# Patient Record
Sex: FEMALE | Race: WHITE | NOT HISPANIC OR LATINO | Employment: FULL TIME | URBAN - METROPOLITAN AREA
[De-identification: names, ages, dates, MRNs, and addresses within clinical notes are randomized per-mention and may not be internally consistent; named-entity substitution may affect disease eponyms.]

---

## 2017-01-19 ENCOUNTER — RECORDS - HEALTHEAST (OUTPATIENT)
Dept: BONE DENSITY | Facility: CLINIC | Age: 57
End: 2017-01-19

## 2017-01-19 ENCOUNTER — TRANSFERRED RECORDS (OUTPATIENT)
Dept: HEALTH INFORMATION MANAGEMENT | Facility: CLINIC | Age: 57
End: 2017-01-19

## 2017-01-19 ENCOUNTER — HOSPITAL ENCOUNTER (OUTPATIENT)
Dept: MAMMOGRAPHY | Facility: CLINIC | Age: 57
Discharge: HOME | End: 2017-01-19
Attending: FAMILY MEDICINE

## 2017-01-19 ENCOUNTER — RECORDS - HEALTHEAST (OUTPATIENT)
Dept: ADMINISTRATIVE | Facility: OTHER | Age: 57
End: 2017-01-19

## 2017-01-19 DIAGNOSIS — Z12.31 VISIT FOR SCREENING MAMMOGRAM: ICD-10-CM

## 2017-01-19 DIAGNOSIS — Z13.820 ENCOUNTER FOR SCREENING FOR OSTEOPOROSIS: ICD-10-CM

## 2017-01-27 ENCOUNTER — AMBULATORY - HEALTHEAST (OUTPATIENT)
Dept: FAMILY MEDICINE | Facility: CLINIC | Age: 57
End: 2017-01-27

## 2017-01-27 ENCOUNTER — COMMUNICATION - HEALTHEAST (OUTPATIENT)
Dept: FAMILY MEDICINE | Facility: CLINIC | Age: 57
End: 2017-01-27

## 2017-01-27 DIAGNOSIS — M85.80 OSTEOPENIA: ICD-10-CM

## 2017-06-26 ENCOUNTER — COMMUNICATION - HEALTHEAST (OUTPATIENT)
Dept: FAMILY MEDICINE | Facility: CLINIC | Age: 57
End: 2017-06-26

## 2017-06-26 ENCOUNTER — AMBULATORY - HEALTHEAST (OUTPATIENT)
Dept: FAMILY MEDICINE | Facility: CLINIC | Age: 57
End: 2017-06-26

## 2017-06-27 ENCOUNTER — AMBULATORY - HEALTHEAST (OUTPATIENT)
Dept: NURSING | Facility: CLINIC | Age: 57
End: 2017-06-27

## 2018-09-28 ENCOUNTER — COMMUNICATION - HEALTHEAST (OUTPATIENT)
Dept: SCHEDULING | Facility: CLINIC | Age: 58
End: 2018-09-28

## 2018-10-22 ENCOUNTER — OFFICE VISIT - HEALTHEAST (OUTPATIENT)
Dept: FAMILY MEDICINE | Facility: CLINIC | Age: 58
End: 2018-10-22

## 2018-10-22 DIAGNOSIS — D75.839 THROMBOCYTOSIS: ICD-10-CM

## 2018-10-22 DIAGNOSIS — E55.9 VITAMIN D DEFICIENCY: ICD-10-CM

## 2018-10-22 DIAGNOSIS — Z12.31 VISIT FOR SCREENING MAMMOGRAM: ICD-10-CM

## 2018-10-22 DIAGNOSIS — Z23 FLU VACCINE NEED: ICD-10-CM

## 2018-10-22 DIAGNOSIS — Z00.00 ROUTINE GENERAL MEDICAL EXAMINATION AT A HEALTH CARE FACILITY: ICD-10-CM

## 2018-10-22 LAB
ANION GAP SERPL CALCULATED.3IONS-SCNC: 11 MMOL/L (ref 5–18)
BUN SERPL-MCNC: 11 MG/DL (ref 8–22)
CALCIUM SERPL-MCNC: 10.4 MG/DL (ref 8.5–10.5)
CHLORIDE BLD-SCNC: 107 MMOL/L (ref 98–107)
CHOLEST SERPL-MCNC: 209 MG/DL
CO2 SERPL-SCNC: 26 MMOL/L (ref 22–31)
CREAT SERPL-MCNC: 0.71 MG/DL (ref 0.6–1.1)
ERYTHROCYTE [DISTWIDTH] IN BLOOD BY AUTOMATED COUNT: 12.6 % (ref 11–14.5)
FASTING STATUS PATIENT QL REPORTED: YES
GFR SERPL CREATININE-BSD FRML MDRD: >60 ML/MIN/1.73M2
GLUCOSE BLD-MCNC: 85 MG/DL (ref 70–125)
HCT VFR BLD AUTO: 42.5 % (ref 35–47)
HDLC SERPL-MCNC: 74 MG/DL
HGB BLD-MCNC: 14.7 G/DL (ref 12–16)
LDLC SERPL CALC-MCNC: 120 MG/DL
MCH RBC QN AUTO: 30.1 PG (ref 27–34)
MCHC RBC AUTO-ENTMCNC: 34.6 G/DL (ref 32–36)
MCV RBC AUTO: 87 FL (ref 80–100)
PLATELET # BLD AUTO: 462 THOU/UL (ref 140–440)
PMV BLD AUTO: 10 FL (ref 7–10)
POTASSIUM BLD-SCNC: 4.5 MMOL/L (ref 3.5–5)
RBC # BLD AUTO: 4.89 MILL/UL (ref 3.8–5.4)
SODIUM SERPL-SCNC: 144 MMOL/L (ref 136–145)
TRIGL SERPL-MCNC: 76 MG/DL
TSH SERPL DL<=0.005 MIU/L-ACNC: 2.66 UIU/ML (ref 0.3–5)
WBC: 9.5 THOU/UL (ref 4–11)

## 2018-10-22 ASSESSMENT — MIFFLIN-ST. JEOR: SCORE: 1111.79

## 2018-10-23 ENCOUNTER — TRANSFERRED RECORDS (OUTPATIENT)
Dept: HEALTH INFORMATION MANAGEMENT | Facility: CLINIC | Age: 58
End: 2018-10-23

## 2018-10-23 ENCOUNTER — HOSPITAL ENCOUNTER (OUTPATIENT)
Dept: MAMMOGRAPHY | Facility: CLINIC | Age: 58
Discharge: HOME | End: 2018-10-23
Attending: FAMILY MEDICINE

## 2018-10-23 DIAGNOSIS — Z12.31 VISIT FOR SCREENING MAMMOGRAM: ICD-10-CM

## 2018-10-23 LAB
25(OH)D3 SERPL-MCNC: 32.6 NG/ML (ref 30–80)
HPV SOURCE: NORMAL
HUMAN PAPILLOMA VIRUS 16 DNA: NEGATIVE
HUMAN PAPILLOMA VIRUS 18 DNA: NEGATIVE
HUMAN PAPILLOMA VIRUS FINAL DIAGNOSIS: NORMAL
HUMAN PAPILLOMA VIRUS OTHER HR: NEGATIVE
SPECIMEN DESCRIPTION: NORMAL

## 2018-10-26 LAB

## 2018-10-27 ENCOUNTER — COMMUNICATION - HEALTHEAST (OUTPATIENT)
Dept: FAMILY MEDICINE | Facility: CLINIC | Age: 58
End: 2018-10-27

## 2018-12-11 ENCOUNTER — OFFICE VISIT - HEALTHEAST (OUTPATIENT)
Dept: FAMILY MEDICINE | Facility: CLINIC | Age: 58
End: 2018-12-11

## 2018-12-11 ENCOUNTER — COMMUNICATION - HEALTHEAST (OUTPATIENT)
Dept: SCHEDULING | Facility: CLINIC | Age: 58
End: 2018-12-11

## 2018-12-11 DIAGNOSIS — R06.2 WHEEZING: ICD-10-CM

## 2018-12-11 ASSESSMENT — MIFFLIN-ST. JEOR: SCORE: 1099.32

## 2018-12-20 ENCOUNTER — OFFICE VISIT - HEALTHEAST (OUTPATIENT)
Dept: FAMILY MEDICINE | Facility: CLINIC | Age: 58
End: 2018-12-20

## 2018-12-20 DIAGNOSIS — J45.40 MODERATE PERSISTENT ASTHMA WITHOUT COMPLICATION: ICD-10-CM

## 2018-12-20 ASSESSMENT — MIFFLIN-ST. JEOR: SCORE: 1108.39

## 2019-01-18 ENCOUNTER — COMMUNICATION - HEALTHEAST (OUTPATIENT)
Dept: FAMILY MEDICINE | Facility: CLINIC | Age: 59
End: 2019-01-18

## 2019-01-18 DIAGNOSIS — Z23 NEED FOR SHINGLES VACCINE: ICD-10-CM

## 2019-01-22 ENCOUNTER — AMBULATORY - HEALTHEAST (OUTPATIENT)
Dept: NURSING | Facility: CLINIC | Age: 59
End: 2019-01-22

## 2019-01-22 DIAGNOSIS — Z23 NEED FOR SHINGLES VACCINE: ICD-10-CM

## 2019-04-02 ENCOUNTER — COMMUNICATION - HEALTHEAST (OUTPATIENT)
Dept: FAMILY MEDICINE | Facility: CLINIC | Age: 59
End: 2019-04-02

## 2019-04-02 DIAGNOSIS — R06.2 WHEEZING: ICD-10-CM

## 2019-12-06 ENCOUNTER — COMMUNICATION - HEALTHEAST (OUTPATIENT)
Dept: FAMILY MEDICINE | Facility: CLINIC | Age: 59
End: 2019-12-06

## 2019-12-06 DIAGNOSIS — R06.2 WHEEZING: ICD-10-CM

## 2020-02-06 ENCOUNTER — COMMUNICATION - HEALTHEAST (OUTPATIENT)
Dept: FAMILY MEDICINE | Facility: CLINIC | Age: 60
End: 2020-02-06

## 2020-03-30 ENCOUNTER — COMMUNICATION - HEALTHEAST (OUTPATIENT)
Dept: FAMILY MEDICINE | Facility: CLINIC | Age: 60
End: 2020-03-30

## 2020-03-30 DIAGNOSIS — R06.2 WHEEZING: ICD-10-CM

## 2020-12-15 ENCOUNTER — OFFICE VISIT (OUTPATIENT)
Dept: OBGYN | Facility: CLINIC | Age: 60
End: 2020-12-15
Payer: COMMERCIAL

## 2020-12-15 ENCOUNTER — HOSPITAL ENCOUNTER (OUTPATIENT)
Dept: MAMMOGRAPHY | Facility: CLINIC | Age: 60
Discharge: HOME | End: 2020-12-15
Attending: OBSTETRICS & GYNECOLOGY | Admitting: OBSTETRICS & GYNECOLOGY
Payer: COMMERCIAL

## 2020-12-15 VITALS
HEIGHT: 64 IN | SYSTOLIC BLOOD PRESSURE: 126 MMHG | DIASTOLIC BLOOD PRESSURE: 58 MMHG | BODY MASS INDEX: 21.34 KG/M2 | WEIGHT: 125 LBS

## 2020-12-15 DIAGNOSIS — Z01.419 ENCOUNTER FOR GYNECOLOGICAL EXAMINATION WITHOUT ABNORMAL FINDING: Primary | ICD-10-CM

## 2020-12-15 DIAGNOSIS — Z12.11 COLON CANCER SCREENING: ICD-10-CM

## 2020-12-15 DIAGNOSIS — Z12.31 VISIT FOR SCREENING MAMMOGRAM: ICD-10-CM

## 2020-12-15 DIAGNOSIS — E55.9 VITAMIN D DEFICIENCY: ICD-10-CM

## 2020-12-15 DIAGNOSIS — Z12.4 SCREENING FOR CERVICAL CANCER: ICD-10-CM

## 2020-12-15 PROCEDURE — 77067 SCR MAMMO BI INCL CAD: CPT

## 2020-12-15 PROCEDURE — G0145 SCR C/V CYTO,THINLAYER,RESCR: HCPCS | Performed by: OBSTETRICS & GYNECOLOGY

## 2020-12-15 PROCEDURE — 99386 PREV VISIT NEW AGE 40-64: CPT | Performed by: OBSTETRICS & GYNECOLOGY

## 2020-12-15 PROCEDURE — 36415 COLL VENOUS BLD VENIPUNCTURE: CPT | Performed by: OBSTETRICS & GYNECOLOGY

## 2020-12-15 PROCEDURE — 82306 VITAMIN D 25 HYDROXY: CPT | Performed by: OBSTETRICS & GYNECOLOGY

## 2020-12-15 PROCEDURE — 87624 HPV HI-RISK TYP POOLED RSLT: CPT | Performed by: OBSTETRICS & GYNECOLOGY

## 2020-12-15 RX ORDER — PHENOL 1.4 %
AEROSOL, SPRAY (ML) MUCOUS MEMBRANE DAILY
COMMUNITY

## 2020-12-15 RX ORDER — MULTIVIT WITH MINERALS/LUTEIN
250 TABLET ORAL DAILY
COMMUNITY
End: 2024-03-03

## 2020-12-15 RX ORDER — VITAMIN B COMPLEX
TABLET ORAL DAILY
COMMUNITY
End: 2021-11-15

## 2020-12-15 RX ORDER — FLUTICASONE PROPIONATE 110 UG/1
1 AEROSOL, METERED RESPIRATORY (INHALATION) 2 TIMES DAILY PRN
COMMUNITY

## 2020-12-15 RX ORDER — ALBUTEROL SULFATE 0.83 MG/ML
2.5 SOLUTION RESPIRATORY (INHALATION) EVERY 6 HOURS PRN
COMMUNITY
End: 2021-04-02

## 2020-12-15 ASSESSMENT — MIFFLIN-ST. JEOR: SCORE: 1122

## 2020-12-15 NOTE — NURSING NOTE
"Chief Complaint   Patient presents with     Physical       Initial /58 (BP Location: Left arm, Patient Position: Chair, Cuff Size: Adult Large)   Ht 1.626 m (5' 4\")   Wt 56.7 kg (125 lb)   LMP 2008   Breastfeeding No   BMI 21.46 kg/m   Estimated body mass index is 21.46 kg/m  as calculated from the following:    Height as of this encounter: 1.626 m (5' 4\").    Weight as of this encounter: 56.7 kg (125 lb).  BP completed using cuff size: regular    Questioned patient about current smoking habits.  Pt. has never smoked.          The following HM Due: pap smear  Rocio Hood CMA    "

## 2020-12-15 NOTE — PATIENT INSTRUCTIONS
"You can reach your [facility] Care Team any time of the day by calling [phone]. This number will put you in touch with the 24 hour nurse line if the clinic is closed.    To contact your OB/GYN Station Coordinator/Surgery Scheduler please call [phone]. This is a direct number for your care team between 8 a.m. and 4 p.m. Monday through Friday.    [facility] is open for your convenience:  Monday through Friday 8 a.m. to 6 p.m.  Closed weekends and all major holidays.  What lifestyle changes can I make to help improve my cholesterol levels?    Exercise regularly.  Exercise can raise HDL cholesterol levels and reduce levels of LDL cholesterol and triglycerides. If you haven't been exercising, try to work up to 30 minutes, 4 to 6 times a week.    Lose weight if you are overweight.  Being overweight can raise your cholesterol levels. Losing weight, even just 5 or 10 pounds, can lower your total cholesterol, LDL cholesterol and triglyceride levels.    Eat a heart-healthy diet.  Eat plenty of fresh fruits and vegetables. Fruits and vegetables are naturally low in fat. Not only do they add flavor and variety to your diet, but they are also the best source of fiber, vitamins and minerals for your body. Aim for 5 cups of fruits and vegetables every day, not counting potatoes, corn and rice. Potatoes, corn and rice count as carbohydrates.     Pick \"good\" fats over \"bad\" fats. Fat is part of a healthy diet, but you need to know the difference between \"bad\" fats and \"good\" fats. \"Bad\" fats, such as saturated and trans fats, are found in foods such as butter; coconut and palm oil; saturated or partially hydrogenated vegetable fats such as shortening and margarine; animal fats in meats; and fats in whole milk dairy products. Limit the amount of saturated fat in your diet, and avoid trans fat completely. Unsaturated fat is the \"good\" fat. Most fats in fish, vegetables, grains and tree nuts are unsaturated. Try to eat " "unsaturated fat in place of saturated fat. For example, you can use olive oil or canola oil in cooking instead of butter.     Use healthier cooking methods. Baking, broiling and roasting are the healthiest ways to prepare meat, poultry and other foods. Trim any outside fat or skin before cooking. Lean cuts can be pan-broiled or stir-fried. Use either a nonstick pan or nonstick cooking spray instead of adding fats such as butter or margarine. When eating out, ask how food is prepared. You can request that your food be baked, broiled or roasted, rather than fried.   Look for other sources of protein. Fish, dry beans, tree nuts, peas and lentils offer protein, nutrients and fiber without the cholesterol and saturated fats that meats have. Consider eating one \"meatless\" meal each week. Try substituting beans for meat in a favorite recipe, such as lasagna or chili. Snack on a handful of almonds or pecans. Soy is also an excellent source of protein. Good examples of soy include soy milk, edamame (green soy beans), tofu and soy protein shakes.     Get more fiber in your diet. Add good sources of fiber to your meals. Examples include fruits, vegetables, whole grains (such as oat bran, whole and rolled oats and barley), legumes (such as beans and peas) and nuts and seeds (such as ground flax seed). In addition to fiber, whole grains supply B-vitamins and important nutrients not found in foods made with white flour.     Eat more fish. Fish are an excellent source of omega-3 fatty acids. Wild-caught oily fish, such as salmon, tuna, mackerel and sardines, are the best sources of omega-3s, but all fish contain some amount of this beneficial fatty acid. Aim for 2 6-oz servings each week.     "

## 2020-12-15 NOTE — PROGRESS NOTES
HPI:  Crys Quarles is a 60 year old white female  ,vasectomy and menopause for contraception who presents for an annual exam and pap.  She is otherwise doing well without concerns.  The patient had a fasting lipid panel in 2018 with a total cholesterol of 209 and LDL of 120.  The patient has no other significant risk factors.  We did discuss diet and exercise with a recheck on this.  I reviewed also her vitamin D level from 2018.  The patient presently is on supplements.  She has been following with Dr. Bettina Salgado a family practitioner in [place].  Dr. Salgado has since retired.  We discussed colon cancer screening and referral was given  Self breast exam,  ACS screening mammogram recs, the use of 81 mg ASA to decrease the risk of heart disease, lipid screening, colon cancer screening recs and Dexa scan recs thoroughly reveiwed.      Past Medical History:   Diagnosis Date     Asthma      NO ACTIVE PROBLEMS      Past Surgical History:   Procedure Laterality Date     BREAST BIOPSY, CORE RT/LT      benign     LASER TX, CERVICAL  89    Laser TX Cervical JENNIFER 3 neg margins, no invasion     [facility]       [facility]       Family History   Problem Relation Age of Onset     Diabetes Sister      Heart Disease Sister 68        heart attack     Thyroid Disease Sister      Heart Disease Sister          due to heart issues     Heart Disease Sister      Cancer Mother         mets     Breast Cancer Mother      Cancer Father         mets     Cancer - colorectal Maternal Aunt      Social History     Socioeconomic History     Marital status:      Spouse name: Not on file     Number of children: Not on file     Years of education: Not on file     Highest education level: Not on file   Occupational History     Occupation:      Employer: [employer]   Social Needs     Financial resource strain: Not on file     Food insecurity     Worry: Not on file      Inability: Not on file     Transportation needs     Medical: Not on file     Non-medical: Not on file   Tobacco Use     Smoking status: Never Smoker     Smokeless tobacco: Never Used   Substance and Sexual Activity     Alcohol use: No     Drug use: No     Sexual activity: Not Currently     Partners: Male     Birth control/protection: Post-menopausal     Comment: vasectomy   Lifestyle     Physical activity     Days per week: Not on file     Minutes per session: Not on file     Stress: Not on file   Relationships     Social connections     Talks on phone: Not on file     Gets together: Not on file     Attends Anabaptism service: Not on file     Active member of club or organization: Not on file     Attends meetings of clubs or organizations: Not on file     Relationship status: Not on file     Intimate partner violence     Fear of current or ex partner: Not on file     Emotionally abused: Not on file     Physically abused: Not on file     Forced sexual activity: Not on file   Other Topics Concern     Parent/sibling w/ CABG, MI or angioplasty before 65F 55M? Not Asked   Social History Narrative     Not on file       Allergies:  Patient has no known allergies.    Current Outpatient Medications   Medication Sig Dispense Refill     calcium carbonate (OS-ROBERT) 600 MG tablet Take by mouth 2 times daily (with meals)       Multiple Vitamins-Minerals (MULTIVITAMIN ADULTS PO)        vitamin C (ASCORBIC ACID) 250 MG tablet Take 250 mg by mouth daily       Vitamin D3 (CHOLECALCIFEROL) 25 mcg (1000 units) tablet Take by mouth daily       albuterol (PROVENTIL) (2.5 MG/3ML) 0.083% neb solution Take 2.5 mg by nebulization every 6 hours as needed for shortness of breath / dyspnea or wheezing       fluticasone (FLOVENT HFA) 110 MCG/ACT inhaler Inhale 1 puff into the lungs 2 times daily       NO ACTIVE MEDICATIONS .         ROS: ROS: 10 point ROS neg other than the symptoms noted above in the HPI.    EXAM:  Vitals: /58 (BP  "Location: Left arm, Patient Position: Chair, Cuff Size: Adult Large)   Ht 1.626 m (5' 4\")   Wt 56.7 kg (125 lb)   LMP 03/06/2008   Breastfeeding No   BMI 21.46 kg/m    BMI= Body mass index is 21.46 kg/m .  Constitutional: healthy, alert and no distress  Head: Normocephalic. No masses, lesions, tenderness or abnormalities  Neck: Neck supple. No adenopathy. Thyroid symmetric, normal size,, Carotids without bruits.  ENT: NEGATIVE for ear, mouth and throat problems  Breast:  breasts symmetric, no dominant or suspicious mass, no skin or nipple changes, no axillary adenopathy, unchanged from previous exam or self exam in taught and encouraged  Cardiovascular: negative, PMI normal. No lifts, heaves, or thrills. RRR. No murmurs, clicks gallops or rub  Respiratory: negative, Percussion normal. Good diaphragmatic excursion. Lungs clear  Gastrointestinal: Abdomen soft, non-tender. BS normal. No masses, organomegaly  Genitourinary: Pelvic Exam:  External Genitalia:     Normal appearance for age, no discharge present, no tenderness present, no inflammatory lesions present, color normal  Vagina:     Normal vaginal vault without central or paravaginal defects, no discharge present, no inflammatory lesions present, no masses present  Bladder:     Nontender to palpation  Urethra:   Urethral Body:  Urethra palpation normal, urethra structural support normal   Urethral Meatus:  No erythema or lesions present  Cervix:     Appearance healthy, no lesions present, nontender to palpation, no bleeding present  Uterus:     Uterus: firm, normal sized and nontender, midplane in position.   Adnexa:     No adnexal tenderness present, no adnexal masses present  Perineum:     Perineum within normal limits, no evidence of trauma, no rashes or skin lesions present  Anus:     Anus within normal limits, no hemorrhoids present  Inguinal Lymph Nodes:     No lymphadenopathy present  Pubic Hair:     Normal pubic hair distribution for age  Genitalia " and Groin:     No rashes present, no lesions present, no areas of discoloration, no masses present    Musculoskeletalextremities normal- no gross deformities noted, gait normal and normal muscle tone  Integument: no suspicious lesions or rashes  Neurologic: Gait normal. Reflexes normal and symmetric. Sensation grossly WNL.  Psychiatric: mentation appears normal and affect normal/bright  Hematologic/Lymphatic/Immunologic: Normal cervical lymph nodes     ASSESSMENT:/PLAN:  (Z01.419) Encounter for gynecological examination without abnormal finding  (primary encounter diagnosis)  Comment: DEXA screening results and recommendations reviewed  Plan: DX Hip/Pelvis/Spine        Follow-up scan ordered    (Z12.4) Screening for cervical cancer  Comment: Past history and recommendations reviewed  Plan: Pap imaged thin layer screen with HPV -         recommended age 30 - 65, HPV High Risk Types         DNA Cervical        Done    (E55.9) Vitamin D deficiency  Comment: Patient is on supplementation for low vitamin D level  Plan: Vitamin D Deficiency        We will recheck a vitamin D level with appropriate therapy to follow    (Z12.11) Colon cancer screening  Comment: Recommendations reviewed  Plan: GASTROENTEROLOGY ADULT REF PROCEDURE ONLY        Ordered      Bebo Mariscal M.D.

## 2020-12-16 LAB — DEPRECATED CALCIDIOL+CALCIFEROL SERPL-MC: 49 UG/L (ref 20–75)

## 2020-12-17 NOTE — RESULT ENCOUNTER NOTE
Please notify pt of nl result.  Patient can stop her additional vitamin D supplementation and switch to a daily multivitamin such as a 1 a day or Centrum instead.  The multivitamin and adequate servings of dairy products daily should provide her with an adequate vitamin D amount  Bebo Mariscal M.D.

## 2020-12-21 LAB
COPATH REPORT: NORMAL
PAP: NORMAL

## 2020-12-22 LAB
FINAL DIAGNOSIS: NORMAL
HPV HR 12 DNA CVX QL NAA+PROBE: NEGATIVE
HPV16 DNA SPEC QL NAA+PROBE: NEGATIVE
HPV18 DNA SPEC QL NAA+PROBE: NEGATIVE
SPECIMEN DESCRIPTION: NORMAL
SPECIMEN SOURCE CVX/VAG CYTO: NORMAL

## 2021-01-28 ENCOUNTER — ANCILLARY PROCEDURE (OUTPATIENT)
Dept: BONE DENSITY | Facility: CLINIC | Age: 61
End: 2021-01-28
Attending: OBSTETRICS & GYNECOLOGY
Payer: COMMERCIAL

## 2021-01-28 DIAGNOSIS — Z01.419 ENCOUNTER FOR GYNECOLOGICAL EXAMINATION WITHOUT ABNORMAL FINDING: ICD-10-CM

## 2021-01-28 PROCEDURE — 77080 DXA BONE DENSITY AXIAL: CPT | Performed by: INTERNAL MEDICINE

## 2021-02-01 NOTE — RESULT ENCOUNTER NOTE
Can you please reviewed the bone density result with the patient showing bilateral hip osteoporosis.  I would like her to see Dr. Bangura or one of the endocrinologists to consider additional therapy for this  Thank you  Bebo Mariscal MD FACOG

## 2021-02-04 DIAGNOSIS — Z01.419 ENCOUNTER FOR GYNECOLOGICAL EXAMINATION WITHOUT ABNORMAL FINDING: Primary | ICD-10-CM

## 2021-02-05 NOTE — PROGRESS NOTES
Per result note on the dexa scan this pt was contacted and advised to follow up with endocrinology.  The referral is placed and she has contact information for them.    Alda Whyte RN

## 2021-02-05 NOTE — PROGRESS NOTES
2/5/2021  I left the patient a voicemail message today to return my call.  By her recent DEXA scan she has a diagnosis of osteoporosis.  I did want her to see endocrinology to determine the most appropriate therapy plan for this.  Could you please contact the patient and help her make a trial with endocrinology to evaluate this concern  Thank you  Bebo Mariscal MD FACOG

## 2021-02-11 ENCOUNTER — TELEPHONE (OUTPATIENT)
Dept: OBGYN | Facility: CLINIC | Age: 61
End: 2021-02-11

## 2021-02-18 ENCOUNTER — VIRTUAL VISIT (OUTPATIENT)
Dept: OBGYN | Facility: CLINIC | Age: 61
End: 2021-02-18
Payer: COMMERCIAL

## 2021-02-18 DIAGNOSIS — M81.0 OSTEOPOROSIS, UNSPECIFIED OSTEOPOROSIS TYPE, UNSPECIFIED PATHOLOGICAL FRACTURE PRESENCE: Primary | ICD-10-CM

## 2021-02-18 PROCEDURE — 99214 OFFICE O/P EST MOD 30 MIN: CPT | Mod: 95 | Performed by: OBSTETRICS & GYNECOLOGY

## 2021-02-18 NOTE — PROGRESS NOTES
Crys is a 60 year old who is being evaluated via a billable telephone visit.      What phone number would you like to be contacted at? [phone]  How would you like to obtain your AVS?     Assessment & Plan     Osteoporosis, unspecified osteoporosis type, unspecified pathological fracture presence  Patient to see endocrinology.  At this point she would prefer to see the endocrinology clinic of [place] .  She will make an appointment and asked them to forward a copy of their findings      Review of prior external note(s) from - Recent DEXA scan and physical exam  30 minutes spent on the date of the encounter doing chart review, history and phone review, documentation and further activities as noted above  Bebo Mariscal M.D.       CONSULTATION/REFERRAL to endocrinology as noted above    No follow-ups on file.    Bebo Mariscal MD  [facility]   Crys is a 60 year old white female  postmenopausal not on hormone replacement therapy who presents for the following health issues recent DEXA scan showing osteoporosis.  The patient states that she had been on prednisone for 2 years and previously had been noted to have osteopenia on an earlier DEXA scan.  I reviewed the results of the DEXA scan today showing a T score of -2.5 multiples of the median.  I reviewed her FRAX index score as well.  Risks, benefits, and alternative modes of therapy discussed at length. Pathophysiology of the disease process reviewed, all of the patients questions answered and informed consent obtained.  We discussed different treatment alternatives for her osteoporosis.  We discussed in light of the fact that she is not been treated in the past having her seen and evaluated by endocrinology.  We discussed that medications such as Forteo for a period of 2 years followed by an agent such as Prolia or Reclast may be helpful.  Ice discussed with her that this would further be addressed  when she sees endocrinology.  I gave her the names of several endocrinologists and she will call to make an appointment with them:                                   Phone call duration: 30  minutes

## 2021-02-18 NOTE — PATIENT INSTRUCTIONS
You can reach your [facility] Care Team any time of the day by calling [phone]. This number will put you in touch with the 24 hour nurse line if the clinic is closed.    To contact your OB/GYN Station Coordinator/Surgery Scheduler please call [phone]. This is a direct number for your care team between 8 a.m. and 4 p.m. Monday through Friday.    [facility] is open for your convenience:  Monday through Friday 8 a.m. to 6 p.m.  Closed weekends and all major holidays.

## 2021-02-18 NOTE — NURSING NOTE
"Chief Complaint   Patient presents with     Consult     DEXA       Initial LMP 2008  Estimated body mass index is 21.46 kg/m  as calculated from the following:    Height as of 12/15/20: 1.626 m (5' 4\").    Weight as of 12/15/20: 56.7 kg (125 lb).  BP completed using cuff size: NA (Not Taken)    Questioned patient about current smoking habits.  Pt. has never smoked.          The following HM Due: NONE      The following patient reported/Care Every where data was sent to:  P ABSTRACT QUALITY INITIATIVES [[id number]]        Maria Luz Williamson CMA                 "

## 2021-02-26 ENCOUNTER — TELEPHONE (OUTPATIENT)
Dept: OBGYN | Facility: CLINIC | Age: 61
End: 2021-02-26

## 2021-02-26 NOTE — TELEPHONE ENCOUNTER
Pt calling to let md know that she called to schedule the colonoscopy that was ordered, and was advised that she is not due to have one until 8/2022.     Yesenia KONG RN

## 2021-02-26 NOTE — TELEPHONE ENCOUNTER
Bebo Mariscal MD  You 2 minutes ago (9:01 AM)     Yesenia-thank you for the update.  Epic had said that she was due at this time.  Thank you for the follow-up   Bebo Mariscal MD FACOG    Message text

## 2021-04-02 ENCOUNTER — VIRTUAL VISIT (OUTPATIENT)
Dept: ENDOCRINOLOGY | Facility: CLINIC | Age: 61
End: 2021-04-02
Payer: COMMERCIAL

## 2021-04-02 DIAGNOSIS — M81.0 AGE-RELATED OSTEOPOROSIS WITHOUT CURRENT PATHOLOGICAL FRACTURE: Primary | ICD-10-CM

## 2021-04-02 PROCEDURE — 99204 OFFICE O/P NEW MOD 45 MIN: CPT | Mod: 95 | Performed by: INTERNAL MEDICINE

## 2021-04-02 RX ORDER — ALENDRONATE SODIUM 70 MG/1
70 TABLET ORAL
Qty: 12 TABLET | Refills: 3 | Status: SHIPPED | OUTPATIENT
Start: 2021-04-02 | End: 2021-11-15 | Stop reason: ALTCHOICE

## 2021-04-02 NOTE — Clinical Note
Thanks for consult.  This is a straight-forward case and I recommend simple treatment with an oral bisphosphonate medication. She also needs follow-up for her ITP, asthma, hyperlipidemia... with you or another PCP.  TL

## 2021-04-02 NOTE — LETTER
4/2/2021         RE: Crys Quarles  [address] [facility] [id number]        Dear Colleague,    Thank you for referring your patient, Crys Quarles, to the [facility]. Please see a copy of my visit note below.    Crys is a 60 year old who is being evaluated via a billable video visit.      How would you like to obtain your AVS? Verbally Reviewed  If the video visit is dropped, the invitation should be resent by: Cellphone  Will anyone else be joining your video visit? No      Video Start Time: 2:00 pm      Name: Crys Quarles is a 60 year old woman, seen at the request of Dr. Bebo Mariscal for evaluation of     Chief Complaint   Patient presents with     Osteoporosis       HPI:  Recent issues:  Here for evaluation of osteoporosis  Reviewed medical history from patient and Epic chart record        1982. History of ITP, then Prednisone medication for ~2 yrs duration  Menopause early 50's, no subsequent HRT  Previous medical evaluations with Dr. Bettina Salgado/[facility]    1/23/17 DEXA:   L1-4    T-score:  -0.4    Left fem neck   T-score: -2.0   Right fem neck  T-score: 12.2    12/2020. GYN evaluation with Dr. Bebo Mariscal/[facility]   1/28/21 DEXA:               Lumbar Spine (L1-L4)       T-score:  -0.4, degenerative changes present               Left Femoral Neck             T-score:  -2.5               Right Femoral Neck           T-score:  -2.5    Health history includes:  Bone fractures:   none  Vit D deficiency:   yes   Kidney stones:   None  Osteoporosis med:   None previously  Supplements:    MVI 1-tab daily       Calcium 600 mg 1-tab daily       Vit C 500 mg 1-tab daily  Previous [facility] labs include:     Lab Test 12/15/20  1534   VITDT 49     Recent [facility] labs include:  Lab Results   Component Value Date    GLC 84 12/09/2013    VITDT 49 12/15/2020     Other chronic illnesses include:   ITP:    Followed by  PCP   Asthma:  Followed by PCP   Hyperlipidemia: Followed by PCP      Lives in [place], [place]  Sees Dr. Bebo Mariscal for medicine evaluations.    PMH/PSH:  Past Medical History:   Diagnosis Date     Age-related osteoporosis without current pathological fracture      Asthma      Hyperlipidemia LDL goal <130      Idiopathic thrombocytopenic purpura (H)      Past Surgical History:   Procedure Laterality Date     BREAST BIOPSY, CORE RT/LT      benign     LASER TX, CERVICAL  91    Laser TX Cervical JENNIFER 3 neg margins, no invasion     [facility]         Family Hx:  Family History   Problem Relation Age of Onset     Diabetes Sister      Heart Disease Sister 68        heart attack     Thyroid Disease Sister      Heart Disease Sister          due to heart issues     Heart Disease Sister      Cancer Mother         mets     Breast Cancer Mother      Cancer Father         mets     Cancer - colorectal Maternal Aunt          Social Hx:  Social History     Socioeconomic History     Marital status:      Spouse name: Not on file     Number of children: Not on file     Years of education: Not on file     Highest education level: Not on file   Occupational History     Occupation:      Employer: [employer]   Social Needs     Financial resource strain: Not on file     Food insecurity     Worry: Not on file     Inability: Not on file     Transportation needs     Medical: Not on file     Non-medical: Not on file   Tobacco Use     Smoking status: Never Smoker     Smokeless tobacco: Never Used   Substance and Sexual Activity     Alcohol use: No     Drug use: No     Sexual activity: Not Currently     Partners: Male     Birth control/protection: Post-menopausal     Comment: vasectomy   Lifestyle     Physical activity     Days per week: Not on file     Minutes per session: Not on file     Stress: Not on file   Relationships     Social connections     Talks on phone: Not  on file     Gets together: Not on file     Attends Sikhism service: Not on file     Active member of club or organization: Not on file     Attends meetings of clubs or organizations: Not on file     Relationship status: Not on file     Intimate partner violence     Fear of current or ex partner: Not on file     Emotionally abused: Not on file     Physically abused: Not on file     Forced sexual activity: Not on file   Other Topics Concern     Parent/sibling w/ CABG, MI or angioplasty before 65F 55M? Not Asked   Social History Narrative     Not on file          MEDICATIONS:  has a current medication list which includes the following prescription(s): alendronate, calcium carbonate, fluticasone, multiple vitamins-minerals, vitamin c, NO ACTIVE MEDICATIONS, and vitamin d3.    ROS:     ROS: 10 point ROS neg other than the symptoms noted above in the HPI.    GENERAL: mild fatigue, wt stable; denies fevers, chills, night sweats.    HEENT: no dysphagia, odonophagia, diplopia, neck pain  THYROID:  no apparent hyper or hypothyroid symptoms  CV: no chest pain, pressure, palpitations  LUNGS: no SOB, HOLLIDAY, cough, wheezing   ABDOMEN: no diarrhea, constipation, abdominal pain  EXTREMITIES: no rashes, ulcers, edema  NEUROLOGY: no headaches, denies changes in vision, tingling, extremitiy numbness   MSK: occasional back pains; denies muscle weakness  SKIN: no rashes or lesions  : no menses since early 50's  PSYCH:  stable mood, no significant anxiety or depression  ENDOCRINE: no heat or cold intolerance    Physical Exam (visual exam)  VS:  no vital signs taken for video visit  CONSTITUTIONAL: healthy, alert and NAD, well dressed, answering questions appropriately  ENT: no nose swelling or nasal discharge, mouth redness or gum changes.  EYES: eyes grossly normal to inspection, conjunctivae and sclerae normal, no exophthalmos or proptosis  THYROID:  no apparent nodules or goiter  LUNGS: no audible wheeze, cough or visible cyanosis,  no visible retractions or increased work of breathing  ABDOMEN: abdomen not evaluated  EXTREMITIES: no hand tremors, limited exam  NEUROLOGY: CN grossly intact, mentation intact and speech normal   SKIN:  no apparent skin lesions, rash, or edema with visualized skin appearance  PSYCH: mentation appears normal, affect normal/bright, judgement and insight intact,   normal speech and appearance well groomed      LABS:    All pertinent notes, labs, and images personally reviewed by me.     A/P:  Encounter Diagnosis   Name Primary?     Age-related osteoporosis without current pathological fracture Yes       Comments:  Reviewed health history and osteoporosis issues.  Risk factors include low estrogen of menopause, previous (short duration) steroid use for ITP, vit D deficiency  Reviewed and interpreted tests that I previously ordered.   Ordered appropriate tests for the endocrinology disease management.    Management options discussed and implemented after shared medical decision making with the patient.    Plan:  Discussed general issues with the osteoporosis diagnosis and management  Reviewed concept of using the DEXA scan imaging to assess bone mineral density (BMD)  Discussed terminology of the T-score   We discussed lab tests to assess for secondary causes of bone thinning  Reviewed treatment options with oral bisphosphonate, SERM, IV Boniva vs Reclast, SQ Prolia vs Forteo    Recommend:  Reviewed several different medication treatment options.  I favor a simple plan using an oral bisphoshponate medication  Would not use Prolia, Reclast, Forteo, or Tymlos at this time  Consider alendronate, risedronate, or ibandronate... this can be initiated by her GYN or another PCP    Start alendronate 70 mg po Q week, reviewed the medication, expected benefits and also possible SE's, dosing, expected duration of use   New alendronate Rx sent to her pharmacy  Monitor for symptom changes  Plan lab testing for calcium, vit D, TSH,  PTH with next primary care clinic lab testing   Orders placed  Continue calcium and vitamin D supplement use  Avoid heavy lifting and fall injuries  Repeat DEXA scan in 4/2022    She needs repeat fasting labs with her PCP to focus on the ITP, hyperlipidemia history  Since she plans to see Dr. Mariscal for general medicine visits, I encouraged her to make the followup appt with him  Addressed patient questions today    Future labs ordered today:   Orders Placed This Encounter   Procedures     Calcium     Parathyroid Hormone Intact     TSH     Vitamin D Deficiency     Radiology/Consults ordered today: None    Total time spent in with the patient evaluation:  35 min  Additional time spent reviewing pertinent lab tests and chart notes, and documentation:  10 min    Follow-up:  4/2022    BLANCA Jessica MD, MS  Endocrinology  [facility]    CC: JOSÉ Mariscal      Video-Visit Details    Type of service:  Video Visit    Video End Time: 2:35 pm    Originating Location (pt. Location): home    Distant Location (provider location):  [facility]/home     Platform used for Video Visit:  Ch[employer]      Again, thank you for allowing me to participate in the care of your patient.        Sincerely,        Bebo Jessica MD

## 2021-04-02 NOTE — PROGRESS NOTES
Crys is a 60 year old who is being evaluated via a billable video visit.      How would you like to obtain your AVS? Verbally Reviewed  If the video visit is dropped, the invitation should be resent by: Cellphone  Will anyone else be joining your video visit? No      Video Start Time: 2:00 pm      Name: Crys Quarles is a 60 year old woman, seen at the request of Dr. Bebo Mariscal for evaluation of     Chief Complaint   Patient presents with     Osteoporosis       HPI:  Recent issues:  Here for evaluation of osteoporosis  Reviewed medical history from patient and Epic chart record        1982. History of ITP, then Prednisone medication for ~2 yrs duration  Menopause early 50's, no subsequent HRT  Previous medical evaluations with Dr. Bettina Salgado/[facility]    1/23/17 DEXA:   L1-4    T-score:  -0.4    Left fem neck   T-score: -2.0   Right fem neck  T-score: 12.2    12/2020. GYN evaluation with Dr. Bebo Mariscal/[facility]   1/28/21 DEXA:               Lumbar Spine (L1-L4)       T-score:  -0.4, degenerative changes present               Left Femoral Neck             T-score:  -2.5               Right Femoral Neck           T-score:  -2.5    Health history includes:  Bone fractures:   none  Vit D deficiency:   yes   Kidney stones:   None  Osteoporosis med:   None previously  Supplements:    MVI 1-tab daily       Calcium 600 mg 1-tab daily       Vit C 500 mg 1-tab daily  Previous [facility] labs include:     Lab Test 12/15/20  1534   VITDT 49     Recent [facility] labs include:  Lab Results   Component Value Date    GLC 84 12/09/2013    VITDT 49 12/15/2020     Other chronic illnesses include:   ITP:    Followed by PCP   Asthma:  Followed by PCP   Hyperlipidemia: Followed by PCP      Lives in [place], [place]  Sees Dr. Bebo Mariscal for medicine evaluations.    PMH/PSH:  Past Medical History:   Diagnosis Date     Age-related osteoporosis without current pathological fracture      Asthma       Hyperlipidemia LDL goal <130      Idiopathic thrombocytopenic purpura (H)      Past Surgical History:   Procedure Laterality Date     BREAST BIOPSY, CORE RT/LT      benign     LASER TX, CERVICAL  91    Laser TX Cervical JENNIFER 3 neg margins, no invasion     SPLENECTOMY       ILDEFONSO [facility]         Family Hx:  Family History   Problem Relation Age of Onset     Diabetes Sister      Heart Disease Sister 68        heart attack     Thyroid Disease Sister      Heart Disease Sister          due to heart issues     Heart Disease Sister      Cancer Mother         mets     Breast Cancer Mother      Cancer Father         mets     Cancer - colorectal Maternal Aunt          Social Hx:  Social History     Socioeconomic History     Marital status:      Spouse name: Not on file     Number of children: Not on file     Years of education: Not on file     Highest education level: Not on file   Occupational History     Occupation:      Employer: [employer]   Social Needs     Financial resource strain: Not on file     Food insecurity     Worry: Not on file     Inability: Not on file     Transportation needs     Medical: Not on file     Non-medical: Not on file   Tobacco Use     Smoking status: Never Smoker     Smokeless tobacco: Never Used   Substance and Sexual Activity     Alcohol use: No     Drug use: No     Sexual activity: Not Currently     Partners: Male     Birth control/protection: Post-menopausal     Comment: vasectomy   Lifestyle     Physical activity     Days per week: Not on file     Minutes per session: Not on file     Stress: Not on file   Relationships     Social connections     Talks on phone: Not on file     Gets together: Not on file     Attends Jainism service: Not on file     Active member of club or organization: Not on file     Attends meetings of clubs or organizations: Not on file     Relationship status: Not on file     Intimate partner violence     Fear of current  or ex partner: Not on file     Emotionally abused: Not on file     Physically abused: Not on file     Forced sexual activity: Not on file   Other Topics Concern     Parent/sibling w/ CABG, MI or angioplasty before 65F 55M? Not Asked   Social History Narrative     Not on file          MEDICATIONS:  has a current medication list which includes the following prescription(s): alendronate, calcium carbonate, fluticasone, multiple vitamins-minerals, vitamin c, NO ACTIVE MEDICATIONS, and vitamin d3.    ROS:     ROS: 10 point ROS neg other than the symptoms noted above in the HPI.    GENERAL: mild fatigue, wt stable; denies fevers, chills, night sweats.    HEENT: no dysphagia, odonophagia, diplopia, neck pain  THYROID:  no apparent hyper or hypothyroid symptoms  CV: no chest pain, pressure, palpitations  LUNGS: no SOB, HOLLIDAY, cough, wheezing   ABDOMEN: no diarrhea, constipation, abdominal pain  EXTREMITIES: no rashes, ulcers, edema  NEUROLOGY: no headaches, denies changes in vision, tingling, extremitiy numbness   MSK: occasional back pains; denies muscle weakness  SKIN: no rashes or lesions  : no menses since early 50's  PSYCH:  stable mood, no significant anxiety or depression  ENDOCRINE: no heat or cold intolerance    Physical Exam (visual exam)  VS:  no vital signs taken for video visit  CONSTITUTIONAL: healthy, alert and NAD, well dressed, answering questions appropriately  ENT: no nose swelling or nasal discharge, mouth redness or gum changes.  EYES: eyes grossly normal to inspection, conjunctivae and sclerae normal, no exophthalmos or proptosis  THYROID:  no apparent nodules or goiter  LUNGS: no audible wheeze, cough or visible cyanosis, no visible retractions or increased work of breathing  ABDOMEN: abdomen not evaluated  EXTREMITIES: no hand tremors, limited exam  NEUROLOGY: CN grossly intact, mentation intact and speech normal   SKIN:  no apparent skin lesions, rash, or edema with visualized skin  appearance  PSYCH: mentation appears normal, affect normal/bright, judgement and insight intact,   normal speech and appearance well groomed      LABS:    All pertinent notes, labs, and images personally reviewed by me.     A/P:  Encounter Diagnosis   Name Primary?     Age-related osteoporosis without current pathological fracture Yes       Comments:  Reviewed health history and osteoporosis issues.  Risk factors include low estrogen of menopause, previous (short duration) steroid use for ITP, vit D deficiency  Reviewed and interpreted tests that I previously ordered.   Ordered appropriate tests for the endocrinology disease management.    Management options discussed and implemented after shared medical decision making with the patient.    Plan:  Discussed general issues with the osteoporosis diagnosis and management  Reviewed concept of using the DEXA scan imaging to assess bone mineral density (BMD)  Discussed terminology of the T-score   We discussed lab tests to assess for secondary causes of bone thinning  Reviewed treatment options with oral bisphosphonate, SERM, IV Boniva vs Reclast, SQ Prolia vs Forteo    Recommend:  Reviewed several different medication treatment options.  I favor a simple plan using an oral bisphoshponate medication  Would not use Prolia, Reclast, Forteo, or Tymlos at this time  Consider alendronate, risedronate, or ibandronate... this can be initiated by her GYN or another PCP    Start alendronate 70 mg po Q week, reviewed the medication, expected benefits and also possible SE's, dosing, expected duration of use   New alendronate Rx sent to her pharmacy  Monitor for symptom changes  Plan lab testing for calcium, vit D, TSH, PTH with next primary care clinic lab testing   Orders placed  Continue calcium and vitamin D supplement use  Avoid heavy lifting and fall injuries  Repeat DEXA scan in 4/2022    She needs repeat fasting labs with her PCP to focus on the ITP, hyperlipidemia  history  Since she plans to see Dr. Mariscal for general medicine visits, I encouraged her to make the followup appt with him  Addressed patient questions today    Future labs ordered today:   Orders Placed This Encounter   Procedures     Calcium     Parathyroid Hormone Intact     TSH     Vitamin D Deficiency     Radiology/Consults ordered today: None    Total time spent in with the patient evaluation:  35 min  Additional time spent reviewing pertinent lab tests and chart notes, and documentation:  10 min    Follow-up:  4/2022    BLANCA Jessica MD, MS  Endocrinology  [facility]    CC: JOSÉ Mariscal      Video-Visit Details    Type of service:  Video Visit    Video End Time: 2:35 pm    Originating Location (pt. Location): home    Distant Location (provider location):  [facility]/home     Platform used for Video Visit:  Ottoniel

## 2021-05-27 NOTE — TELEPHONE ENCOUNTER
RN cannot approve Refill Request    RN can NOT refill this medication med is not covered by policy/route to provider     . Last office visit: 12/11/2018 Lila Meehan MD Last Physical: Visit date not found Last MTM visit: Visit date not found Last visit same specialty: 12/20/2018 Bettina Salgado MD.  Next visit within 3 mo: Visit date not found  Next physical within 3 mo: Visit date not found      Francia Acuna, [facility] Connection Triage/Med Refill 4/3/2019    Requested Prescriptions   Pending Prescriptions Disp Refills     FLOVENT  mcg/actuation inhaler [Pharmacy Med Name: Flovent HFA Inhalation Aerosol 110 MCG/ACT]  0     Sig: Inhale 2 puffs 2 (two) times a day.    There is no refill protocol information for this order

## 2021-06-01 ENCOUNTER — COMMUNICATION - HEALTHEAST (OUTPATIENT)
Dept: SCHEDULING | Facility: CLINIC | Age: 61
End: 2021-06-01

## 2021-06-02 VITALS — WEIGHT: 122 LBS | HEIGHT: 64 IN | BODY MASS INDEX: 20.83 KG/M2

## 2021-06-02 VITALS — HEIGHT: 65 IN | BODY MASS INDEX: 20.16 KG/M2 | WEIGHT: 121 LBS

## 2021-06-02 VITALS — HEIGHT: 64 IN | WEIGHT: 120 LBS | BODY MASS INDEX: 20.49 KG/M2

## 2021-06-04 NOTE — TELEPHONE ENCOUNTER
Refill Request  Did you contact pharmacy: No  Medication name:   Requested Prescriptions     Pending Prescriptions Disp Refills     albuterol (PROAIR HFA;PROVENTIL HFA;VENTOLIN HFA) 90 mcg/actuation inhaler 1 Inhaler 0     Sig: Inhale 2 puffs every 6 (six) hours as needed for wheezing.     Who prescribed the medication: Bettina Salgado MD   Pharmacy Name and Location: [id number]  Is patient out of medication: Yes  Patient notified refills processed in 72 hours:  yes  Okay to leave a detailed message: no

## 2021-06-04 NOTE — TELEPHONE ENCOUNTER
Refill Approved    Rx renewed per Medication Renewal Policy. Medication was last renewed on 12/11/18.    Francia Acuna, Care Connection Triage/Med Refill 12/6/2019     Requested Prescriptions   Pending Prescriptions Disp Refills     albuterol (PROAIR HFA;PROVENTIL HFA;VENTOLIN HFA) 90 mcg/actuation inhaler 1 Inhaler 0     Sig: Inhale 2 puffs every 6 (six) hours as needed for wheezing.       Albuterol/Levalbuterol Refill Protocol Passed - 12/6/2019  3:38 PM        Passed - PCP or prescribing provider visit in last year     Last office visit with prescriber/PCP: 12/20/2018 Bettina Salgado MD OR same dept: 12/20/2018 Bettina Salgado MD OR same specialty: 12/20/2018 Bettina Salgado MD Last physical: 10/22/2018       Next appt within 3 mo: Visit date not found  Next physical within 3 mo: Visit date not found  Prescriber OR PCP: Bettina Salgado MD  Last diagnosis associated with med order: 1. Wheezing  - albuterol (PROAIR HFA;PROVENTIL HFA;VENTOLIN HFA) 90 mcg/actuation inhaler; Inhale 2 puffs every 6 (six) hours as needed for wheezing.  Dispense: 1 Inhaler; Refill: 0    If protocol passes may refill for 6 months if within 3 months of last provider visit (or a total of 9 months). If patient requesting >1 inhaler per month refill x 6 months and have patient make appointment with provider.

## 2021-06-05 NOTE — TELEPHONE ENCOUNTER
Spoke with patient. She declined asthma check with Dr Salgado as she feels that she's doing well and doesn't need one.   She was reminded that she is overdue for an asthma check and may require one if any other refills are needed of her Albuterol.     She will call back and schedule if she feels she needs this.     Unable to accommodate pt with physical exam appt with Dr Salgado. declined partner at this time.

## 2021-06-05 NOTE — TELEPHONE ENCOUNTER
New Appointment Needed  What is the reason for the visit:    Physical  Provider Preference: PCP only, writer advised there are no openings with Dr Salgado prior to her leaving/retiring. Patient would like to ask PCP if she could fit her in schedule.  How soon do you need to be seen?:  As available, patient would need at least 24 hour notice for work. Please check availability with patient/provider.  Waitlist offered?: Yes  Okay to leave a detailed message:  Yes

## 2021-06-07 NOTE — TELEPHONE ENCOUNTER
lvmtcb - Medications were filled but she needs to have a telephone visit set up. Please assist her in this.

## 2021-06-07 NOTE — TELEPHONE ENCOUNTER
Please call patient and let her know that I did send in 1 inhaler for both her albuterol as well as her Flovent.  She is extremely overdue for a recheck of her asthma.  She has not been seen since December 2018.  She has been told previously that she needed to do a appointment before she could get refills of her medication.  I am refilling 1 additional time at this point but patient needs to be seen for recheck.    At this time it would be best to do a telephone visit with her regarding how she is doing in terms of her asthma.  Please help to arrange a telephone visit with 1 of the providers here at the clinic in the very near future.

## 2021-06-07 NOTE — TELEPHONE ENCOUNTER
FYI - Status Update  Who is Calling: Patient  Update: Patient was reminded of the phone message from 2/6/20 she is overdue for asthma visit and she is aware of needing an establish care visit.  Okay to leave a detailed message?:  Yes

## 2021-06-07 NOTE — TELEPHONE ENCOUNTER
Former patient of [facility] & has not established care with another provider.  Please assign refill request to covering provider per Clinic standard process.  RN cannot approve Refill Request    RN can NOT refill this medication Protocol failed and NO refill given.         Francia Acuna, [facility] Triage/Med Refill 3/31/2020    Requested Prescriptions   Pending Prescriptions Disp Refills     fluticasone propionate (FLOVENT HFA) 110 mcg/actuation inhaler 1 Inhaler 2     Sig: Inhale 2 puffs 2 (two) times a day.       Asthma Medications Refill Protocol Failed - 3/30/2020  8:44 AM        Failed - PCP or prescribing provider visit in last year     Last office visit with prescriber/PCP: 12/20/2018 Bettina Salgado MD OR same dept: Visit date not found OR same specialty: 12/20/2018 Bettina Salgado MD  Last physical: 10/22/2018 Last MTM visit: Visit date not found    Next appt within 3 mo: Visit date not found Next physical within 3 mo: Visit date not found  Prescriber OR PCP: Bettina Salgado MD  Last diagnosis associated with med order: 1. Wheezing  - fluticasone propionate (FLOVENT HFA) 110 mcg/actuation inhaler; Inhale 2 puffs 2 (two) times a day.  Dispense: 1 Inhaler; Refill: 2  - albuterol (PROAIR HFA;PROVENTIL HFA;VENTOLIN HFA) 90 mcg/actuation inhaler; Inhale 2 puffs every 6 (six) hours as needed for wheezing.  Dispense: 1 Inhaler; Refill: 0    If protocol passes may refill for 6 months if within 3 months of last provider visit (or a total of 9 months).             albuterol (PROAIR HFA;PROVENTIL HFA;VENTOLIN HFA) 90 mcg/actuation inhaler 1 Inhaler 0     Sig: Inhale 2 puffs every 6 (six) hours as needed for wheezing.       Albuterol/Levalbuterol Refill Protocol Failed - 3/30/2020  8:44 AM        Failed - PCP or prescribing provider visit in last year     Last office visit with prescriber/PCP: 12/20/2018 Bettina Salgado MD OR same dept: Visit date not found OR same specialty: 12/20/2018 Bettina Salgado MD  Last physical: 10/22/2018       Next appt within 3 mo: Visit date not found  Next physical within 3 mo: Visit date not found  Prescriber OR PCP: Bettina Salgado MD  Last diagnosis associated with med order: 1. Wheezing  - fluticasone propionate (FLOVENT HFA) 110 mcg/actuation inhaler; Inhale 2 puffs 2 (two) times a day.  Dispense: 1 Inhaler; Refill: 2  - albuterol (PROAIR HFA;PROVENTIL HFA;VENTOLIN HFA) 90 mcg/actuation inhaler; Inhale 2 puffs every 6 (six) hours as needed for wheezing.  Dispense: 1 Inhaler; Refill: 0    If protocol passes may refill for 6 months if within 3 months of last provider visit (or a total of 9 months). If patient requesting >1 inhaler per month refill x 6 months and have patient make appointment with provider.

## 2021-06-08 ENCOUNTER — COMMUNICATION - HEALTHEAST (OUTPATIENT)
Dept: SCHEDULING | Facility: CLINIC | Age: 61
End: 2021-06-08

## 2021-06-10 ENCOUNTER — OFFICE VISIT - HEALTHEAST (OUTPATIENT)
Dept: FAMILY MEDICINE | Facility: CLINIC | Age: 61
End: 2021-06-10

## 2021-06-10 ENCOUNTER — RECORDS - HEALTHEAST (OUTPATIENT)
Dept: LAB | Facility: CLINIC | Age: 61
End: 2021-06-10

## 2021-06-10 DIAGNOSIS — Z71.89 ACP (ADVANCE CARE PLANNING): ICD-10-CM

## 2021-06-10 DIAGNOSIS — E44.1 MILD PROTEIN-CALORIE MALNUTRITION (H): ICD-10-CM

## 2021-06-10 DIAGNOSIS — R10.13 ABDOMINAL PAIN, EPIGASTRIC: ICD-10-CM

## 2021-06-10 DIAGNOSIS — Z12.31 VISIT FOR SCREENING MAMMOGRAM: ICD-10-CM

## 2021-06-10 DIAGNOSIS — J45.31 MILD PERSISTENT ASTHMA WITH EXACERBATION: ICD-10-CM

## 2021-06-10 DIAGNOSIS — H91.93 BILATERAL HEARING LOSS, UNSPECIFIED HEARING LOSS TYPE: ICD-10-CM

## 2021-06-10 DIAGNOSIS — B34.9 SYSTEMIC VIRAL ILLNESS: ICD-10-CM

## 2021-06-10 DIAGNOSIS — K92.1 MELANOTIC STOOLS: ICD-10-CM

## 2021-06-10 DIAGNOSIS — Z78.9 INPATIENT HOSPITALIZATION WITHIN LAST 30 DAYS: ICD-10-CM

## 2021-06-10 DIAGNOSIS — R74.8 ELEVATED LIVER ENZYMES: ICD-10-CM

## 2021-06-10 LAB
BASOPHILS # BLD AUTO: 0.1 THOU/UL (ref 0–0.2)
BASOPHILS NFR BLD AUTO: 1 % (ref 0–2)
EOSINOPHIL # BLD AUTO: 0.4 THOU/UL (ref 0–0.4)
EOSINOPHIL NFR BLD AUTO: 4 % (ref 0–6)
ERYTHROCYTE [DISTWIDTH] IN BLOOD BY AUTOMATED COUNT: 14.7 % (ref 11–14.5)
HCT VFR BLD AUTO: 36.9 % (ref 35–47)
HGB BLD-MCNC: 12 G/DL (ref 12–16)
IMM GRANULOCYTES # BLD: 0 THOU/UL
IMM GRANULOCYTES NFR BLD: 0 %
LYMPHOCYTES # BLD AUTO: 3.8 THOU/UL (ref 0.8–4.4)
LYMPHOCYTES NFR BLD AUTO: 38 % (ref 20–40)
MCH RBC QN AUTO: 28.2 PG (ref 27–34)
MCHC RBC AUTO-ENTMCNC: 32.5 G/DL (ref 32–36)
MCV RBC AUTO: 87 FL (ref 80–100)
MONOCYTES # BLD AUTO: 1.4 THOU/UL (ref 0–0.9)
MONOCYTES NFR BLD AUTO: 14 % (ref 2–10)
NEUTROPHILS # BLD AUTO: 4.4 THOU/UL (ref 2–7.7)
NEUTROPHILS NFR BLD AUTO: 44 % (ref 50–70)
PLATELET # BLD AUTO: 837 THOU/UL (ref 140–440)
PMV BLD AUTO: 11.4 FL (ref 8.5–12.5)
RBC # BLD AUTO: 4.25 MILL/UL (ref 3.8–5.4)
WBC: 10.1 THOU/UL (ref 4–11)

## 2021-06-10 ASSESSMENT — MIFFLIN-ST. JEOR: SCORE: 1137.31

## 2021-06-11 ENCOUNTER — COMMUNICATION - HEALTHEAST (OUTPATIENT)
Dept: FAMILY MEDICINE | Facility: CLINIC | Age: 61
End: 2021-06-11

## 2021-06-11 ENCOUNTER — TELEPHONE (OUTPATIENT)
Dept: OBGYN | Facility: CLINIC | Age: 61
End: 2021-06-11

## 2021-06-11 LAB
ALBUMIN SERPL-MCNC: 3.6 G/DL (ref 3.5–5)
ALP SERPL-CCNC: 182 U/L (ref 45–120)
ALT SERPL W P-5'-P-CCNC: 55 U/L (ref 0–45)
ANION GAP SERPL CALCULATED.3IONS-SCNC: 11 MMOL/L (ref 5–18)
AST SERPL W P-5'-P-CCNC: 25 U/L (ref 0–40)
BILIRUB SERPL-MCNC: 0.9 MG/DL (ref 0–1)
BUN SERPL-MCNC: 19 MG/DL (ref 8–22)
CALCIUM SERPL-MCNC: 9.2 MG/DL (ref 8.5–10.5)
CHLORIDE BLD-SCNC: 106 MMOL/L (ref 98–107)
CO2 SERPL-SCNC: 20 MMOL/L (ref 22–31)
CREAT SERPL-MCNC: 0.57 MG/DL (ref 0.6–1.1)
GFR SERPL CREATININE-BSD FRML MDRD: >60 ML/MIN/1.73M2
GLUCOSE BLD-MCNC: 79 MG/DL (ref 70–125)
POTASSIUM BLD-SCNC: 5 MMOL/L (ref 3.5–5)
PROT SERPL-MCNC: 6.8 G/DL (ref 6–8)
SODIUM SERPL-SCNC: 137 MMOL/L (ref 136–145)

## 2021-06-11 NOTE — TELEPHONE ENCOUNTER
Pt calling to give md an update.    Pt was hospitalized at [facility] from 5/31-6/5/21. She is not really sure why. Her liver enzymes were elevated. They had her stop her Fosamax, which Dr. Rodriguez had started her on.  Pt did inform Dr. Rodriguez's office about her hospitalization.       Yesenia KONG RN

## 2021-06-14 NOTE — TELEPHONE ENCOUNTER
I spoke to the patient's  Ankit today inquiring how the patient was doing after her hospitalization.  He said that she was not home but is doing well.  I have asked her to call if there is anything that we can do to help her out  Bebo Mariscal MD FACOG

## 2021-06-15 ENCOUNTER — COMMUNICATION - HEALTHEAST (OUTPATIENT)
Dept: FAMILY MEDICINE | Facility: CLINIC | Age: 61
End: 2021-06-15

## 2021-06-15 NOTE — TELEPHONE ENCOUNTER
Patient just called to ask  when he calls her back he uses her cell phone number which is [phone]

## 2021-06-16 PROBLEM — H91.93 BILATERAL HEARING LOSS, UNSPECIFIED HEARING LOSS TYPE: Status: ACTIVE | Noted: 2021-06-10

## 2021-06-16 PROBLEM — R74.01 TRANSAMINITIS: Status: ACTIVE | Noted: 2021-05-31

## 2021-06-16 PROBLEM — R74.8 ELEVATED LIVER ENZYMES: Status: ACTIVE | Noted: 2021-06-01

## 2021-06-16 PROBLEM — R10.13 ABDOMINAL PAIN, EPIGASTRIC: Status: ACTIVE | Noted: 2021-06-02

## 2021-06-16 PROBLEM — J45.20 MILD INTERMITTENT ASTHMA WITHOUT COMPLICATION: Status: ACTIVE | Noted: 2021-06-02

## 2021-06-16 PROBLEM — J45.31 MILD PERSISTENT ASTHMA WITH EXACERBATION: Status: ACTIVE | Noted: 2021-06-10

## 2021-06-16 PROBLEM — E46 PROTEIN-CALORIE MALNUTRITION (H): Status: ACTIVE | Noted: 2021-06-10

## 2021-06-16 PROBLEM — K92.1 MELANOTIC STOOLS: Status: ACTIVE | Noted: 2021-06-02

## 2021-06-20 NOTE — TELEPHONE ENCOUNTER
I spoke to the patient inquiring how she is doing.  She has a follow-up appointment with endocrinology on 7/13/2021.

## 2021-06-21 NOTE — PROGRESS NOTES
Crys Mendez is a 57 y.o. female is here for a  Health Maintenance exam. Patient is overall doing well.  Patient had a splenectomy in .  She recalls getting a number of vaccines at that time but does not have record of them.  This creates some problems and advising her on what to do next.  I have reviewed her vaccine schedule and recommend that she have a Td every 10 years  Titers prove that her MMR is effective.  Patient recalls having chickenpox as a child.  She has had both pneumonia vaccines.  I am assuming that she has had both meningitis B vaccines and I boosted her meningitis A vaccine last year.  This should be given every 5 years.    Mammogram was done 2017 and her Pap smear was normal 2016.  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      Healthy Habits:   Regular Exercise: Yes  Sunscreen Use: Yes  Healthy Diet: Yes  Dental Visits Regularly: Yes  Seat Belt: Yes  Sexually active: No  Self Breast Exam Monthly:No  Hemoccults: No  Flex Sig: No  Colonoscopy: [id number]  Lipid Profile: Yes  Glucose Screen: Yes  Prevention of Osteoporosis: Yes  Last Dexa: Yes  Guns at Home:  No, yes   Domestic Violence:  No    Current Outpatient Medications Include:  No current outpatient prescriptions on file.    Allergies:  Allergies   Allergen Reactions     Other Allergy (See Comments) Hives and Swelling     Horse        Past Medical History:   Diagnosis Date     Asthma      JENNIFER II (cervical intraepithelial neoplasia II)      ITP (idiopathic thrombocytopenic purpura)        Past Surgical History:   Procedure Laterality Date     BREAST BIOPSY Left      BREAST LUMPECTOMY      benign     CERVICAL CONE BIOPSY      JENNIFER III     SPLENECTOMY, TOTAL      ITP       OB History    Para Term  AB Living   2 2 2   2   SAB TAB Ectopic Multiple Live Births             # Outcome Date GA Lbr Tyrone/2nd Weight Sex Delivery Anes PTL Lv   2 Term            1 Term                   Immunization  History   Administered Date(s) Administered     Hep A, Adult IM (19yr & older) 2016, 2017     Hep B, historic 2016, 10/11/2016, 2017     Influenza, inj, historic,unspecified 2014, 2015, 10/03/2016     Influenza,seasonal quad, PF, 36+MOS 2015, 2017     Influenza,seasonal, Inj IIV3 10/15/2011, 10/30/2012, 10/07/2013     Meningococcal MCV4P 2016     PPD Test 2016, 2016     Pneumo Conj 13-V (2010&after) 2016     Pneumo Polysac 23-V 10/11/2010     Tdap 2016       Family History   Problem Relation Age of Onset     Heart disease Sister      ablations     Thyroid nodules Sister      Diabetes type II Sister      Cancer Mother       of cancer possibly from breast at age 55-60.     Cancer Father      Asthma Father      Thyroid disease Sister      Heart disease Sister       age 55-60?     Skin cancer Maternal Aunt      Colon cancer Maternal Aunt      No Medical Problems Brother      No Medical Problems Brother        Social History     Social History     Marital status:      Spouse name: Blas     Number of children: 2     Years of education: N/A     Occupational History     Pharmacy patient  [employer]     Social History Main Topics     Smoking status: Never Smoker     Smokeless tobacco: Never Used     Alcohol use Yes     Drug use: No     Sexual activity: Not Currently     Partners: Male     Other Topics Concern     Not on file     Social History Narrative    She had a child in  and a second in . She had her first grandchild in .        Last cholesterol:   Lab Results   Component Value Date    CHOL 205 (H) 2016    CHOL 237 (H) 2015     Lab Results   Component Value Date    HDL 72 2016    HDL 74 2015     Lab Results   Component Value Date    LDLCALC 119 2016    LDLCALC 147 (H) 2015     Lab Results   Component Value Date    TRIG 68 2016    TRIG 82 2015     No  "components found for: CHOLHDL    Mammogram 1/19/2017  Pap smear 12/21/2016, she prefers to repeat it today even though it was normal 2 years ago.      Birth Control Method: Not applicable  High Risk/Behavior: None      LMP: No LMP recorded. Patient is postmenopausal.      Review of Systems:   General:  Denies fever, chills, HA, fatigue, myalgias, weight change    Eyes: Denies vision changes   Ears/Nose/Throat: Denies nasal congestion, rhinorrhea, ear pain or discharge, sore throat, swollen glands  Cardiovascular: Denies CP, palpitations  Respiratory:  Denies SOB, cough  Gastrointestinal:  Denies changes in bowel habits, melena, rectal bleeding,  Genitourinary: Denies changes in urine habits/frequency/dysuria, hematuria   Musculoskeletal:  Denies  joint pain or swelling or erythema, edema  Skin: Denies rashes   Neurologic: Denies weakness, paresthesia  Psychiatric: Denies mood changes   Endocrine: Denies polyuria, polydipsia, polyphagia  Heme/Lymphatic: Denies problem with bleeding   Allergic/Immunologic: Denies problem     POSITIVES: 112 point review of systems is negative.      PHYSICAL EXAM:    /70  Pulse 72  Temp 98.1  F (36.7  C)  Ht 5' 4.5\" (1.638 m)  Wt 121 lb (54.9 kg)  Breastfeeding? No  BMI 20.45 kg/m2    Wt Readings from Last 3 Encounters:   12/21/16 130 lb (59 kg)   12/09/15 124 lb (56.2 kg)       There is no height or weight on file to calculate BMI.    Well developed, well nourished, no acute distress.  HEENT: normocephalic/atraumatic, PERRLA/EOMI, TMs: Gray, normal light reflex, no nasal discharge.  Oral mucosa: no erythema/exudate  Neck: No LAD/masses/thyromegaly/bruits  Lungs: clear bilaterally  Heart: regular rate and rhythm, no murmurs/gallops/rubs  Breasts: symmetric, no masses/skin changes, nipple discharge, or axillary LAD.  BSE reviewed.  Abdomen: Normal bowel sounds, soft, non-tender, non-distended, no masses, neg Morel's/McBurney's, no rebound/guarding  Genital: Normal external " genitalia, no discharge, no lesions, cervix is non-friable, os is closed, no CMT, no adnexal tenderness or fullness.  Uterus is not enlarged, perineum intact.  Thin prep done.    Rectal: internal exam is deferred.  External exam is normal.  Lymphatics: no supraclavicular/axillary/epitrochlear/inguinal LAD. No edema.  Neuro: A&O x 3, CN II-XII intact, strength 5/5, reflexes symmetric, sensory intact to light touch.  Psych: Behavior appropriate, engaging.  Thought processes congruent, non-tangential.  Musculoskeletal: Extremities normal, atraumatic, no swelling  Skin: no rashes or lesions.      No results found for this or any previous visit (from the past 240 hour(s)).    ASSESSMENT/PLAN: 57 y.o. female physical exam and pap smear.          1. Routine general medical examination at a health care facility  Normal exam  - Thyroid Stimulating Hormone (TSH)  - HM2(CBC w/o Differential)  - Basic Metabolic Panel  - Lipid Cascade  - Vitamin D, Total (25-Hydroxy)    2. Visit for screening mammogram    - Mammo Screening Bilateral; Future  - Gynecologic Cytology (PAP Smear)  - HPV High Risk DNA Cervical    3. Flu vaccine need    - Influenza, Seasonal Quad, Preservative Free 36+ Months    4. Thrombocytosis (H)    - HM2(CBC w/o Differential)    5. Vitamin D deficiency    - Vitamin D, Total (25-Hydroxy)      There are no discontinued medications.    Routine health maintenance discussion:  No smoking, limited alcohol (7 or less servings per week), 5 fruits/veg servings per day, 200 minutes of exercise per week.  Daily calcium/vitamin D guidelines, bone health, yearly mammogram after age 39/regular pap smears/colon cancer screening beginning at age 50.  Accident avoidance, sun screen.      Will contact her with the results of the labs when available.    Bettina Salgado M.D.

## 2021-06-22 NOTE — PROGRESS NOTES
PROGRESS NOTE   12/11/2018    SUBJECTIVE:  Crys Mendez is a 58 y.o. female  who presents for   Chief Complaint   Patient presents with     Asthma     has been noticing an increase in asthma symptoms since before Thanksgiving and it keeps getting worse and worse     Patient comes in today because she has noticed that her asthma seems to be increasing.  She started having increasing asthma symptoms before Thanksgiving and it seems like it is gradually getting worse.  She notes that she had asthma as a child and had fairly significant symptoms as result of it but recently she has not had any problems.  She has been really good for several years.  She did have an albuterol inhaler at home but she was barely using it at all.  She noticed that around Thanksgiving time or just before Thanksgiving that her breathing seemed to worsen so she started to use her inhaler.  She has had to use her inhaler more and more all the time.  Last night she had to use her inhaler just about every hour.  She woke up just about every hour to use her inhaler and still was having difficulty.  She comes in today for evaluation.  She does not feel ill in any way.  She has not had any cold type symptoms at all since Thanksgiving time.  She definitely notes that the asthma seems to be worse when she is home then when she is at work.  She does seem to have attacks when she is home where her breathing seems to be worse but those attacks seem to be much less when she is at work.  She notes that today she last used her inhaler at about 1030 and it is now about 115 pm.     Patient Active Problem List   Diagnosis     Open Wound Of The Right Little Finger     Arthralgia     Skin: A Rash     Thrombocytosis (H)     Vitamin D deficiency       Current Outpatient Medications   Medication Sig Dispense Refill     albuterol (PROAIR HFA;PROVENTIL HFA;VENTOLIN HFA) 90 mcg/actuation inhaler Inhale 2 puffs every 6 (six) hours as needed for wheezing. 1  "Inhaler 0     ascorbic acid, vitamin C, (ASCORBIC ACID WITH MESERET HIPS) 500 MG tablet Take 500 mg by mouth daily.       cholecalciferol, vitamin D3, (VITAMIN D3) 2,000 unit capsule Take 2,000 Units by mouth daily.       fluticasone (FLOVENT HFA) 110 mcg/actuation inhaler Inhale 2 puffs 2 (two) times a day. 1 Inhaler 0     predniSONE (DELTASONE) 10 mg tablet 4 tabs a day for 6 days, 3 tabs a day for 3 days, 2 tabs a day for 3 days, 1 tab a day for 3 days, then stop.. 42 tablet 0     No current facility-administered medications for this visit.        Allergies   Allergen Reactions     Other Allergy (See Comments) Hives and Swelling     Horse        Past Medical History:   Diagnosis Date     Asthma      JENNIFER III (cervical intraepithelial neoplasia grade III) with severe dysplasia 1991    Cone biopsy       Past Surgical History:   Procedure Laterality Date     BREAST BIOPSY Right      BREAST LUMPECTOMY      benign     CERVICAL CONE BIOPSY  1991    JENNIFER III     SPLENECTOMY, TOTAL  1984    ITP       Social History     Tobacco Use   Smoking Status Never Smoker   Smokeless Tobacco Never Used       OBJECTIVE:     BP (!) 144/94 (Patient Site: Right Arm, Patient Position: Sitting, Cuff Size: Adult Regular)   Pulse (!) 102   Temp 98  F (36.7  C) (Oral)   Resp 16   Ht 5' 4\" (1.626 m)   Wt 120 lb (54.4 kg)   SpO2 91%   BMI 20.60 kg/m      Physical Exam:  GENERAL APPEARANCE: A&A, NAD however can barely speak to me in full sentences without some degree of shortness of breath, well hydrated, well nourished  Oxygenation saturation at the beginning of the exam was 91%.  SKIN:  Normal skin turgor, no lesions/rashes   HEENT: moist mucous membranes, no rhinorrhea, PERRLA, TM's clear bilaterally, Throat without significant erythema or exudate.  NECK: Supple, full ROM, no significant lymphadenopathy or thyromegaly  CV: RRR, no M/G/R   LUNGS: Clear to auscultation without crackles however audible wheezing was appreciated just entering " the room as well as wheezing was appreciated on auscultation.  EXTREMITY: no swelling noted.  Full range of motion of all 4 extremities.   NEURO: no gross deficits   PSYCHIATRIC;  Mood appropriate, memory intact    Patient was given an albuterol neb while here in the clinic and following that on re-auscultation of her lungs there is no wheezing appreciated.  The audible wheezing that was appreciated prior to the nebulizer treatment has completely resolved.  Oxygenation saturation following the neb treatment was up to 97%.  She has much improved air exchange following the nebulizer treatment.    ASSESSMENT/PLAN:     Wheezing [R06.2]      1. Wheezing  - albuterol nebulizer solution 3 mL (PROVENTIL); Take 3 mL by nebulization once while in the office      - albuterol (PROAIR HFA;PROVENTIL HFA;VENTOLIN HFA) 90 mcg/actuation inhaler; Inhale 2 puffs every 6 (six) hours as needed for wheezing.  Dispense: 1 Inhaler; Refill: 0  - fluticasone (FLOVENT HFA) 110 mcg/actuation inhaler; Inhale 2 puffs 2 (two) times a day.  Dispense: 1 Inhaler; Refill: 0  - predniSONE (DELTASONE) 10 mg tablet; 4 tabs a day for 6 days, 3 tabs a day for 3 days, 2 tabs a day for 3 days, 1 tab a day for 3 days, then stop..  Dispense: 42 tablet; Refill: 0    Patient overall seems to be doing okay.  She does not really seem to be any distress on presentation to our clinic however audible wheezing was appreciated and oxygenation saturation was 91%.  Patient was quite hesitant to have any treatment done here in the office however I assured her that at a sat of 91% she needed a nebulizer treatment now and I needed to make sure that her oxygenation was going to improve after the nebulizer treatment.  Once the nebulizer treatment was given she had significant improvement in her air exchange, air entry and wheezing.  She did not have any wheezing after the nebulizer treatment was given.  At this point I think she is having exacerbation of her asthma.  I think  she would benefit from a prednisone taper.  I am going to have her take 40 mg of prednisone a day for the next 6 days and then she should take 30 mg a day for 3 days 20 mg a day for 3 days and then 10 mg a day for 3 days and then she can stop.  I am also going to start her on Flovent inhaler that she needs to use twice a day no matter how she is feeling.  I also did give her another refill of her albuterol inhaler.  She did seem to have remarkable improvement in her symptoms once we gave her nebulizer treatment here in the office.  I have asked her to return to clinic in a week to see Dr. Salgado, her primary doctor, for recheck of her asthma and how she is doing once she has started the Flovent inhaler on a regular basis.  All of her questions were answered today.  If she has additional questions or concerns will let me know.  Otherwise will be seen in about a week by Dr. Salgado for recheck of her asthma. Total time spent with patient was at least 25 minutes,  of which greater than fifty percent was spent in counselling and coordination of care of the above medical problems.  Of note is that patient symptoms seem to be worse when she is at home.  She notes that she has cleaned everything and try to eliminate all the allergens from her home but really was unable to find a whole lot of allergens that could be triggering her asthma.  She is kind of perplexed as to why her asthma has suddenly gotten worse but I am hoping with the use of the consistent inhalers that she will have improvement in her symptoms.  Lila Meehan MD

## 2021-06-22 NOTE — PROGRESS NOTES
PROGRESS NOTE       SUBJECTIVE:  Crys Mendez is a 58 y.o. female   Chief Complaint   Patient presents with     Follow-up     asthma  wheezing last week, doing better    Patient is here to review her asthma.  She started wheezing last week and she is now doing much better.  It started before Thanksgiving and she has done everything she can think of to make her environment better.  She noticed the problem more when she was at home than any other place.  She has never had problems with asthma with viruses and she has not been sick lately.  She has no known allergies.  The call the ear has not been unusually cold yet.  She has not been exercising any differently.  She has had asthma at least 25 years.  Her cough is better and she is 98% better and has been using Flovent 2 puffs twice a day with the albuterol 2 puffs every 6 hours as needed.  She and her  have cleaned her house including draperies carpeting etc. and they even cleaned the air ducts.  She still has more symptoms inside the home than anywhere else.      Patient Active Problem List   Diagnosis     Open Wound Of The Right Little Finger     Arthralgia     Skin: A Rash     Thrombocytosis (H)     Vitamin D deficiency       Current Outpatient Medications   Medication Sig Dispense Refill     albuterol (PROAIR HFA;PROVENTIL HFA;VENTOLIN HFA) 90 mcg/actuation inhaler Inhale 2 puffs every 6 (six) hours as needed for wheezing. 1 Inhaler 0     ascorbic acid, vitamin C, (ASCORBIC ACID WITH MESERET HIPS) 500 MG tablet Take 500 mg by mouth daily.       cholecalciferol, vitamin D3, (VITAMIN D3) 2,000 unit capsule Take 2,000 Units by mouth daily.       fluticasone (FLOVENT HFA) 110 mcg/actuation inhaler Inhale 2 puffs 2 (two) times a day. 1 Inhaler 0     predniSONE (DELTASONE) 10 mg tablet 4 tabs a day for 6 days, 3 tabs a day for 3 days, 2 tabs a day for 3 days, 1 tab a day for 3 days, then stop.. 42 tablet 0     No current facility-administered  medications for this visit.        Social History     Tobacco Use   Smoking Status Never Smoker   Smokeless Tobacco Never Used       REVIEW OF SYSTEMS:  Patient denies fever, chills, dizziness, headache, visual change, ear pain,  chest pain,  abdominal pain, extremity pain or swelling, rash,  depression or anxiety.      Positives: Shortness of breath and cough with wheezes    OBJECTIVE:       Vitals:    12/20/18 0904   BP: 100/62   Pulse: 78   Temp: 97.5  F (36.4  C)   SpO2: 98%     Weight: 122 lb (55.3 kg)    Wt Readings from Last 3 Encounters:   12/20/18 122 lb (55.3 kg)   12/11/18 120 lb (54.4 kg)   10/22/18 121 lb (54.9 kg)     Body mass index is 20.94 kg/m .        Physical Exam:  GENERAL APPEARANCE: A&A, NAD, well hydrated, well nourished  SKIN:  Normal skin turgor, no lesions/rashes   EARS: TM's normal, gray with nl light reflex  OROPHARYNX: without erythema, no post nasal drainage or thrush  NECK: Supple, without lymphadenopathy, no thyroid mass  CV: RRR, no M/G/R   LUNGS: CTAB, normal respiratory effort, no wheezes are appreciated today and there are no tubular sounds.  EXTREMITY: Extremities normal, atraumatic, no swelling  NEURO: no gross deficits   PSYCHIATRIC:  Mood appropriate, memory intact        ASSESSMENT/PLAN:     1. Moderate persistent asthma without complication  I recommended that she continue both in inhalers.  She should use fluticasone 2 puffs twice daily and the albuterol only as needed.  She will be going off oral prednisone in the next few days.  She should continue the Flovent for at least another month.  She understands that this is her controller and the albuterol is the reliever.  It may be worthwhile if she has symptoms persisting over the next 4 weeks for her to see allergy.  She will let me know if this is a problem.    I spent a total of 25 minutes face to face with the patient.  Over 50% of the time spent counseling and educating the patient about all of the above.      Bettina Chi  MD [facility]

## 2021-06-23 ENCOUNTER — COMMUNICATION - HEALTHEAST (OUTPATIENT)
Dept: LAB | Facility: CLINIC | Age: 61
End: 2021-06-23

## 2021-06-23 DIAGNOSIS — R79.89 ELEVATED PLATELET COUNT: ICD-10-CM

## 2021-06-24 ENCOUNTER — AMBULATORY - HEALTHEAST (OUTPATIENT)
Dept: LAB | Facility: CLINIC | Age: 61
End: 2021-06-24

## 2021-06-24 ENCOUNTER — TRANSFERRED RECORDS (OUTPATIENT)
Dept: HEALTH INFORMATION MANAGEMENT | Facility: CLINIC | Age: 61
End: 2021-06-24

## 2021-06-24 DIAGNOSIS — R79.89 ELEVATED PLATELET COUNT: ICD-10-CM

## 2021-06-24 LAB
BASOPHILS # BLD AUTO: 0.1 THOU/UL (ref 0–0.2)
BASOPHILS NFR BLD AUTO: 1 % (ref 0–2)
EOSINOPHIL # BLD AUTO: 0.9 THOU/UL (ref 0–0.4)
EOSINOPHIL NFR BLD AUTO: 12 % (ref 0–6)
ERYTHROCYTE [DISTWIDTH] IN BLOOD BY AUTOMATED COUNT: 14.1 % (ref 11–14.5)
HCT VFR BLD AUTO: 42.9 % (ref 35–47)
HGB BLD-MCNC: 13.9 G/DL (ref 12–16)
IMM GRANULOCYTES # BLD: 0 THOU/UL
IMM GRANULOCYTES NFR BLD: 0 %
LYMPHOCYTES # BLD AUTO: 3.5 THOU/UL (ref 0.8–4.4)
LYMPHOCYTES NFR BLD AUTO: 43 % (ref 20–40)
MCH RBC QN AUTO: 29 PG (ref 27–34)
MCHC RBC AUTO-ENTMCNC: 32.4 G/DL (ref 32–36)
MCV RBC AUTO: 90 FL (ref 80–100)
MONOCYTES # BLD AUTO: 0.7 THOU/UL (ref 0–0.9)
MONOCYTES NFR BLD AUTO: 9 % (ref 2–10)
NEUTROPHILS # BLD AUTO: 2.8 THOU/UL (ref 2–7.7)
NEUTROPHILS NFR BLD AUTO: 35 % (ref 50–70)
PLATELET # BLD AUTO: 551 THOU/UL (ref 140–440)
PMV BLD AUTO: 11.3 FL (ref 7–10)
RBC # BLD AUTO: 4.79 MILL/UL (ref 3.8–5.4)
WBC: 8 THOU/UL (ref 4–11)

## 2021-06-25 ENCOUNTER — RECORDS - HEALTHEAST (OUTPATIENT)
Dept: FAMILY MEDICINE | Facility: CLINIC | Age: 61
End: 2021-06-25

## 2021-06-25 NOTE — TELEPHONE ENCOUNTER
Patient is calling wanting to know her test results. There was not a note for me to relay, please call patient back as soon as possible as patient seem to be a little upset she has not yet been notified what the results are. Thank you.

## 2021-06-25 NOTE — TELEPHONE ENCOUNTER
Attempted to reach patient to discuss recent lab work. I did inform her that I was leaving for the day and will try to reach her again on Monday when I return to clinic.

## 2021-06-25 NOTE — TELEPHONE ENCOUNTER
Please call and let her know that I will call her on my lunch break between 1 and 2 pm. I have a full schedule today so this is the only time I have available to speak with her.

## 2021-06-25 NOTE — TELEPHONE ENCOUNTER
Called and informed patient that Clau was unexpectedly out today but will be back in clinic tomorrow and can relay the results to her then. Patient asking that Clau please call her cell phone number at [phone]

## 2021-06-25 NOTE — TELEPHONE ENCOUNTER
Patient came in to clinic demanding a copy of her lab results. I informed her that I was the CMA that spoke with her last night and informed her that Clau would call her today and inform her of her lab results and she surely could have a copy of them once she speaks with Clau. Patient would like Clau to call her cell phone at [phone].    Concepción Hernandez, [facility]

## 2021-06-26 NOTE — PROGRESS NOTES
1. Inpatient hospitalization within last 30 days     2. Melanotic stools  HM1(CBC and Differential)   3. Abdominal pain, epigastric  HM1(CBC and Differential)   4. Elevated liver enzymes  Comprehensive Metabolic Panel   5. Systemic viral illness  HM1(CBC and Differential)   6. Mild protein-calorie malnutrition (H)     7. Mild persistent asthma with exacerbation     8. Bilateral hearing loss, unspecified hearing loss type  Ambulatory referral to Audiology   9. Visit for screening mammogram     10. ACP (advance care planning)       Post Discharge Medication Reconciliation Status: discharge medications reconciled, continue medications without change      Assessment & Plan     Inpatient hospitalization within last 30 days    Inpatient 6 days  We reviewed all of the documentation from her recent hospitalization  Follow-up lab work was ordered today  She is still taking oral doxycycline    Melanotic stools    - HM1(CBC and Differential)  resolved    Abdominal pain, epigastric    - HM1(CBC and Differential)  resolved    Elevated liver enzymes    - Comprehensive Metabolic Panel  Check serum lab work today for follow up    Systemic viral illness    - HM1(CBC and Differential)  We reviewed all of her lab results from her recent hospitalization, all of her infectious disease studies were negative    Mild protein-calorie malnutrition (H)    Most recent protein level 5.1, recheck today  Appetite is not quite back at baseline  She has lost anywhere from 5 to 10 pounds since her hospitalization    Mild persistent asthma with exacerbation    Currently in the green zone  ACT score: 24  Asthma triggers reviewed  Patient is not using her Flovent daily, she only uses it as needed, she has not used her albuterol for over 3 weeks  She refuses to use her Flovent daily due to her history of osteoporosis, she worries about using too many steroids    Bilateral hearing loss, unspecified hearing loss type    - Ambulatory referral to  Audiology  She does wear hearing aids, she does not have them in place today but she has noticed ever since she was diagnosed with a systemic viral illness that her hearing has been a bit worse even with her hearing aids in place  She will follow up with audiology for hearing screening    Visit for screening mammogram    She states that she did have a mammogram completed in the last year but it was outside of [place]  I do not have those results on file at this time  I asked her to get her records sent over here if she plans on staying at this clinic, she has not quite decided yet if she is going to be staying at the [facility].  If she decides to establish care here at the [facility], she prefers to establish with Dr. Thomason.  Her previous PCP, Dr. Salgado is now retired    ACP (advance care planning)    She was given an honoring choices packet to complete while she was hospitalized, she will continue to work on this at home and return as her PCP when she decides on a primary clinic location      26 minutes spent on the date of the encounter doing chart review, review of test results, interpretation of tests, patient visit and documentation        Return in about 2 weeks (around 6/24/2021), or if symptoms worsen or fail to improve.    Clau Cano NP  [facility]   Crys Mendez is 60 y.o. and presents today for the following health issues: inpatient follow up, asthma check   HPI Hospital summary: 60-year-old female presents complaining of fever on and off for the previous 3 days with a T-max of 104.  Patient had shaking chills with minimal benefit from Tylenol.  She did have some nausea and mild epigastric discomfort.  She been in [facility] 6 weeks prior had no sick contacts.  In the ER she was found to have elevated AST and ALT and leukopenia.  She was admitted.  She is status post splenectomy for ITP.     Febrile illness with  elevated LFTs and alkaline phosphatase.  Etiology unclear.  Patient was seen by gastroenterology and infectious disease during his hospitalization.  She had multiple tests undertaken and was Covid negative with Covid unit immune status positive.  There was concern about Coccidioides due to the patient's recent travel although testing was negative.  Tickborne illness work-up essentially negative as well.  CMV negative.  Patient's transaminases were trending down although her alkaline phosphatase was trending up albeit slowly.  Repeat ultrasound of the right upper quadrant on the date of discharge revealed some stones but no obvious cholecystitis.  Patient was tolerating oral intake and was afebrile.  She was requesting discharge from the hospital.  She was on meropenem and doxycycline during his hospitalization.  Will be discharged on oral doxycycline for 8 more days 100 p.o. twice daily.  Repeat LFTs and hemogram 1 within the next 3 to 5 days recommended.  We did stop the patient's alendronate due to elevated liver function tests.  This is a rare complication.  Follow-up with primary to discuss resumption after LFTs normalize.  Leukocytosis.  Initially presented with a leukopenia consistent with a viral illness.  No leukocytosis.  Trending up but likely represents rebounding from previous leukopenia.  Will need to be trended.  Patient is afebrile.  Repeat hemogram 1 at follow-up.  Reported dark stool.  Guaiac done at the time was negative.  Patient has no stigmata of GI bleeding.      Today in clinic: Notes hearing is now worse since initial symptoms started. She does wear bilateral hearing aides. Epigastric pain is resolved. Denies any recent fevers. Appetite is not quite back to baseline yet. She is having normal BM's, UO is adequate. Sleeping okay, she has been experiencing left sides arm pain from a lab draw while hospitalized. Pain radiates up into the shoulder/neck and down into the thumb and index finger on  "the right. She is still taking the Doxycycline. She is not taking her Fosamax currently, she will discuss this with her endocrinologist at her next appointment. She has not decided if she is going to continue coming to this clinic since her PCP is now retired. Her mammogram was completed recently outside of [facility]. Her asthma is currently well controlled. She is not using her Flovent twice daily, only PRN. Last Albuterol use was 3 weeks ago. Triggers for asthma include dust, mold, exercise and weather changes. She has an honoring choices packet that was given to her while hospitalized.         Review of Systems        Objective    /76   Pulse 74   Resp 18   Ht 5' 4\" (1.626 m)   Wt 128 lb 6 oz (58.2 kg)   SpO2 97%   Breastfeeding No   BMI 22.04 kg/m    Body mass index is 22.04 kg/m .  Physical Exam      Review of Systems     Denies fever, chills, visual changes, fatigue, myalgias, nasal congestion, rhinorrhea, ear pain, or discharge, sore throat, swollen glands, breast mass, nipple discharge, breast changes, abdominal pain, cough, shortness of breath, chest pain,  change in bowel habits, melena, rectal bleeding, dysuria, frequency, urgency, hematuria, polyuria, polydipsia, polyphagia, joint pain or swelling or erythema, edema, rash,  paresthesias, vaginal discharge or bleeding or mood changes.       Objective:         /76   Pulse 74   Resp 18   Ht 5' 4\" (1.626 m)   Wt 128 lb 6 oz (58.2 kg)   SpO2 97%   Breastfeeding No   BMI 22.04 kg/m       Physical Exam:  General Appearance: Alert, cooperative, no distress, appears stated age  Ears: difficulty hearing noted  Lungs: Clear to auscultation bilaterally, respirations unlabored  Heart: Regular rate and rhythm, S1 and S2 normal, no murmur, rub, or gallop  Abdomen: Soft, non-tender, bowel sounds active all four quadrants,  no masses, no organomegaly  Skin: Skin color, texture, turgor normal, no rashes or lesions, pink, warm and " dry  Neurologic: Normal, no gross deficits noted

## 2021-06-29 ENCOUNTER — COMMUNICATION - HEALTHEAST (OUTPATIENT)
Dept: FAMILY MEDICINE | Facility: CLINIC | Age: 61
End: 2021-06-29

## 2021-06-30 ENCOUNTER — RECORDS - HEALTHEAST (OUTPATIENT)
Dept: AUDIOLOGY | Facility: CLINIC | Age: 61
End: 2021-06-30

## 2021-07-04 NOTE — TELEPHONE ENCOUNTER
Telephone Encounter by Clau Cano NP at 6/29/2021  4:15 PM     Author: Clau Cano NP Service: -- Author Type: Nurse Practitioner    Filed: 6/29/2021  4:15 PM Encounter Date: 6/29/2021 Status: Signed    : Clau Cano NP (Nurse Practitioner)       Spoke with Angelica regarding results, she is handling this and is going to follow up with the patient.

## 2021-07-04 NOTE — TELEPHONE ENCOUNTER
Telephone Encounter by Fariha Graff at 6/30/2021 10:30 AM     Author: Fariha Graff Service: -- Author Type: --    Filed: 6/30/2021 10:32 AM Encounter Date: 6/30/2021 Status: Signed    : Fariha Graff       Patient calling regarding her referral for Bilateral hearing loss, unspecified hearing loss type. She states she was in the hospital for a week and did not have her HA in while she was there. She would like to talk to someone before coming in.     Thankyou

## 2021-07-04 NOTE — TELEPHONE ENCOUNTER
Telephone Encounter by Fransico Greenwood at 6/29/2021  3:12 PM     Author: Fransico Greenwood Service: -- Author Type: Patient Access    Filed: 6/29/2021  3:13 PM Encounter Date: 6/29/2021 Status: Signed    : Fransico Greenwood (Patient Access)       Reason for Call:  Request for results:    Name of test or procedure: lab    Date of test of procedure: 6/24/21    Location of the test or procedure:  clinic    OK to leave the result message on voice mail or with a family member? Yes    Phone number Patient can be reached at: Home [phone]    Additional comments: anytime    Call taken on 6/29/2021 at 3:13 PM by Fransico Greenwood

## 2021-07-04 NOTE — TELEPHONE ENCOUNTER
Telephone Encounter by Angelica Gonzalez CMA at 6/29/2021  4:19 PM     Author: Angelica Gonzalez CMA Service: -- Author Type: Certified Medical Assistant    Filed: 6/29/2021  4:30 PM Encounter Date: 6/29/2021 Status: Signed    : Angelica Gonzalez CMA (Certified Medical Assistant)       Spoke with patient. She states she's been waiting on a call from [facility] as this is she reports was told to her last week from covering provider's team.    CMP wasn't rechecked because liver function was improved 6/10/21.  CBC was reviewed again. WBC normal. Platelets decreased which is good news per Clau Cano.  No hematology consult needed as platelets have come down. EMG not needed as pt states arm pain is better.     F/u with endocrinology.  All information per Clau Cano CNP

## 2021-07-06 VITALS
BODY MASS INDEX: 21.92 KG/M2 | RESPIRATION RATE: 18 BRPM | HEIGHT: 64 IN | HEART RATE: 74 BPM | OXYGEN SATURATION: 97 % | WEIGHT: 128.38 LBS | DIASTOLIC BLOOD PRESSURE: 76 MMHG | SYSTOLIC BLOOD PRESSURE: 122 MMHG

## 2021-07-07 NOTE — TELEPHONE ENCOUNTER
Pt called with names of providers    Dr. Bebo Jessica, Endocrinology.      She also is waiting to hear back on lab result also.

## 2021-07-07 NOTE — TELEPHONE ENCOUNTER
Please call patient.  Savita will be back next week.  However, white count has continued to drop.  Platelets have also continued to drop.  No concerns going into the weekend.    Carlos Ferris, CNP

## 2021-07-07 NOTE — TELEPHONE ENCOUNTER
Reason for Call:  Request for results:    Name of test or procedure: labs from here    Date of test of procedure: 6.24.21    Location of the test or procedure: [facility]    OK to leave the result message on voice mail or with a family member? Yes    Phone number Patient can be reached at:   Cell number on file:    Telephone Information:   Mobile [phone]       Additional comments: would like a call today with these results    Call taken on 6/25/2021 at 12:59 PM by Lisseth Diaz

## 2021-07-07 NOTE — TELEPHONE ENCOUNTER
Will fax to [facility] [phone]  Left message with patient asking which OB facility she goes to.

## 2021-07-08 ASSESSMENT — ASTHMA QUESTIONNAIRES: ACT_TOTALSCORE: 24

## 2021-07-13 ENCOUNTER — VIRTUAL VISIT (OUTPATIENT)
Dept: ENDOCRINOLOGY | Facility: CLINIC | Age: 61
End: 2021-07-13
Payer: COMMERCIAL

## 2021-07-13 DIAGNOSIS — M81.0 AGE-RELATED OSTEOPOROSIS WITHOUT CURRENT PATHOLOGICAL FRACTURE: Primary | ICD-10-CM

## 2021-07-13 PROCEDURE — 99214 OFFICE O/P EST MOD 30 MIN: CPT | Mod: 95 | Performed by: INTERNAL MEDICINE

## 2021-07-13 NOTE — LETTER
7/13/2021         RE: Crys Quarles  [address] [address]        Dear Colleague,    Thank you for referring your patient, Crys Quarles, to the [facility]. Please see a copy of my visit note below.    Patient is being evaluated via a billable video visit.      How would you like to obtain your AVS? Reviewed verbally  If the video visit is dropped, the invitation should be resent by cell phone  Will anyone else be joining your video visit? no      Video Start Time: 3:30 pm    Video-Visit Details    Type of service:  Video Visit    Video End Time: 3:50 pm    Originating Location (pt. Location): home    Distant Location (provider location):  [facility]/home    Platform used for Video Visit:  [employer]        Recent issues:  Osteoporosis follow-up evaluation  Patient had acute illness and [facility] hospitalization late 5/2021  Febrile illness with GI symptoms and elevated liver tests, cause unclear  Her alendronate med discontinued         1982. History of ITP, then Prednisone medication for ~2 yrs duration  Menopause early 50's, no subsequent HRT  Previous medical evaluations with Dr. Bettina Salgado/[facility]    1/23/17 DEXA:   L1-4    T-score:  -0.4    Left fem neck   T-score: -2.0   Right fem neck  T-score: 12.2    12/2020. GYN evaluation with Dr. Bebo Mariscal/[facility]   1/28/21 DEXA:               Lumbar Spine (L1-L4)       T-score:  -0.4, degenerative changes present               Left Femoral Neck             T-score:  -2.5               Right Femoral Neck           T-score:  -2.5    Health history includes:  Bone fractures:   none  Vit D deficiency:   yes   Kidney stones:   none  Osteoporosis med:   none previously  Supplements:    MVI 1-tab daily       Calcium 600 mg 1-tab daily       Vit C 500 mg 1-tab daily  Previous [facility] labs include:     Lab Test 12/15/20  1534   VITDT 49         4/2/21. Initial  osteoporosis evaluation with me at [facility]  Reviewed health history and osteoporosis management  Advised starting alendronate medication with weekly dosing  She recalls having some abdominal bloating while on the medication    5/31/21. Hospitalization with cold intolerance, then febrile illness  Subsequently had COVID-19 test which was negative  Went to [facility] ED and admitted  GI symptoms, elevated liver tests  Decision made to stop alendronate medication, last dose 5/28/21  Repeat lab testing after hospitalization, recalls improvement with liver tests    Recent [facility] labs include:  Lab Results   Component Value Date     06/03/2021    POTASSIUM 4.1 06/03/2021    CHLORIDE 113 (H) 06/03/2021    CO2 24 06/03/2021    ANIONGAP 2 (L) 06/03/2021    GLC 98 06/03/2021    BUN 4 (L) 06/03/2021    CR 0.55 (L) 06/03/2021    GFRESTIMATED >60 06/03/2021    GFRESTBLACK >60 06/03/2021    ROBERT 7.5 (L) 06/03/2021    TSH 2.66 10/22/2018    VITDT 49 12/15/2020         Lives in [place], [place]  Sees Dr. Bebo Mariscal for medicine evaluations.    PMH/PSH:  Past Medical History:   Diagnosis Date     Abdominal pain, epigastric 6/2/2021     Age-related osteoporosis without current pathological fracture      Asthma      Asthma      JENNIFER III (cervical intraepithelial neoplasia grade III) with severe dysplasia 1991    [facility] biopsy     Hyperlipidemia LDL goal <130      Idiopathic thrombocytopenic purpura (H) 1981     Melanotic stools 6/2/2021     Mild intermittent asthma without complication 6/2/2021     Past Surgical History:   Procedure Laterality Date     BIOPSY BREAST Right      BREAST BIOPSY, CORE RT/LT      benign     CONIZATION  1991    JENNIFER III     LASER TX, CERVICAL  12/23/91    Laser TX Cervical JENNIFER 3 neg margins, no invasion     LUMPECTOMY BREAST      benign     SPLENECTOMY       SPLENECTOMY, TOTAL  1984    ITP     WISDOM ST GUIDEWI         Family Hx:  Family History   Problem Relation Age of Onset     Diabetes Sister       Heart Disease Sister 68        heart attack     Thyroid Disease Sister      Heart Disease Sister          due to heart issues     Heart Disease Sister      Cancer Mother          of cancer possibly from breast at age 55-60.     Breast Cancer Mother      Cancer Father      Cancer - colorectal Maternal Aunt      Heart Disease Sister         ablations     Thyroid nodules Sister      Diabetes Type 2  Sister      Asthma Father      Thyroid Disease Sister      Heart Disease Sister          age 55-60?     Skin Cancer Maternal Aunt      Colon Cancer Maternal Aunt      No Known Problems Brother      No Known Problems Brother          Social Hx:  Social History     Socioeconomic History     Marital status:      Spouse name: Not on file     Number of children: 2     Years of education: Not on file     Highest education level: Not on file   Occupational History     Occupation:      Employer: [employer]   Tobacco Use     Smoking status: Never Smoker     Smokeless tobacco: Never Used   Substance and Sexual Activity     Alcohol use: Yes     Drug use: No     Sexual activity: Not Currently     Partners: Male     Birth control/protection: Post-menopausal     Comment: vasectomy   Other Topics Concern     Parent/sibling w/ CABG, MI or angioplasty before 65F 55M? Not Asked   Social History Narrative    She had a child in  and a second in . She had her first grandchild in .      Social Determinants of Health     Financial Resource Strain:      Difficulty of Paying Living Expenses:    Food Insecurity:      Worried About Running Out of Food in the Last Year:      Ran Out of Food in the Last Year:    Transportation Needs:      Lack of Transportation (Medical):      Lack of Transportation (Non-Medical):    Physical Activity:      Days of Exercise per Week:      Minutes of Exercise per Session:    Stress:      Feeling of Stress :    Social Connections:      Frequency of Communication  with Friends and Family:      Frequency of Social Gatherings with Friends and Family:      Attends Shinto Services:      Active Member of Clubs or Organizations:      Attends Club or Organization Meetings:      Marital Status:    Intimate Partner Violence:      Fear of Current or Ex-Partner:      Emotionally Abused:      Physically Abused:      Sexually Abused:           MEDICATIONS:  has a current medication list which includes the following prescription(s): calcium carbonate, fluticasone, multiple vitamins-minerals, vitamin c, alendronate, NO ACTIVE MEDICATIONS, and vitamin d3.    ROS:     ROS: 10 point ROS neg other than the symptoms noted above in the HPI.    GENERAL: mild fatigue, wt stable; denies fevers, chills, night sweats.    HEENT: no dysphagia, odonophagia, diplopia, neck pain  THYROID:  no apparent hyper or hypothyroid symptoms  CV: no chest pain, pressure, palpitations  LUNGS: no SOB, HOLLIDAY, cough, wheezing   ABDOMEN: no diarrhea, constipation, abdominal pain  EXTREMITIES: no rashes, ulcers, edema  NEUROLOGY: no headaches, denies changes in vision, tingling, extremitiy numbness   MSK: occasional back pains; denies muscle weakness  SKIN: no rashes or lesions  : no menses since early 50's  PSYCH:  stable mood, no significant anxiety or depression  ENDOCRINE: no heat or cold intolerance    Physical Exam (visual exam)  VS:  no vital signs taken for video visit  CONSTITUTIONAL: healthy, alert and NAD, well dressed, answering questions appropriately  ENT: no nose swelling or nasal discharge, mouth redness or gum changes.  EYES: eyes grossly normal to inspection, conjunctivae and sclerae normal, no exophthalmos or proptosis  THYROID:  no apparent nodules or goiter  LUNGS: no audible wheeze, cough or visible cyanosis, no visible retractions or increased work of breathing  ABDOMEN: abdomen not evaluated  EXTREMITIES: no hand tremors, limited exam  NEUROLOGY: CN grossly intact, mentation intact and speech  normal   SKIN:  no apparent skin lesions, rash, or edema with visualized skin appearance  PSYCH: mentation appears normal, affect normal/bright, judgement and insight intact,   normal speech and appearance well groomed      LABS:    All pertinent notes, labs, and images personally reviewed by me.     A/P:  Encounter Diagnosis   Name Primary?     Age-related osteoporosis without current pathological fracture Yes     Comments:  Reviewed health history and osteoporosis issues.  Risk factors include low estrogen of menopause, previous (short duration) steroid use for ITP, vit D deficiency  Cause for her late 5/2021 illness unclear, and needs post hospital PCP follow-up evaluation  Reviewed and interpreted tests   Ordered appropriate tests for the endocrinology disease management.    Management options discussed and implemented after shared medical decision making with the patient.  Osteoporosis problem is chronic-stable    Plan:  Discussed general issues with the osteoporosis diagnosis and management  Reviewed concept of using the DEXA scan imaging to assess bone mineral density (BMD)  Discussed terminology of the T-score   We discussed lab tests to assess for secondary causes of bone thinning  Reviewed treatment options with oral bisphosphonate, SERM, IV Boniva vs Reclast, SQ Prolia vs Forteo    Recommend:  Although cause of her late 5/2021 illness unclear, I would not resume the alendronate medication  Advised change to an alternative medication  Consider raloxifene (Evista) 60 mg daily dose   Medication, potential benefits and SE's discussed   She will call me with her decision about   Continue calcium and vitamin D supplement use  Monitor for symptom changes  Avoid heavy lifting and fall injuries  Repeat DEXA scan in 4/2022  I will await her phone call message about the osteoporosis  plan    Reminded her to establish a primary care practitioner (FP or Int Med) for general med appointments, post hospital visit,  repeat lab testing   Addressed patient questions today    There are no Patient Instructions on file for this visit.    Future labs ordered today: No orders of the defined types were placed in this encounter.    Radiology/Consults ordered today: None    Total time spent in with the patient evaluation:  20 min  Additional time spent reviewing pertinent lab tests and chart notes, and documentation:  10 min    Follow-up:  4/2022    BLANCA Jessica MD, MS  Endocrinology  [facility]    CC: JOSÉ Mariscal          Again, thank you for allowing me to participate in the care of your patient.        Sincerely,        Bebo Jessica MD

## 2021-07-13 NOTE — PROGRESS NOTES
Patient is being evaluated via a billable video visit.      How would you like to obtain your AVS? Reviewed verbally  If the video visit is dropped, the invitation should be resent by cell phone  Will anyone else be joining your video visit? no      Video Start Time: 3:30 pm    Video-Visit Details    Type of service:  Video Visit    Video End Time: 3:50 pm    Originating Location (pt. Location): home    Distant Location (provider location):  [facility]/home    Platform used for Video Visit:  [employer]        Recent issues:  Osteoporosis follow-up evaluation  Patient had acute illness and [facility] hospitalization late 5/2021  Febrile illness with GI symptoms and elevated liver tests, cause unclear  Her alendronate med discontinued         1982. History of ITP, then Prednisone medication for ~2 yrs duration  Menopause early 50's, no subsequent HRT  Previous medical evaluations with Dr. Bettina Salgado/[facility]    1/23/17 DEXA:   L1-4    T-score:  -0.4    Left fem neck   T-score: -2.0   Right fem neck  T-score: 12.2    12/2020. GYN evaluation with Dr. Bebo Mariscal/[facility]   1/28/21 DEXA:               Lumbar Spine (L1-L4)       T-score:  -0.4, degenerative changes present               Left Femoral Neck             T-score:  -2.5               Right Femoral Neck           T-score:  -2.5    Health history includes:  Bone fractures:   none  Vit D deficiency:   yes   Kidney stones:   none  Osteoporosis med:   none previously  Supplements:    MVI 1-tab daily       Calcium 600 mg 1-tab daily       Vit C 500 mg 1-tab daily  Previous [facility] labs include:     Lab Test 12/15/20  1534   VITDT 49         4/2/21. Initial osteoporosis evaluation with me at [place]  Reviewed health history and osteoporosis management  Advised starting alendronate medication with weekly dosing  She recalls having some abdominal bloating while on the medication    5/31/21. Hospitalization with cold intolerance, then  febrile illness  Subsequently had COVID-19 test which was negative  Went to [facility] ED and admitted  GI symptoms, elevated liver tests  Decision made to stop alendronate medication, last dose 21  Repeat lab testing after hospitalization, recalls improvement with liver tests    Recent [facility] labs include:  Lab Results   Component Value Date     2021    POTASSIUM 4.1 2021    CHLORIDE 113 (H) 2021    CO2 24 2021    ANIONGAP 2 (L) 2021    GLC 98 2021    BUN 4 (L) 2021    CR 0.55 (L) 2021    GFRESTIMATED >60 2021    GFRESTBLACK >60 2021    ROBERT 7.5 (L) 2021    TSH 2.66 10/22/2018    VITDT 49 12/15/2020         Lives in [place], [place]  Sees Dr. Bebo Mariscal for medicine evaluations.    PMH/PSH:  Past Medical History:   Diagnosis Date     Abdominal pain, epigastric 2021     Age-related osteoporosis without current pathological fracture      Asthma      Asthma      JENNIFER III (cervical intraepithelial neoplasia grade III) with severe dysplasia     Cone biopsy     Hyperlipidemia LDL goal <130      Idiopathic thrombocytopenic purpura (H) 1981     Melanotic stools 2021     Mild intermittent asthma without complication 2021     Past Surgical History:   Procedure Laterality Date     BIOPSY BREAST Right      BREAST BIOPSY, CORE RT/LT      benign     CONIZATION      JENNIFER III     LASER TX, CERVICAL  91    Laser TX Cervical JENNIFER 3 neg margins, no invasion     LUMPECTOMY BREAST      benign     SPLENECTOMY       SPLENECTOMY, TOTAL      ITP     WISDOM ST GUIDEWI         Family Hx:  Family History   Problem Relation Age of Onset     Diabetes Sister      Heart Disease Sister 68        heart attack     Thyroid Disease Sister      Heart Disease Sister          due to heart issues     Heart Disease Sister      Cancer Mother          of cancer possibly from breast at age 55-60.     Breast Cancer Mother      Cancer Father       Cancer - colorectal Maternal Aunt      Heart Disease Sister         ablations     Thyroid nodules Sister      Diabetes Type 2  Sister      Asthma Father      Thyroid Disease Sister      Heart Disease Sister          age 55-60?     Skin Cancer Maternal Aunt      Colon Cancer Maternal Aunt      No Known Problems Brother      No Known Problems Brother          Social Hx:  Social History     Socioeconomic History     Marital status:      Spouse name: Not on file     Number of children: 2     Years of education: Not on file     Highest education level: Not on file   Occupational History     Occupation:      Employer: [employer]   Tobacco Use     Smoking status: Never Smoker     Smokeless tobacco: Never Used   Substance and Sexual Activity     Alcohol use: Yes     Drug use: No     Sexual activity: Not Currently     Partners: Male     Birth control/protection: Post-menopausal     Comment: vasectomy   Other Topics Concern     Parent/sibling w/ CABG, MI or angioplasty before 65F 55M? Not Asked   Social History Narrative    She had a child in  and a second in . She had her first grandchild in .      Social Determinants of Health     Financial Resource Strain:      Difficulty of Paying Living Expenses:    Food Insecurity:      Worried About Running Out of Food in the Last Year:      Ran Out of Food in the Last Year:    Transportation Needs:      Lack of Transportation (Medical):      Lack of Transportation (Non-Medical):    Physical Activity:      Days of Exercise per Week:      Minutes of Exercise per Session:    Stress:      Feeling of Stress :    Social Connections:      Frequency of Communication with Friends and Family:      Frequency of Social Gatherings with Friends and Family:      Attends Protestant Services:      Active Member of Clubs or Organizations:      Attends Club or Organization Meetings:      Marital Status:    Intimate Partner Violence:      Fear of Current  or Ex-Partner:      Emotionally Abused:      Physically Abused:      Sexually Abused:           MEDICATIONS:  has a current medication list which includes the following prescription(s): calcium carbonate, fluticasone, multiple vitamins-minerals, vitamin c, alendronate, NO ACTIVE MEDICATIONS, and vitamin d3.    ROS:     ROS: 10 point ROS neg other than the symptoms noted above in the HPI.    GENERAL: mild fatigue, wt stable; denies fevers, chills, night sweats.    HEENT: no dysphagia, odonophagia, diplopia, neck pain  THYROID:  no apparent hyper or hypothyroid symptoms  CV: no chest pain, pressure, palpitations  LUNGS: no SOB, HOLLIDAY, cough, wheezing   ABDOMEN: no diarrhea, constipation, abdominal pain  EXTREMITIES: no rashes, ulcers, edema  NEUROLOGY: no headaches, denies changes in vision, tingling, extremitiy numbness   MSK: occasional back pains; denies muscle weakness  SKIN: no rashes or lesions  : no menses since early 50's  PSYCH:  stable mood, no significant anxiety or depression  ENDOCRINE: no heat or cold intolerance    Physical Exam (visual exam)  VS:  no vital signs taken for video visit  CONSTITUTIONAL: healthy, alert and NAD, well dressed, answering questions appropriately  ENT: no nose swelling or nasal discharge, mouth redness or gum changes.  EYES: eyes grossly normal to inspection, conjunctivae and sclerae normal, no exophthalmos or proptosis  THYROID:  no apparent nodules or goiter  LUNGS: no audible wheeze, cough or visible cyanosis, no visible retractions or increased work of breathing  ABDOMEN: abdomen not evaluated  EXTREMITIES: no hand tremors, limited exam  NEUROLOGY: CN grossly intact, mentation intact and speech normal   SKIN:  no apparent skin lesions, rash, or edema with visualized skin appearance  PSYCH: mentation appears normal, affect normal/bright, judgement and insight intact,   normal speech and appearance well groomed      LABS:    All pertinent notes, labs, and images personally  reviewed by me.     A/P:  Encounter Diagnosis   Name Primary?     Age-related osteoporosis without current pathological fracture Yes     Comments:  Reviewed health history and osteoporosis issues.  Risk factors include low estrogen of menopause, previous (short duration) steroid use for ITP, vit D deficiency  Cause for her late 5/2021 illness unclear, and needs post hospital PCP follow-up evaluation  Reviewed and interpreted tests   Ordered appropriate tests for the endocrinology disease management.    Management options discussed and implemented after shared medical decision making with the patient.  Osteoporosis problem is chronic-stable    Plan:  Discussed general issues with the osteoporosis diagnosis and management  Reviewed concept of using the DEXA scan imaging to assess bone mineral density (BMD)  Discussed terminology of the T-score   We discussed lab tests to assess for secondary causes of bone thinning  Reviewed treatment options with oral bisphosphonate, SERM, IV Boniva vs Reclast, SQ Prolia vs Forteo    Recommend:  Although cause of her late 5/2021 illness unclear, I would not resume the alendronate medication  Advised change to an alternative medication  Consider raloxifene (Evista) 60 mg daily dose   Medication, potential benefits and SE's discussed   She will call me with her decision about   Continue calcium and vitamin D supplement use  Monitor for symptom changes  Avoid heavy lifting and fall injuries  Repeat DEXA scan in 4/2022  I will await her phone call message about the osteoporosis  plan    Reminded her to establish a primary care practitioner (FP or Int Med) for general med appointments, post hospital visit, repeat lab testing   Addressed patient questions today    There are no Patient Instructions on file for this visit.    Future labs ordered today: No orders of the defined types were placed in this encounter.    Radiology/Consults ordered today: None    Total time spent in with the  patient evaluation:  20 min  Additional time spent reviewing pertinent lab tests and chart notes, and documentation:  10 min    Follow-up:  4/2022    BLANCA Jessica MD, MS  Endocrinology  [facility]    CC: JOSÉ Mariscal

## 2021-08-24 ENCOUNTER — TELEPHONE (OUTPATIENT)
Dept: OBGYN | Facility: CLINIC | Age: 61
End: 2021-08-24

## 2021-08-24 ENCOUNTER — LAB (OUTPATIENT)
Dept: LAB | Facility: CLINIC | Age: 61
End: 2021-08-24
Payer: COMMERCIAL

## 2021-08-24 ENCOUNTER — TELEPHONE (OUTPATIENT)
Dept: ENDOCRINOLOGY | Facility: CLINIC | Age: 61
End: 2021-08-24

## 2021-08-24 DIAGNOSIS — M81.0 AGE-RELATED OSTEOPOROSIS WITHOUT CURRENT PATHOLOGICAL FRACTURE: Primary | ICD-10-CM

## 2021-08-24 DIAGNOSIS — R74.8 ELEVATED LIVER ENZYMES: Primary | ICD-10-CM

## 2021-08-24 DIAGNOSIS — R74.8 ELEVATED LIVER ENZYMES: ICD-10-CM

## 2021-08-24 LAB
ALBUMIN SERPL-MCNC: 4 G/DL (ref 3.5–5)
ALP SERPL-CCNC: 62 U/L (ref 45–120)
ALT SERPL W P-5'-P-CCNC: 20 U/L (ref 0–45)
ANION GAP SERPL CALCULATED.3IONS-SCNC: 11 MMOL/L (ref 5–18)
AST SERPL W P-5'-P-CCNC: 21 U/L (ref 0–40)
BILIRUB SERPL-MCNC: 0.5 MG/DL (ref 0–1)
BUN SERPL-MCNC: 18 MG/DL (ref 8–22)
CALCIUM SERPL-MCNC: 9.8 MG/DL (ref 8.5–10.5)
CHLORIDE BLD-SCNC: 107 MMOL/L (ref 98–107)
CO2 SERPL-SCNC: 25 MMOL/L (ref 22–31)
CREAT SERPL-MCNC: 0.79 MG/DL (ref 0.6–1.1)
GFR SERPL CREATININE-BSD FRML MDRD: 82 ML/MIN/1.73M2
GLUCOSE BLD-MCNC: 109 MG/DL (ref 70–125)
POTASSIUM BLD-SCNC: 4 MMOL/L (ref 3.5–5)
PROT SERPL-MCNC: 7.2 G/DL (ref 6–8)
SODIUM SERPL-SCNC: 143 MMOL/L (ref 136–145)

## 2021-08-24 PROCEDURE — 36415 COLL VENOUS BLD VENIPUNCTURE: CPT

## 2021-08-24 PROCEDURE — 80053 COMPREHEN METABOLIC PANEL: CPT

## 2021-08-24 NOTE — TELEPHONE ENCOUNTER
I spoke with the pt today regarding her visit with Dr Jessica, her recent hospitalizations with elevated liver enzymes, her need for a PCP and the recommendation of Dr Jessica to consider starting Evista as Rx for her osteoporosis.  There was a question as to whether or not Dr Jessica was in network and that issue has been resolved and he is indeed in network.  She is expecting a return phone call later to day regarding starting Evista.  I have placed an order for a f/u CMP and she will stop be the lab to have this drawn.  I will f/u with her on the results when they are available  Bebo Mariscal M.D.

## 2021-08-24 NOTE — TELEPHONE ENCOUNTER
Spoke with patient and worked with her to find out if MD is in network for her [employer]. DR. Hillman after a lot of research is still within network . .Erika Stoddard RN  Pt did not start Evista as she was not sure if she would have to change Endocrinologist due to insurance .     Pt would be agreeable now to trying Evista. Please send prescription..Erika Stoddard RN

## 2021-08-24 NOTE — TELEPHONE ENCOUNTER
Pt calling.   She is needing to get established with a PCP.    She is wondering if you would be willing to be her PCP?  She has an endocrinologist who advised her to see a PCP.     I did state that you are an OB/GYN and if you did agree to being her PCP, that there may be times when you are not available and your partners do not do primary care.    Please advise.    Yesenia KONG RN

## 2021-08-25 RX ORDER — RALOXIFENE HYDROCHLORIDE 60 MG/1
60 TABLET, FILM COATED ORAL DAILY
Qty: 30 TABLET | Refills: 11 | Status: SHIPPED | OUTPATIENT
Start: 2021-08-25 | End: 2021-09-22

## 2021-08-25 NOTE — RESULT ENCOUNTER NOTE
Crys, your comprehensive metabolic panel (liver enzymes) results are within normal limits  Bebo Mariscal M.D.

## 2021-08-25 NOTE — TELEPHONE ENCOUNTER
Messages noted.  I agree with (my previous recommendation for) Evista medication use, new Rx sent to her pharmacy.    BLANCA Jessica MD, MS  Endocrinology  [facility]

## 2021-08-26 ENCOUNTER — TELEPHONE (OUTPATIENT)
Dept: ENDOCRINOLOGY | Facility: CLINIC | Age: 61
End: 2021-08-26

## 2021-08-26 NOTE — TELEPHONE ENCOUNTER
Called pt and discussed side effects of medication . No further questions at this time she will call if she starts to experience any bloating or other symptoms.Erika Stoddard RN

## 2021-08-26 NOTE — TELEPHONE ENCOUNTER
[facility] Call Center    Phone Message    May a detailed message be left on voicemail: yes     Reason for Call: Other: pt is calling Erika back, please call her back.     Action Taken: Message routed to:  [facility] Endo    Travel Screening: Not Applicable

## 2021-08-26 NOTE — TELEPHONE ENCOUNTER
[facility] Call Center    Phone Message    May a detailed message be left on voicemail: yes     Reason for Call: Medication Question or concern regarding medication   Prescription Clarification  Name of Medication: raloxifene (EVISTA) 60 MG tablet     Prescribing Provider: Dr. Jessica   Pharmacy: [facility]   What on the order needs clarification? Pt requesting call back to discuss this medication. Pt is wanting to know what side effects she should be possibly looking out for etc          Action Taken: Message routed to:  [facility] candace

## 2021-08-26 NOTE — TELEPHONE ENCOUNTER
CALLED pt TO DISCUSS THE BELOW SIDE EFFECTS OF EVISTA: LEFT MESSAGE TO CALL BACK OR SPEAK WITH PHARMACIST .    EVISTA Side Effects by Likelihood and Severity  COMMON side effects  If experienced, these tend to have a Severe expression i  An Infection  Urinary Tract Infection  Chest Pain  If experienced, these tend to have a Less Severe expression i  Inflammation Of The Tissue Lining The Sinuses  Inflammation Or Infection Of The Vagina  Drug-Induced Hot Flashes  INFREQUENT side effects  If experienced, these tend to have a Severe expression i  A Migraine Headache  Pneumonia  Inflammation Of The Lining Of The Stomach And Intestines  Gallstones  If experienced, these tend to have a Less Severe expression i  Depression  Laryngitis  Irritation Of The Stomach Or Intestines  Vaginal Discharge  Arthritis  Joint Pain  Muscle Pain  Difficulty Sleeping  Excessive Sweating  Weight Gain    RARE side effects  If experienced, these tend to have a Severe expression i  A Clot In The Small Veins That Carry Blood To Or From The Retina Of The Eye  A Clot In The Lung  A Stroke  Obstruction Of A Blood Vessel By A Blood Clot  A Blood Clot  Blood Clot In A Deep Vein Of The Extremities  If experienced, these tend to have a Less Severe expression i  Inflammation Of A Vein

## 2021-09-22 ENCOUNTER — TELEPHONE (OUTPATIENT)
Dept: ENDOCRINOLOGY | Facility: CLINIC | Age: 61
End: 2021-09-22

## 2021-09-22 DIAGNOSIS — M81.0 AGE-RELATED OSTEOPOROSIS WITHOUT CURRENT PATHOLOGICAL FRACTURE: ICD-10-CM

## 2021-09-22 RX ORDER — RALOXIFENE HYDROCHLORIDE 60 MG/1
60 TABLET, FILM COATED ORAL DAILY
Qty: 90 TABLET | Refills: 3 | Status: SHIPPED | OUTPATIENT
Start: 2021-09-22 | End: 2022-04-12

## 2021-09-22 NOTE — TELEPHONE ENCOUNTER
[facility] Call Center    Phone Message    May a detailed message be left on voicemail: yes     Reason for Call: Medication Question or concern regarding medication   Prescription Clarification  Name of Medication: raloxifene (EVISTA) 60 MG tablet  Prescribing Provider: Jaskaran   Pharmacy: [id number] [address] [facility] [address] [facility] [place].   What on the order needs clarification? Pt would like to know if she can receive this medication as a 90 day supply.  Please review and send new Rx if possible- Pharmacy will not allow more than 30 day supply at a time without new Rx.       Action Taken: Message routed to:  Other: endo    Travel Screening: Not Applicable

## 2021-09-26 ENCOUNTER — HEALTH MAINTENANCE LETTER (OUTPATIENT)
Age: 61
End: 2021-09-26

## 2021-10-07 ENCOUNTER — TELEPHONE (OUTPATIENT)
Dept: OBGYN | Facility: CLINIC | Age: 61
End: 2021-10-07

## 2021-10-07 NOTE — TELEPHONE ENCOUNTER
Patient calling:  Patients thumb and right finger turn white and numb intermittently.  Started when she was in the hospital 5/31/21 after a bad blood draw. (very painful during draw)  Has numbness area of blood draw always present but  Pain subsided after 1 1/2 months.  Unable to straighten right arm on own since that time also.    She would like your advised on where she should go to have this evaluated.  Cristiane Kim RN

## 2021-10-12 NOTE — TELEPHONE ENCOUNTER
I left the patient a voicemail message to return my call regarding her hand and arm pain.  I recommended that she see a hand specialist.  [facility] is a specialty orthopedics group in the [facility] that has hand specialist that would be of help.  In the past there was an individual by the name of Dr. Misha Crawford who is a hand specialist who would be able to see her for this.  I have asked her to contact their office to make an appointment for evaluation.  She will call if she has concerns in the interval  Bebo Mariscal MD FACOG

## 2021-10-28 ENCOUNTER — TRANSFERRED RECORDS (OUTPATIENT)
Dept: HEALTH INFORMATION MANAGEMENT | Facility: CLINIC | Age: 61
End: 2021-10-28
Payer: COMMERCIAL

## 2021-11-09 ENCOUNTER — TRANSFERRED RECORDS (OUTPATIENT)
Dept: HEALTH INFORMATION MANAGEMENT | Facility: CLINIC | Age: 61
End: 2021-11-09
Payer: COMMERCIAL

## 2021-11-15 ENCOUNTER — OFFICE VISIT (OUTPATIENT)
Dept: OBGYN | Facility: CLINIC | Age: 61
End: 2021-11-15
Payer: COMMERCIAL

## 2021-11-15 ENCOUNTER — HOSPITAL ENCOUNTER (OUTPATIENT)
Dept: MAMMOGRAPHY | Facility: CLINIC | Age: 61
Discharge: HOME | End: 2021-11-15
Attending: OBSTETRICS & GYNECOLOGY | Admitting: OBSTETRICS & GYNECOLOGY
Payer: COMMERCIAL

## 2021-11-15 VITALS — DIASTOLIC BLOOD PRESSURE: 78 MMHG | BODY MASS INDEX: 21.85 KG/M2 | WEIGHT: 127.3 LBS | SYSTOLIC BLOOD PRESSURE: 140 MMHG

## 2021-11-15 DIAGNOSIS — A69.20 LYME DISEASE: Primary | ICD-10-CM

## 2021-11-15 DIAGNOSIS — M81.0 AGE-RELATED OSTEOPOROSIS WITHOUT CURRENT PATHOLOGICAL FRACTURE: ICD-10-CM

## 2021-11-15 DIAGNOSIS — Z12.31 VISIT FOR SCREENING MAMMOGRAM: ICD-10-CM

## 2021-11-15 DIAGNOSIS — D75.1 POLYCYTHEMIA: ICD-10-CM

## 2021-11-15 DIAGNOSIS — Z01.419 ENCOUNTER FOR GYNECOLOGICAL EXAMINATION WITHOUT ABNORMAL FINDING: ICD-10-CM

## 2021-11-15 LAB
B BURGDOR IGG+IGM SER QL: 0.86
BASOPHILS # BLD AUTO: 0 10E3/UL (ref 0–0.2)
BASOPHILS NFR BLD AUTO: 0 %
EOSINOPHIL # BLD AUTO: 1.2 10E3/UL (ref 0–0.7)
EOSINOPHIL NFR BLD AUTO: 10 %
ERYTHROCYTE [DISTWIDTH] IN BLOOD BY AUTOMATED COUNT: 15.4 % (ref 10–15)
HCT VFR BLD AUTO: 42 % (ref 35–47)
HGB BLD-MCNC: 13.7 G/DL (ref 11.7–15.7)
LYMPHOCYTES # BLD AUTO: 3.6 10E3/UL (ref 0.8–5.3)
LYMPHOCYTES NFR BLD AUTO: 29 %
MCH RBC QN AUTO: 29.3 PG (ref 26.5–33)
MCHC RBC AUTO-ENTMCNC: 32.6 G/DL (ref 31.5–36.5)
MCV RBC AUTO: 90 FL (ref 78–100)
MONOCYTES # BLD AUTO: 1.2 10E3/UL (ref 0–1.3)
MONOCYTES NFR BLD AUTO: 10 %
NEUTROPHILS # BLD AUTO: 6.2 10E3/UL (ref 1.6–8.3)
NEUTROPHILS NFR BLD AUTO: 51 %
PLATELET # BLD AUTO: 423 10E3/UL (ref 150–450)
PTH-INTACT SERPL-MCNC: 79 PG/ML (ref 18–80)
RBC # BLD AUTO: 4.68 10E6/UL (ref 3.8–5.2)
WBC # BLD AUTO: 12.3 10E3/UL (ref 4–11)

## 2021-11-15 PROCEDURE — 99213 OFFICE O/P EST LOW 20 MIN: CPT | Mod: 25 | Performed by: OBSTETRICS & GYNECOLOGY

## 2021-11-15 PROCEDURE — 85025 COMPLETE CBC W/AUTO DIFF WBC: CPT | Performed by: OBSTETRICS & GYNECOLOGY

## 2021-11-15 PROCEDURE — 86618 LYME DISEASE ANTIBODY: CPT | Performed by: OBSTETRICS & GYNECOLOGY

## 2021-11-15 PROCEDURE — 82306 VITAMIN D 25 HYDROXY: CPT | Performed by: OBSTETRICS & GYNECOLOGY

## 2021-11-15 PROCEDURE — 84443 ASSAY THYROID STIM HORMONE: CPT | Performed by: OBSTETRICS & GYNECOLOGY

## 2021-11-15 PROCEDURE — 36415 COLL VENOUS BLD VENIPUNCTURE: CPT | Performed by: OBSTETRICS & GYNECOLOGY

## 2021-11-15 PROCEDURE — 83970 ASSAY OF PARATHORMONE: CPT | Performed by: OBSTETRICS & GYNECOLOGY

## 2021-11-15 PROCEDURE — 77063 BREAST TOMOSYNTHESIS BI: CPT

## 2021-11-15 PROCEDURE — 99396 PREV VISIT EST AGE 40-64: CPT | Performed by: OBSTETRICS & GYNECOLOGY

## 2021-11-15 PROCEDURE — 82310 ASSAY OF CALCIUM: CPT | Performed by: OBSTETRICS & GYNECOLOGY

## 2021-11-15 RX ORDER — ALBUTEROL SULFATE 90 UG/1
2 AEROSOL, METERED RESPIRATORY (INHALATION) EVERY 6 HOURS
COMMUNITY
End: 2022-11-15

## 2021-11-15 NOTE — PATIENT INSTRUCTIONS
"You can reach your [facility] Care Team any time of the day by calling [phone]. This number will put you in touch with the 24 hour nurse line if the clinic is closed.    To contact your OB/GYN Station Coordinator/Surgery Scheduler please call [phone]. This is a direct number for your care team between 8 a.m. and 4 p.m. Monday through Friday.    [facility] is open for your convenience:  Monday through Friday 8 a.m. to 6 p.m.  Closed weekends and all major holidays.  What lifestyle changes can I make to help improve my cholesterol levels?    Exercise regularly.  Exercise can raise HDL cholesterol levels and reduce levels of LDL cholesterol and triglycerides. If you haven't been exercising, try to work up to 30 minutes, 4 to 6 times a week.    Lose weight if you are overweight.  Being overweight can raise your cholesterol levels. Losing weight, even just 5 or 10 pounds, can lower your total cholesterol, LDL cholesterol and triglyceride levels.    Eat a heart-healthy diet.  Eat plenty of fresh fruits and vegetables. Fruits and vegetables are naturally low in fat. Not only do they add flavor and variety to your diet, but they are also the best source of fiber, vitamins and minerals for your body. Aim for 5 cups of fruits and vegetables every day, not counting potatoes, corn and rice. Potatoes, corn and rice count as carbohydrates.     Pick \"good\" fats over \"bad\" fats. Fat is part of a healthy diet, but you need to know the difference between \"bad\" fats and \"good\" fats. \"Bad\" fats, such as saturated and trans fats, are found in foods such as butter; coconut and palm oil; saturated or partially hydrogenated vegetable fats such as shortening and margarine; animal fats in meats; and fats in whole milk dairy products. Limit the amount of saturated fat in your diet, and avoid trans fat completely. Unsaturated fat is the \"good\" fat. Most fats in fish, vegetables, grains and tree nuts are unsaturated. Try to eat " "unsaturated fat in place of saturated fat. For example, you can use olive oil or canola oil in cooking instead of butter.     Use healthier cooking methods. Baking, broiling and roasting are the healthiest ways to prepare meat, poultry and other foods. Trim any outside fat or skin before cooking. Lean cuts can be pan-broiled or stir-fried. Use either a nonstick pan or nonstick cooking spray instead of adding fats such as butter or margarine. When eating out, ask how food is prepared. You can request that your food be baked, broiled or roasted, rather than fried.   Look for other sources of protein. Fish, dry beans, tree nuts, peas and lentils offer protein, nutrients and fiber without the cholesterol and saturated fats that meats have. Consider eating one \"meatless\" meal each week. Try substituting beans for meat in a favorite recipe, such as lasagna or chili. Snack on a handful of almonds or pecans. Soy is also an excellent source of protein. Good examples of soy include soy milk, edamame (green soy beans), tofu and soy protein shakes.     Get more fiber in your diet. Add good sources of fiber to your meals. Examples include fruits, vegetables, whole grains (such as oat bran, whole and rolled oats and barley), legumes (such as beans and peas) and nuts and seeds (such as ground flax seed). In addition to fiber, whole grains supply B-vitamins and important nutrients not found in foods made with white flour.     Eat more fish. Fish are an excellent source of omega-3 fatty acids. Wild-caught oily fish, such as salmon, tuna, mackerel and sardines, are the best sources of omega-3s, but all fish contain some amount of this beneficial fatty acid. Aim for 2 6-oz servings each week.     "

## 2021-11-16 LAB
CALCIUM SERPL-MCNC: 9.3 MG/DL (ref 8.5–10.1)
DEPRECATED CALCIDIOL+CALCIFEROL SERPL-MC: 37 UG/L (ref 20–75)
TSH SERPL DL<=0.005 MIU/L-ACNC: 2.91 MU/L (ref 0.4–4)

## 2021-11-16 NOTE — RESULT ENCOUNTER NOTE
Crys, all your results are within acceptable limits.  Your test for Lyme's disease is also negative.  Please let me know if you have any additional questions  Thank you  Bebo Mariscal M.D.

## 2021-11-16 NOTE — PROGRESS NOTES
HPI:  Crys Quarles is a 60 year old white female  ,vasectomy and menopause for contraception who presents for an annual exam and pap.  She is undergoing evaluation for numbness and weakness in her right upper extremity.  She feels these symptoms have been present since the blood draw earlier this summer when she was hospitalized.  The patient had a normal mammogram today.  She had a fasting lipid panel done 2018 and I reviewed those results with her.  I did recommend a low-fat low-cholesterol diet and gave her an outline for this.  The patient saw endocrinology for her osteoporosis.  States that she had a tick bite on her left breast at the 12 o'clock position and is concerned about the possibility of Lyme's disease.  She like titers drawn today.  She had a colonoscopy in  and thinks that she is due in 5 years.  The patient will contact [facility] to evaluate this.  Self breast exam,  ACS screening mammogram recs, the use of 81 mg ASA to decrease the risk of heart disease, lipid screening, colon cancer screening recs and Dexa scan recs thoroughly reveiwed.      Past Medical History:   Diagnosis Date     Abdominal pain, epigastric 2021     Age-related osteoporosis without current pathological fracture      Asthma      Asthma      JENNIFER III (cervical intraepithelial neoplasia grade III) with severe dysplasia     [facility] biopsy     Hyperlipidemia LDL goal <130      Idiopathic thrombocytopenic purpura (H)      Melanotic stools 2021     Mild intermittent asthma without complication 2021     Past Surgical History:   Procedure Laterality Date     BIOPSY BREAST Right      BREAST BIOPSY, CORE RT/LT      benign     CONIZATION      JENNIFER III     LASER TX, CERVICAL  91    Laser TX Cervical JENNIFER 3 neg margins, no invasion     LUMPECTOMY BREAST      benign     SPLENECTOMY       SPLENECTOMY, TOTAL      ITP     WISDOM [facility]       Family History    Problem Relation Age of Onset     Cancer Mother          of cancer possibly from breast at age 55-60.     Breast Cancer Mother      Cancer Father      Asthma Father      Diabetes Sister      Heart Disease Sister 68        heart attack     Thyroid Disease Sister      Heart Disease Sister          due to heart issues     Heart Disease Sister      Heart Disease Sister         ablations     Thyroid nodules Sister      Diabetes Type 2  Sister      Heart Disease Sister          age 55-60?     No Known Problems Brother      No Known Problems Brother      Skin Cancer Maternal Aunt      Colon Cancer Maternal Aunt      Social History     Socioeconomic History     Marital status:      Spouse name: Not on file     Number of children: 2     Years of education: Not on file     Highest education level: Not on file   Occupational History     Occupation: infusion specialist     Comment: [employer]   Tobacco Use     Smoking status: Never Smoker     Smokeless tobacco: Never Used   Vaping Use     Vaping Use: Never used   Substance and Sexual Activity     Alcohol use: Yes     Drug use: No     Sexual activity: Not Currently     Partners: Male     Birth control/protection: Post-menopausal     Comment: vasectomy   Other Topics Concern     Parent/sibling w/ CABG, MI or angioplasty before 65F 55M? Not Asked   Social History Narrative    She had a child in  and a second in . She had her first grandchild in .      Social Determinants of Health     Financial Resource Strain: Not on file   Food Insecurity: Not on file   Transportation Needs: Not on file   Physical Activity: Not on file   Stress: Not on file   Social Connections: Not on file   Intimate Partner Violence: Not on file   Housing Stability: Not on file       Allergies:  Other allergy (see comments) [external allergen needs reconciliation - see comment]    Current Outpatient Medications   Medication Sig Dispense Refill     albuterol (PROAIR  HFA/PROVENTIL HFA/VENTOLIN HFA) 108 (90 Base) MCG/ACT inhaler Inhale 2 puffs into the lungs every 6 hours       calcium carbonate (OS-ROBERT) 600 MG tablet Take by mouth daily        fluticasone (FLOVENT HFA) 110 MCG/ACT inhaler Inhale 1 puff into the lungs 2 times daily       Multiple Vitamins-Minerals (MULTIVITAMIN ADULTS PO)        raloxifene (EVISTA) 60 MG tablet Take 1 tablet (60 mg) by mouth daily 90 tablet 3     vitamin C (ASCORBIC ACID) 250 MG tablet Take 250 mg by mouth daily         ROS: ROS: 10 point ROS neg other than the symptoms noted above in the HPI.    EXAM:  Vitals: BP (!) 140/78   Wt 57.7 kg (127 lb 4.8 oz)   LMP 03/06/2008   BMI 21.85 kg/m    BMI= Body mass index is 21.85 kg/m .  Constitutional: healthy, alert and no distress  Head: Normocephalic. No masses, lesions, tenderness or abnormalities  Neck: Neck supple. No adenopathy. Thyroid symmetric, normal size,, Carotids without bruits.  ENT: NEGATIVE for ear, mouth and throat problems  Breast:  breasts symmetric, no dominant or suspicious mass, no skin or nipple changes, no axillary adenopathy, unchanged from previous exam, self exam in taught and encouraged , I did see some local pinpoint skin irritation at the 12 o'clock position of the left breast where the tick bite was.  I did not see any evidence of residual tick or any typical target rash suggestive of Lyme's.  She denies other arthralgias or typical symptoms  Cardiovascular: negative, PMI normal. No lifts, heaves, or thrills. RRR. No murmurs, clicks gallops or rub  Respiratory: negative, Percussion normal. Good diaphragmatic excursion. Lungs clear  Gastrointestinal: Abdomen soft, non-tender. BS normal. No masses, organomegaly  Genitourinary: Pelvic Exam:  External Genitalia:     Normal appearance for age, no discharge present, no tenderness present, no inflammatory lesions present, color normal  Vagina:     Normal vaginal vault without central or paravaginal defects, no discharge present,  no inflammatory lesions present, no masses present  Bladder:     Nontender to palpation  Urethra:   Urethral Body:  Urethra palpation normal, urethra structural support normal   Urethral Meatus:  No erythema or lesions present  Cervix:     Appearance healthy, no lesions present, nontender to palpation, no bleeding present  Uterus:     Uterus: firm, normal sized and nontender, midplane in position.   Adnexa:     No adnexal tenderness present, no adnexal masses present  Perineum:     Perineum within normal limits, no evidence of trauma, no rashes or skin lesions present  Anus:     Anus within normal limits, no hemorrhoids present  Inguinal Lymph Nodes:     No lymphadenopathy present  Pubic Hair:     Normal pubic hair distribution for age  Genitalia and Groin:     No rashes present, no lesions present, no areas of discoloration, no masses present    Musculoskeletalextremities normal- no gross deformities noted, gait normal and normal muscle tone  Integument: no suspicious lesions or rashes  Neurologic: Gait normal. Reflexes normal and symmetric. Sensation grossly WNL.  Psychiatric: mentation appears normal and affect normal/bright  Hematologic/Lymphatic/Immunologic: Normal cervical lymph nodes     ASSESSMENT:/PLAN:  (A69.20) Lyme disease  (primary encounter diagnosis)  Comment: I will get Lyme's titer today with appropriate therapy to follow  Plan: Lyme Disease Debora with reflex to WB Serum        Ordered    (Z01.419) Encounter for gynecological examination without abnormal finding  Comment: Unremarkable GYN exam other than above health related concerns  Plan: Return 1 year as needed concerns arise    (D75.1) Polycythemia, history of thrombocytosis  Comment: I reviewed her previous laboratory data from previous hospitalization May 2021 and on June 24, 2021 showing an elevated platelet count and her history of ITP  Plan: CBC with platelets and differential        We will recheck with appropriate therapy to follow.  I also  reviewed her previous parathyroid hormone comprehensive metabolic panel and laboratory data from June 2021.  We will get appropriate referral as indicated    (M81.0) Age-related osteoporosis without current pathological fracture  Comment: Notes of Dr. Jessica from 7/13/2021 reviewed  Plan: Plan to repeat DEXA scan in April 2022.  The patient will continue with calcium and vitamin D supplementation and Krystal Mariscal M.D.

## 2021-11-23 ENCOUNTER — TRANSFERRED RECORDS (OUTPATIENT)
Dept: HEALTH INFORMATION MANAGEMENT | Facility: CLINIC | Age: 61
End: 2021-11-23

## 2021-12-07 ENCOUNTER — TRANSFERRED RECORDS (OUTPATIENT)
Dept: HEALTH INFORMATION MANAGEMENT | Facility: CLINIC | Age: 61
End: 2021-12-07
Payer: COMMERCIAL

## 2021-12-21 ENCOUNTER — TELEPHONE (OUTPATIENT)
Dept: OBGYN | Facility: CLINIC | Age: 61
End: 2021-12-21
Payer: COMMERCIAL

## 2021-12-21 NOTE — TELEPHONE ENCOUNTER
Pt calling.   She is needing to start on an anticoagulant due to a blood clot in her wrist.    Dr. Day (sp.?) is the person that advised the pt to call you and see if you would be willing to rx something.    Yesenia KONG RN

## 2021-12-21 NOTE — TELEPHONE ENCOUNTER
I spoke to Dr. Misha Crawford a  hand specialist in the [facility] area from [facility] who has been seeing the patient for evaluation of numbness and weakness in her right hand and arm since having an IV placed in that arm last summer.  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 Crawford feels its best that the patient be treated with anticoagulation.  The patient states that Eliquis was covered by her insurance and because of insurance coverage she like to start this before the end of the year.  I will look to find an internist to see this patient ASAP to get her on therapy.  Bebo Mariscal MD FACOG

## 2021-12-22 NOTE — TELEPHONE ENCOUNTER
Would suggest patient obtain and schedule an appointment.  She could see me or anybody with the potential available slot.  Will need copy of imaging studies as I cannot locate as such.  Also radial artery occlusion relatively unusual considering duration of patient's symptoms.  Would need to discuss with patient's orthopedist how long they would be interested in anticoagulation therapy as well as follow-up.  Orthopedics could treat if they feel indicated?

## 2021-12-23 ENCOUNTER — TELEPHONE (OUTPATIENT)
Dept: OBGYN | Facility: CLINIC | Age: 61
End: 2021-12-23
Payer: COMMERCIAL

## 2021-12-23 NOTE — PROGRESS NOTES
Assessment & Plan     Clot, radial artery.  Presumed clot formation due to venipuncture and/or vascular trauma post hospitalization.  This patient has had this clot formation for probably quite some time as the ultrasound report that the patient reviewed with me on her phone demonstrated some mild recanalization.  She certainly has not had any worsening of her symptoms and minimal prior history.  At this point it was a recommendation of her orthopedist that we start anticoagulation which I feel at this point is appropriate but the duration of treatment and need for as well as further evaluation is unclear thus I have suggested she see our vascular medicine specialist for further assessment.  She was advised of the risks of anticoagulants as well as the benefits.  We discussed the use of other anticoagulants or inflammatories and the increased risk of bleeding.  This has been ongoing for 6 months therefore I am just going to start her on 5 mg twice a day.    I will ask our vascular specialist to help determine if ongoing treatment is needed and if so which duration would be best and subsequently if any further imaging studies would be of benefit.  The patient does not demonstrate any distinct functional abnormalities and has a palpable radial artery.  - apixaban ANTICOAGULANT (ELIQUIS) 5 MG tablet; Take 1 tablet (5 mg) by mouth 2 times daily  - Vascular Medicine Referral; Future           See Patient Instructions    No follow-ups on file.    Eliud Joiner MD  [facility]   Crys is a 61 year old who presents for the following health issues  accompanied by her self.    HPI     Patient referred by Dr. Mariscal.    Please note I have received no information from the patient's orthopedist.  I was also not able to locate anything in care everywhere.  I was only able to view an ultrasound report that is on the patient's iPhone.    Dr. Misha Crawford a hand specialist in the  [facility] from [facility] who has been seeing the patient for evaluation of numbness and weakness in her right hand and arm since having an IV placed in that arm last summer.  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 Crawford feels its best that the patient be treated with anticoagulation.     Had ultrasound done 11/23/21 which revealed a distal radial artery occlusion proximal to the trifurcation at the first web.  There was some mild recanalization noted.    In talking to the patient she states that she had a hospitalization in June 2021 for elevated liver function tests.  She underwent numerous lab tests and IV placements in her right arm during this hospitalization and her liver function test improved prior to discharge with no obvious cause or source.  Shortly after this she developed numbness tingling and a cool sensation in her right upper distal extremity.  She contacted her obstetrician and gynecologist who then referred her to the above physician for assessment.  Patient underwent an evaluation in November which included an EMG and a routine assessment followed by a right upper extremity ultrasound which demonstrated the above findings.    Per the patient's orthopedist it was recommended that the patient be anticoagulated that the patient contacted again Dr. Mariscal who then referred the patient to me.    Patient reports that at times her right upper extremity distally feels slightly bruised or heavy.  She does not report significant paresthesias.  There is no profound weakness.  She has not had a prior history of clots.  She does report her brother apparently having a femoral clot of some type but it is unclear if this is arterial or venous.    Review of Systems :    CONSTITUTIONAL: NEGATIVE for fever, chills, change in weight  EYES: NEGATIVE for vision changes or irritation  ENT/MOUTH: NEGATIVE for ear, mouth and  "throat problems  RESP: NEGATIVE for significant cough or SOB  CV: NEGATIVE for chest pain, palpitations or peripheral edema  GI: NEGATIVE for nausea, abdominal pain, heartburn, or change in bowel habits  : NEGATIVE for frequency, dysuria, or hematuria  MUSCULOSKELETAL: NEGATIVE for significant arthralgias or myalgia  NEURO: NEGATIVE for weakness, dizziness or paresthesias  HEME: NEGATIVE for bleeding problems  PSYCHIATRIC: NEGATIVE for changes in mood or affect      Objective    /70   Pulse 69   Temp 98  F (36.7  C) (Temporal)   Resp 16   Ht 1.626 m (5' 4\")   Wt 57.6 kg (127 lb)   LMP 03/06/2008   SpO2 95%   BMI 21.80 kg/m    Body mass index is 21.8 kg/m .     Physical Exam   GENERAL: alert and no distress  EYES: Eyes grossly normal to inspection, PERRL and conjunctivae and sclerae normal  HENT: ear canals and TM's normal, nose and mouth without ulcers or lesions  NECK: no adenopathy, no asymmetry, masses, or scars and thyroid normal to palpation  RESP: lungs clear to auscultation - no rales, rhonchi or wheezes  CV: regular rate and rhythm, normal S1 S2, no S3 or S4, no click or rub  ABDOMEN: soft, nontender, no hepatosplenomegaly, no masses and bowel sounds normal  Right upper extremity demonstrates no edema.  There is full range of motion.  There is good  strength.  The capillary refill is intact.  The radial artery is palpable.  No focal tenderness is noted.  NEURO: No focal changes  PSYCH: mentation appears normal, affect normal/bright    Creatinine   Date Value Ref Range Status   08/24/2021 0.79 0.60 - 1.10 mg/dL Final             "

## 2021-12-23 NOTE — TELEPHONE ENCOUNTER
I spoke with the patient today regarding the response that I got from Dr. Joiner regarding anticoagulation therapy for thrombosed branch of the right radial artery which was diagnosed by Dr. Misha Crawford an orthopedic hand specialist through [facility].  It was Dr. Crawford's recommendation that the patient be treated with anticoagulation for 3 to 6 months in hopes that the radial artery would recannulate.  It is not entirely clear to me why Dr. Crawford elected to not personally manage the situation.  The patient gave Dr. Crawford my name as her primary care physician even though I do not personally manage thromboembolic disease.  I have asked the patient to talk to Dr. Joiner regarding beginning Eliquis.  I have also asked that she hand-delivered a copy of the medical records from Dr. Crawford's office and either give them to Dr. Joiner prior to the appointment or at the time of the appointment such that questions that he has regarding her evaluation or work-up could be addressed.  The patient states that she will do this and call to get an appointment  Thank you

## 2021-12-24 NOTE — TELEPHONE ENCOUNTER
I spoke with the pt.     Pt states that she spoke with Dr. Mariscal yesterday, and has an appt with an internist on Monday.    Yesenia KONG RN

## 2021-12-27 ENCOUNTER — TELEPHONE (OUTPATIENT)
Dept: OTHER | Facility: CLINIC | Age: 61
End: 2021-12-27

## 2021-12-27 ENCOUNTER — OFFICE VISIT (OUTPATIENT)
Dept: INTERNAL MEDICINE | Facility: CLINIC | Age: 61
End: 2021-12-27
Payer: COMMERCIAL

## 2021-12-27 ENCOUNTER — TRANSFERRED RECORDS (OUTPATIENT)
Dept: HEALTH INFORMATION MANAGEMENT | Facility: CLINIC | Age: 61
End: 2021-12-27

## 2021-12-27 VITALS
TEMPERATURE: 98 F | HEIGHT: 64 IN | DIASTOLIC BLOOD PRESSURE: 70 MMHG | WEIGHT: 126.3 LBS | SYSTOLIC BLOOD PRESSURE: 108 MMHG | OXYGEN SATURATION: 95 % | BODY MASS INDEX: 21.56 KG/M2 | RESPIRATION RATE: 16 BRPM | HEART RATE: 69 BPM

## 2021-12-27 DIAGNOSIS — Z23 NEED FOR PNEUMOCOCCAL VACCINATION: ICD-10-CM

## 2021-12-27 DIAGNOSIS — I82.90 CLOT: Primary | ICD-10-CM

## 2021-12-27 PROCEDURE — 90732 PPSV23 VACC 2 YRS+ SUBQ/IM: CPT | Performed by: INTERNAL MEDICINE

## 2021-12-27 PROCEDURE — 99214 OFFICE O/P EST MOD 30 MIN: CPT | Mod: 25 | Performed by: INTERNAL MEDICINE

## 2021-12-27 PROCEDURE — 90471 IMMUNIZATION ADMIN: CPT | Performed by: INTERNAL MEDICINE

## 2021-12-27 ASSESSMENT — MIFFLIN-ST. JEOR: SCORE: 1122.89

## 2021-12-27 NOTE — TELEPHONE ENCOUNTER
Pt referred to [facility] by Eliud Joiner MD for radial artery clot.     Per Dr. Joiner's notes patient completed RUE ultrasound but I am not juli to locate the report in Epic, media or Care everywhere   I called patient and sent [employer] message asking where ultrasound was completed and we need the report. Fax  Number provided. Awaiting response prior to scheduling.      Pt needs to be scheduled for consult with vascular medicine.  Will route to scheduling to coordinate an appointment at next available.    Alyx CHEN, RN    [facility]  Office: [phone]  Fax: [phone]

## 2021-12-28 ASSESSMENT — ASTHMA QUESTIONNAIRES: ACT_TOTALSCORE: 15

## 2021-12-29 NOTE — TELEPHONE ENCOUNTER
I called patient again and she states she is already scheduled with Dr. Ceja and was scheduled by Lindsey. However, at the time of scheduling it was not asked where patient completed imaging to ensure we have the US report prior to consult as mentioned in my notes below. Patient states she completed imaging at [facility] and will call them to have the report faxed.    Alyx CISNEROSN, RN    [facility]  Office: [phone]  Fax: [phone]

## 2022-01-13 ENCOUNTER — OFFICE VISIT (OUTPATIENT)
Dept: OTHER | Facility: CLINIC | Age: 62
End: 2022-01-13
Attending: INTERNAL MEDICINE
Payer: COMMERCIAL

## 2022-01-13 VITALS
HEIGHT: 64 IN | BODY MASS INDEX: 22.02 KG/M2 | SYSTOLIC BLOOD PRESSURE: 130 MMHG | OXYGEN SATURATION: 97 % | WEIGHT: 129 LBS | HEART RATE: 73 BPM | RESPIRATION RATE: 16 BRPM | DIASTOLIC BLOOD PRESSURE: 77 MMHG

## 2022-01-13 DIAGNOSIS — I82.90 CLOT: ICD-10-CM

## 2022-01-13 PROCEDURE — 99205 OFFICE O/P NEW HI 60 MIN: CPT | Performed by: INTERNAL MEDICINE

## 2022-01-13 PROCEDURE — G0463 HOSPITAL OUTPT CLINIC VISIT: HCPCS

## 2022-01-13 ASSESSMENT — MIFFLIN-ST. JEOR: SCORE: 1135.14

## 2022-01-13 NOTE — PROGRESS NOTES
"[facility]        Patient is here for a follow up  to discuss about radial artery DVT      /77 (BP Location: Left arm, Patient Position: Chair, Cuff Size: Adult Regular)   Pulse 73   Resp 16   Ht 5' 4\" (1.626 m)   Wt 129 lb (58.5 kg)   LMP 03/06/2008   SpO2 97%   BMI 22.14 kg/m      The provider has been notified that the patient has no concerns.     Questions patient would like addressed today are: N/A.    Refills are needed: No    Has homecare services and agency name:  [facility]      "

## 2022-01-13 NOTE — PROGRESS NOTES
INITIAL VASCULAR MEDICINE ASSESSMENT  REFERRING MD: DR. WERO DEXTER  REASON FOR REFERRAL: RIGHT RADIAL ARTERY THROMBUS IATROGENICALLY ACQUIRED AFTER 6/2021 HOSPITALIZATION WHEREIN SHE UNDERWENT MULTIPLE VENIPUNCTURES. sHE DENIES HAVING HAD A RADIAL ARTERIAL LINE HOWEVER.       HPI: Dr. Misha Crawford a hand specialist in the [facility] area from [facility] who has been seeing the patient for evaluation of numbness and weakness in her right hand and arm since having an IV placed in that arm last summer.  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 Crawford feels its best that the patient be treated with anticoagulation.      Had ultrasound done 11/23/21 which revealed a distal radial artery occlusion proximal to the trifurcation at the first web.  There was some mild recanalization noted.     In talking to the patient she states that she had a hospitalization in June 2021 for elevated liver function tests.  She underwent numerous lab tests and IV placements in her right arm during this hospitalization and her liver function test improved prior to discharge with no obvious cause or source.  Shortly after this she developed numbness tingling and a cool sensation in her right upper distal extremity.  She contacted her obstetrician and gynecologist who then referred her to the above physician for assessment.  Patient underwent an evaluation in November which included an EMG and a routine assessment followed by a right upper extremity ultrasound which demonstrated the above findings.         Review Of Systems  Skin: negative  Eyes: negative  Ears/Nose/Throat: negative  Respiratory: No shortness of breath, dyspnea on exertion, cough, or hemoptysis  Cardiovascular: negative  Gastrointestinal: negative  Genitourinary: negative  Musculoskeletal: negative  Neurologic: negative  Psychiatric: negative  Hematologic/Lymphatic/Immunologic:  negative  Endocrine: negative      PAST MEDICAL HISTORY:                  Past Medical History:   Diagnosis Date     Abdominal pain, epigastric 6/2/2021     Age-related osteoporosis without current pathological fracture      Asthma      Asthma      JENNIFER III (cervical intraepithelial neoplasia grade III) with severe dysplasia 1991    [facility] biopsy     Hyperlipidemia LDL goal <130      Idiopathic thrombocytopenic purpura (H) 1981     Melanotic stools 6/2/2021     Mild intermittent asthma without complication 6/2/2021       PAST SURGICAL HISTORY:                  Past Surgical History:   Procedure Laterality Date     BIOPSY BREAST Right      BREAST BIOPSY, CORE RT/LT      benign     CONIZATION  1991    JENNIFER III     LASER TX, CERVICAL  12/23/91    Laser TX Cervical JENNIFER 3 neg margins, no invasion     LUMPECTOMY BREAST      benign     SPLENECTOMY       SPLENECTOMY, TOTAL  1984    ITP     WISDOM ST GUIDEWIRE         CURRENT MEDICATIONS:                  Current Outpatient Medications   Medication Sig Dispense Refill     albuterol (PROAIR HFA/PROVENTIL HFA/VENTOLIN HFA) 108 (90 Base) MCG/ACT inhaler Inhale 2 puffs into the lungs every 6 hours       apixaban ANTICOAGULANT (ELIQUIS) 5 MG tablet Take 1 tablet (5 mg) by mouth 2 times daily 60 tablet 1     calcium carbonate (OS-ROBERT) 600 MG tablet Take by mouth daily        fluticasone (FLOVENT HFA) 110 MCG/ACT inhaler Inhale 1 puff into the lungs 2 times daily as needed        Multiple Vitamins-Minerals (MULTIVITAMIN ADULTS PO)        raloxifene (EVISTA) 60 MG tablet Take 1 tablet (60 mg) by mouth daily 90 tablet 3     vitamin C (ASCORBIC ACID) 250 MG tablet Take 250 mg by mouth daily       VITAMIN D PO Take 2,000 Units by mouth daily         ALLERGIES:                  Allergies   Allergen Reactions     Fosamax [Alendronic Acid]      Elevated liver enzymes      Other Allergy (See Comments) [External Allergen Needs Reconciliation - See Comment] Hives and Swelling     Horse       Seasonal Allergies        SOCIAL HISTORY:                  Social History     Socioeconomic History     Marital status:      Spouse name: Not on file     Number of children: 2     Years of education: Not on file     Highest education level: Not on file   Occupational History     Occupation: infusion specialist     Comment: [employer]   Tobacco Use     Smoking status: Never Smoker     Smokeless tobacco: Never Used   Vaping Use     Vaping Use: Never used   Substance and Sexual Activity     Alcohol use: Yes     Drug use: No     Sexual activity: Not Currently     Partners: Male     Birth control/protection: Post-menopausal     Comment: vasectomy   Other Topics Concern     Parent/sibling w/ CABG, MI or angioplasty before 65F 55M? Not Asked   Social History Narrative    She had a child in  and a second in . She had her first grandchild in .      Social Determinants of Health     Financial Resource Strain: Not on file   Food Insecurity: Not on file   Transportation Needs: Not on file   Physical Activity: Not on file   Stress: Not on file   Social Connections: Not on file   Intimate Partner Violence: Not on file   Housing Stability: Not on file       FAMILY HISTORY:                   Family History   Problem Relation Age of Onset     Cancer Mother          of cancer possibly from breast at age 55-60.     Breast Cancer Mother      Cancer Father      Asthma Father      Diabetes Sister      Heart Disease Sister 68        heart attack     Thyroid Disease Sister      Heart Disease Sister          due to heart issues     Heart Disease Sister      Heart Disease Sister         ablations     Thyroid nodules Sister      Diabetes Type 2  Sister      Heart Disease Sister          age 55-60?     No Known Problems Brother      No Known Problems Brother      Skin Cancer Maternal Aunt      Colon Cancer Maternal Aunt          Physical exam Reveals:    O/P: WNL  HEENT: WNL  NECK: No JVD, thyromegaly, or  lymphadenopathy  HEART: RRR, no murmurs, gallops, or rubs  LUNGS: CTA bilaterally without rales, wheezes, or rhonchi  GI: NABS, nondistended, nontender, soft  EXT:without cyanosis, clubbing, or edema; right thumb with slow to heal cut at the distal tip of the thumb.  NEURO: nonfocal  : no flank tenderness      Rt brachial:   3 plus palpable  Rt radial:   2 plus palpable  Rt ulnar:   3 plus palpable  Faraz filling time Rt radial: >10seconds  Faraz filling time Rt ulnar: 3 seconds      Lt brachial:   3 plus palpable  Lt radial:   3 plus palpable  Lt ulnar:   3 plus palpable  Faraz filling time Lt radial: 3 seconds  Faraz filling time Lt radial: 3 seconds      A/P:    1-(I82.90) Right radial artery thrombus  Comment: This is most likely occurring chronically at this point as a result of iatrogenic injury during one of her numerous blood draws during her 6/2021 six day hospitalization at [facility]. She states she endured multiple venous phlebotomies and venous cannulations, but that one in particular was more painful than any of the others. I suspect but will never be able to prove that there may have been inadvertent arterial cannulation with subsequent thrombus formation. Her fingers/thumbs are not acutely threatened at present. Her hand is warm with intact motor and sensation. She has been ACd therapeutically off label (for arterial thrombosis) with Eliquis, which is likely quite reasonable despite off label use given its lower bleeding profile vis a vis warfarin. Obtain below imaging studies. No immediate indication for surgical intervention, particularly in light of the fact this has likely been there over six months and is chronic at this point. Depending upon imaging findings, we will likely simply recommend six months AC, vasodilatory therapy , thermal protection strategies. I advised that she wash the cut on her thumb twice daily, apply topical antibiotic ointment to it, and keep it bandaged. .    Plan: US Upper Extremity Arterial Duplex Right, US Up        Ext Art Dop/Seg Pressures w/o Exr 3+ Lvl Ihsan          65 minutes total medical care including pre and post charting, outside record and imaging review, exam of the patient, explanation of above plan to her, who I saw for the first time today.

## 2022-01-14 ENCOUNTER — TELEPHONE (OUTPATIENT)
Dept: OTHER | Facility: CLINIC | Age: 62
End: 2022-01-14
Payer: COMMERCIAL

## 2022-01-14 NOTE — TELEPHONE ENCOUNTER
Follow up to 1/13/2022    Please arrange for:      D-dimer (non-fasting lab)     US Up Ext Art Dop/Seg Pressures w/o Exr 3+ Lvl Ihsan (Wrist-Brachial Indices)    US Upper Extremity Arterial Duplex Right    In clinic visit with Dr. Ceja 3+ days later.    Alda Crawley RN BSN  [facility]  [phone]

## 2022-01-18 ENCOUNTER — TELEPHONE (OUTPATIENT)
Dept: OTHER | Facility: CLINIC | Age: 62
End: 2022-01-18
Payer: COMMERCIAL

## 2022-01-18 NOTE — TELEPHONE ENCOUNTER
Pt has been scheduled with Dr. Ceja on 2/1/22. Will call sooner if there is any issue with the US results.

## 2022-01-18 NOTE — TELEPHONE ENCOUNTER
[facility]     Who is the name of the provider? Marcial    What is the location you see this provider at?    [facility]    Reason for call:  Question regarding her Eliquis-- should she continue on or stop after a certain time? Just wanted more direction.     : Crys    Phone number to call: [phone]    Additional Notes: OK to GLADIS

## 2022-01-18 NOTE — TELEPHONE ENCOUNTER
per LOV 1/13/22:    Depending upon imaging findings, we will likely simply recommend six months AC, vasodilatory therapy , thermal protection strategies.    Routing to Dr. Ceja to verify patient should continue taking Eliquis until imaging is complete and she follows up with him at her appointment on 2/1/22.    Alda Crawley RN BSN  [facility]  [phone]

## 2022-01-20 ENCOUNTER — HOSPITAL ENCOUNTER (OUTPATIENT)
Dept: ULTRASOUND IMAGING | Facility: CLINIC | Age: 62
End: 2022-01-20
Attending: INTERNAL MEDICINE
Payer: COMMERCIAL

## 2022-01-20 ENCOUNTER — LAB (OUTPATIENT)
Dept: LAB | Facility: CLINIC | Age: 62
End: 2022-01-20
Payer: COMMERCIAL

## 2022-01-20 DIAGNOSIS — I82.90 CLOT: ICD-10-CM

## 2022-01-20 LAB — D DIMER PPP FEU-MCNC: <0.27 UG/ML FEU (ref 0–0.5)

## 2022-01-20 PROCEDURE — 93923 UPR/LXTR ART STDY 3+ LVLS: CPT

## 2022-01-20 PROCEDURE — 85379 FIBRIN DEGRADATION QUANT: CPT

## 2022-01-20 PROCEDURE — 93931 UPPER EXTREMITY STUDY: CPT | Mod: RT

## 2022-01-20 PROCEDURE — 36415 COLL VENOUS BLD VENIPUNCTURE: CPT

## 2022-02-01 ENCOUNTER — OFFICE VISIT (OUTPATIENT)
Dept: OTHER | Facility: CLINIC | Age: 62
End: 2022-02-01
Attending: INTERNAL MEDICINE
Payer: COMMERCIAL

## 2022-02-01 VITALS
WEIGHT: 130.6 LBS | SYSTOLIC BLOOD PRESSURE: 131 MMHG | BODY MASS INDEX: 22.42 KG/M2 | HEART RATE: 77 BPM | DIASTOLIC BLOOD PRESSURE: 80 MMHG | OXYGEN SATURATION: 99 %

## 2022-02-01 DIAGNOSIS — I82.90 CLOT: ICD-10-CM

## 2022-02-01 PROCEDURE — G0463 HOSPITAL OUTPT CLINIC VISIT: HCPCS

## 2022-02-01 PROCEDURE — 99214 OFFICE O/P EST MOD 30 MIN: CPT | Performed by: INTERNAL MEDICINE

## 2022-02-01 NOTE — PROGRESS NOTES
Crys Quarles is a 61 year old female who is presenting at the current time to discuss her diagnosi(es) of     Right radial artery thrombus.             HPI: Dr. Misha Crawford is a hand specialist in the [facility] area from [place] [facility] who has been seeing Crys Quarles  for evaluation of numbness and weakness in her right hand and arm since having an IV placed in that arm last summer.  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 Crawford felt it best that the patient be treated with anticoagulation.      Had ultrasound done 11/23/21 which revealed a distal radial artery occlusion proximal to the trifurcation at the first web. There was some mild recanalization noted.     In talking to the patient she states that she had a hospitalization in June 2021 for elevated liver function tests.  She underwent numerous lab tests and IV placements in her right arm during this hospitalization and her liver function test improved prior to discharge with no obvious cause or source.  Shortly after this she developed numbness tingling and a cool sensation in her right upper distal extremity.  She contacted her obstetrician and gynecologist who then referred her to the above physician for assessment.  Patient underwent an evaluation in November which included an EMG and a routine assessment followed by a right upper extremity ultrasound which demonstrated the above findings.     When I first saw her on 1/13/2022,  I stated this was most likely occurring chronically at that point as a result of iatrogenic injury during one of her numerous blood draws during her 6/2021 six day hospitalization at [facility]. She stated she endured multiple venous phlebotomies and venous cannulations, but that one in particular was more painful than any of the others. I suspect but will never be able to prove that there may have been  inadvertent arterial cannulation with subsequent thrombus formation. Her fingers/thumbs were not acutely threatened at that point. Her hand was warm with intact motor and sensation. She had been ACd therapeutically off label (for arterial thrombosis) with Eliquis since 12/27/21, which is likely quite reasonable despite off label use given its lower bleeding profile vis a vis warfarin.We obtained upper extremity arterial duplex which revealed that with AC, she has recannulated her right radial artery. No other arterial stenoses or occlusions were noted in her bilateral upper extremities.      Review Of Systems  Skin: negative  Eyes: negative  Ears/Nose/Throat: negative  Respiratory: No shortness of breath, dyspnea on exertion, cough, or hemoptysis  Cardiovascular: negative  Gastrointestinal: negative  Genitourinary: negative  Musculoskeletal: negative  Neurologic: negative  Psychiatric: negative  Hematologic/Lymphatic/Immunologic: negative  Endocrine: negative      PAST MEDICAL HISTORY:                  Past Medical History:   Diagnosis Date     Abdominal pain, epigastric 6/2/2021     Age-related osteoporosis without current pathological fracture      Asthma      Asthma      JENNIFER III (cervical intraepithelial neoplasia grade III) with severe dysplasia 1991    [facility] biopsy     Hyperlipidemia LDL goal <130      Idiopathic thrombocytopenic purpura (H) 1981     Melanotic stools 6/2/2021     Mild intermittent asthma without complication 6/2/2021       PAST SURGICAL HISTORY:                  Past Surgical History:   Procedure Laterality Date     BIOPSY BREAST Right      BREAST BIOPSY, CORE RT/LT      benign     CONIZATION  1991    JENNIFER III     LASER TX, CERVICAL  12/23/91    Laser TX Cervical JENNIFER 3 neg margins, no invasion     LUMPECTOMY BREAST      benign     SPLENECTOMY       SPLENECTOMY, TOTAL  1984    ITP     WISDOM ST GUIDEWIRE         CURRENT MEDICATIONS:                  Current Outpatient Medications   Medication Sig  Dispense Refill     albuterol (PROAIR HFA/PROVENTIL HFA/VENTOLIN HFA) 108 (90 Base) MCG/ACT inhaler Inhale 2 puffs into the lungs every 6 hours       apixaban ANTICOAGULANT (ELIQUIS) 5 MG tablet Take 1 tablet (5 mg) by mouth 2 times daily 60 tablet 1     calcium carbonate (OS-ROBERT) 600 MG tablet Take by mouth daily        fluticasone (FLOVENT HFA) 110 MCG/ACT inhaler Inhale 1 puff into the lungs 2 times daily as needed  (Patient not taking: Reported on 1/13/2022)       Multiple Vitamins-Minerals (MULTIVITAMIN ADULTS PO)  (Patient not taking: Reported on 1/13/2022)       raloxifene (EVISTA) 60 MG tablet Take 1 tablet (60 mg) by mouth daily 90 tablet 3     vitamin C (ASCORBIC ACID) 250 MG tablet Take 250 mg by mouth daily (Patient not taking: Reported on 1/13/2022)       VITAMIN D PO Take 2,000 Units by mouth daily (Patient not taking: Reported on 1/13/2022)         ALLERGIES:                  Allergies   Allergen Reactions     Fosamax [Alendronic Acid]      Elevated liver enzymes      Other Allergy (See Comments) [External Allergen Needs Reconciliation - See Comment] Hives and Swelling     Horse      Seasonal Allergies        SOCIAL HISTORY:                  Social History     Socioeconomic History     Marital status:      Spouse name: Not on file     Number of children: 2     Years of education: Not on file     Highest education level: Not on file   Occupational History     Occupation: infusion specialist     Comment: [employer]   Tobacco Use     Smoking status: Never Smoker     Smokeless tobacco: Never Used   Vaping Use     Vaping Use: Never used   Substance and Sexual Activity     Alcohol use: Yes     Drug use: No     Sexual activity: Not Currently     Partners: Male     Birth control/protection: Post-menopausal     Comment: vasectomy   Other Topics Concern     Parent/sibling w/ CABG, MI or angioplasty before 65F 55M? Not Asked   Social History Narrative    She had a child in 1988 and a second in  . She had her first grandchild in 2015.      Social Determinants of Health     Financial Resource Strain: Not on file   Food Insecurity: Not on file   Transportation Needs: Not on file   Physical Activity: Not on file   Stress: Not on file   Social Connections: Not on file   Intimate Partner Violence: Not on file   Housing Stability: Not on file       FAMILY HISTORY:                   Family History   Problem Relation Age of Onset     Cancer Mother          of cancer possibly from breast at age 55-60.     Breast Cancer Mother      Cancer Father      Asthma Father      Diabetes Sister      Heart Disease Sister 68        heart attack     Thyroid Disease Sister      Heart Disease Sister          due to heart issues     Heart Disease Sister      Heart Disease Sister         ablations     Thyroid nodules Sister      Diabetes Type 2  Sister      Heart Disease Sister          age 55-60?     No Known Problems Brother      No Known Problems Brother      Skin Cancer Maternal Aunt      Colon Cancer Maternal Aunt          Physical exam Reveals:    O/P: WNL  HEENT: WNL  NECK: No JVD, thyromegaly, or lymphadenopathy  HEART: RRR, no murmurs, gallops, or rubs  LUNGS: CTA bilaterally without rales, wheezes, or rhonchi  GI: NABS, nondistended, nontender, soft  EXT:without cyanosis, clubbing, or edema  NEURO: nonfocal  : no flank tenderness      Rt radial:                                  2 plus palpable  Rt ulnar:                                  3 plus palpable  Faraz filling time Rt radial:       7seconds  Faraz filling time Rt ulnar:        3 seconds        Lt radial:                                  3 plus palpable  Lt ulnar:                                   3 plus palpable  Faraz filling time Lt radial:        4 seconds  Faraz filling time Lt radial:        3 seconds         US UE ART DOPLR SEG PRESS W/O EXR, 3+ LVL COLIN   2022 3:19 PM      HISTORY: Clot.     COMPARISON: None.     FINDINGS:  Right CARINE:    Radial artery: 154, index of 1.05.  Ulnar artery: 136, index of 0.93     Left CARINE:   Radial artery: 123, index of 0.84.  Ulnar artery: 147, index of 1.0      Waveforms: Both brachial and ulnar waveforms are triphasic. The left  radial waveform is triphasic and the right radial waveform is  triphasic/biphasic.                                                                      IMPRESSION: Arteries in both upper extremities are patent without  suggestion of occlusion or stenosis.     CARINE CRITERIA:  >1.4 NC  0.95-1.4 Normal  0.90 - 0.94 Mild  0.5 - 0.89 Moderate  0.2 - 0.49 Severe  <0.2 Critical     JORGE KIM MD         US UPPER EXTREMITY ARTERIAL DUPLEX RIGHT 1/20/2022 3:19 PM     HISTORY: 61-year-old woman with history of right radial artery  occlusion. Request made for evaluation of degree of  stenosis/occlusion.     COMPARISON: None     TECHNIQUE: Color Doppler and spectral waveform analysis obtained  throughout the arteries of the right upper extremity.     FINDINGS: The subclavian artery is patent with diameters ranging from  6.9 and 8.9 mm. No velocity elevation to suggest stenosis. The  axillary artery is 5.8 mm with velocity of 78 cm/second. The brachial  artery is 3.5 to 4.4 mm with normal velocities of 103-116 cm/second.  The ulnar artery is 2.5 to 3.4 mm with normal velocities. The radial  artery is 1.9 to 2 mm and patent with triphasic/biphasic waveforms.  Ulnar arterial waveforms are biphasic.                                                                      IMPRESSION:  1. Patent arteries in the right upper extremity.  2. More specifically, the visible right radial artery is patent.     JORGE KIM MD                A/P:     1-(I82.90) Right radial artery thrombus  Comment: This was most likely occurring chronically at this point as a result of iatrogenic injury during one of her numerous blood draws during her 6/2021 six day hospitalization at [facility]. She states she endured  multiple venous phlebotomies and venous cannulations, but that one in particular was more painful than any of the others. I suspect but will never be able to prove that there may have been inadvertent arterial cannulation with subsequent thrombus formation. Her fingers/thumbs are not acutely threatened at present. Her hand is warm with intact motor and sensation. She has been ACd therapeutically off label (for arterial thrombosis) with Eliquis, which is likely quite reasonable despite off label use given its lower bleeding profile vis a vis warfarin. With that, her imaging of today reveals resolution of thrombus  Plan:   No indication for surgical intervention, particularly in light of the fact this has likely been there over six months, is chronic at this point, and has resorbed. I would  simply recommend six months AC, w/o vasodilatory therapy as she has no pain, but with thermal protection strategies. Check d dimer and repeat arterial RUE duplex around 6/27/22, RTC several days later to consider cessation of AC.

## 2022-02-01 NOTE — PROGRESS NOTES
[facility]        Patient is here for a  follow up.     Pt is currently taking Eliquis.    /80 (BP Location: Right arm, Patient Position: Chair, Cuff Size: Adult Regular)   Pulse 77   Wt 130 lb 9.6 oz (59.2 kg)   LMP 03/06/2008   SpO2 99%   Breastfeeding No   BMI 22.42 kg/m      The provider has been notified that the patient has no concerns.     Questions patient would like addressed today are: N/A.    Refills are needed: N/A    Has homecare services and agency name:  Becca Mcintyre MA

## 2022-02-08 ENCOUNTER — TELEPHONE (OUTPATIENT)
Dept: OTHER | Facility: CLINIC | Age: 62
End: 2022-02-08
Payer: COMMERCIAL

## 2022-02-08 NOTE — TELEPHONE ENCOUNTER
[facility]     Who is the name of the provider? Marcial    What is the location you see this provider at?    [place]    Reason for call:  What can she take for a headache?  She took Tyelenol but it hasn't helped.  Can she take Sudafed or Excedrin?  She has had the headache since Sunday and it is behind her eyes.  Pain range between 3-4.  Her blood pressure is normal 130/80.   Please advise.      :  Crys Betancur    Phone number to call:  [phone]    Additional Notes:       Dolores Neely    [facility]  [facility]   [phone]

## 2022-02-08 NOTE — TELEPHONE ENCOUNTER
Patient saw Dr. Ceja on 2/1/22 for right radial artery thrombus. She is currently anticoagulated with Eliquis. It was recommended that she complete six months of AC and return to clinic around that time for repeat imaging and labs and to discuss possible cessation of AC.    I called Crys and she reports that she has had a headache since Sunday and Tylenol is not helping. She is wondering if it is ok to take Sudafed since she is on Eliquis. I explained it is but it is not safe to take any sort of NSAID's due to the risk of increased bleeding.    Crys denied any other symptoms with her headache and denied any recent falls or hitting her head. I advised her to follow up with her PCP should her headache persists and she verbalized understanding.    Alyx CHEN, RN    [facility]  [facility]  Office: [phone]  Fax: [phone]

## 2022-02-28 ENCOUNTER — TELEPHONE (OUTPATIENT)
Dept: OTHER | Facility: CLINIC | Age: 62
End: 2022-02-28
Payer: COMMERCIAL

## 2022-02-28 NOTE — TELEPHONE ENCOUNTER
Prior Authorization Retail Medication Request    Medication/Dose:   apixaban ANTICOAGULANT (ELIQUIS) 5 MG tablet  Take 1 tablet (5 mg) by mouth 2 times daily    ICD code: Clot [I82.90]  (radial artery DVT)    Insurance:  [facility] OUT OF STATE [[id number]]    Pharmacy:  [id number] - [facility] [address]    CoverMyMeds  Key:  [id number]    Routing to LUCINDA Meek.  Alda Crawley RN BSN  [facility]

## 2022-03-07 NOTE — TELEPHONE ENCOUNTER
Central Prior Authorization Team  Phone: [phone]    PA Initiation    Medication: ELIQUIS 5 MG tablet  Insurance Company: [facility] - Phone [phone] Fax [phone]  Pharmacy Filling the Rx: [id number] - [place], [place] - [address]  Filling Pharmacy Phone: [phone]  Filling Pharmacy Fax:    Start Date: 3/7/2022

## 2022-03-08 NOTE — TELEPHONE ENCOUNTER
No pa needed- this medication is covered without needing a pa. The pharmacy has received a paid claim.

## 2022-03-31 ENCOUNTER — ANCILLARY PROCEDURE (OUTPATIENT)
Dept: BONE DENSITY | Facility: CLINIC | Age: 62
End: 2022-03-31
Attending: INTERNAL MEDICINE
Payer: COMMERCIAL

## 2022-03-31 DIAGNOSIS — M81.0 AGE-RELATED OSTEOPOROSIS WITHOUT CURRENT PATHOLOGICAL FRACTURE: ICD-10-CM

## 2022-03-31 PROCEDURE — 77080 DXA BONE DENSITY AXIAL: CPT | Performed by: INTERNAL MEDICINE

## 2022-04-12 ENCOUNTER — VIRTUAL VISIT (OUTPATIENT)
Dept: ENDOCRINOLOGY | Facility: CLINIC | Age: 62
End: 2022-04-12
Payer: COMMERCIAL

## 2022-04-12 DIAGNOSIS — M81.0 AGE-RELATED OSTEOPOROSIS WITHOUT CURRENT PATHOLOGICAL FRACTURE: Primary | ICD-10-CM

## 2022-04-12 PROCEDURE — 99213 OFFICE O/P EST LOW 20 MIN: CPT | Mod: 95 | Performed by: INTERNAL MEDICINE

## 2022-04-12 RX ORDER — RALOXIFENE HYDROCHLORIDE 60 MG/1
60 TABLET, FILM COATED ORAL DAILY
Qty: 90 TABLET | Refills: 3 | Status: SHIPPED | OUTPATIENT
Start: 2022-04-12 | End: 2023-05-23

## 2022-04-12 NOTE — PROGRESS NOTES
Patient is being evaluated via a billable video visit.      How would you like to obtain your AVS? Reviewed verbally  If the video visit is dropped, the invitation should be resent by cell phone  Will anyone else be joining your video visit? no      Video Start Time:  8:00 am    Video-Visit Details    Type of service:  Video Visit    Video End Time:  8:15 am    Originating Location (pt. Location): home    Distant Location (provider location):  [facility]/home    Platform used for Video Visit:  [employer]          Recent issues:  Osteoporosis follow-up evaluation  Previous acute illness and [facility] hospitalization late 5/2021, alendronate med discontinued  Subsequently started Raloxifene 8/2021  Had recent preappt DEXA 3/31/22, results reviewed        1982. History of ITP, then Prednisone medication for ~2 yrs duration  Menopause early 50's, no subsequent HRT  Previous medical evaluations with Dr. Bettina Salgado/[facility]    1/23/17 DEXA:   L1-4    T-score:  -0.4    Left fem neck   T-score: -2.0   Right fem neck  T-score: 12.2    12/2020. GYN evaluation with Dr. Bebo Mariscal/[facility]   1/28/21 DEXA:               Lumbar Spine (L1-L4)       T-score:  -0.4, degenerative changes present               Left Femoral Neck             T-score:  -2.5               Right Femoral Neck           T-score:  -2.5    Health history includes:  Bone fractures:   none  Vit D deficiency:   yes   Kidney stones:   none  Osteoporosis med:   none previously  Supplements:    MVI 1-tab daily       Calcium 600 mg 1-tab daily       Vit C 500 mg 1-tab daily  Previous [facility] labs include:     Lab Test 12/15/20  1534   VITDT 49         4/2/21. Initial osteoporosis evaluation with me at [place]  Reviewed health history and osteoporosis management  Advised starting alendronate medication with weekly dosing  She recalls having some abdominal bloating while on the medication    5/31/21. Hospitalization with cold  intolerance, then febrile illness  Subsequently had COVID-19 test which was negative  Went to [facility] ED and admitted  GI symptoms, elevated liver tests  Decision made to stop alendronate medication, last dose 5/28/21  Repeat lab testing after hospitalization, recalls improvement with liver tests  Developed right hand (index & thumb) coldness and numbness, subsequently dx radial artery clot    8/2021. Med change to raloxifene (Evista) 60 mg daily at nighttime  Tolerating medication well without apparent SE's    3/31/22 DEXA:   L1-4    T-score: -0.1   Left fem neck:   T-score: -2.4   Right fem neck:  T-score: -2.4    Recent [facility] labs include:  Lab Results   Component Value Date     08/24/2021    POTASSIUM 4.0 08/24/2021    CHLORIDE 107 08/24/2021    CO2 25 08/24/2021    ANIONGAP 11 08/24/2021     08/24/2021    BUN 18 08/24/2021    CR 0.79 08/24/2021    GFRESTIMATED 82 08/24/2021    GFRESTBLACK >60 06/10/2021    ROBERT 9.3 11/15/2021    TSH 2.91 11/15/2021    VITDT 37 11/15/2021    PTHI 79 11/15/2021         Lives in [place], [place]  Sees Dr. Bebo Mariscal/[facility] for medicine evaluations.  Also sees Dr. Nathaniel Ceja/[facility]    PMH/PSH:  Past Medical History:   Diagnosis Date     Abdominal pain, epigastric 06/02/2021     Age-related osteoporosis without current pathological fracture      Asthma      Asthma      JENNIFER III (cervical intraepithelial neoplasia grade III) with severe dysplasia 01/01/1991    [facility] biopsy     Hyperlipidemia LDL goal <130      Idiopathic thrombocytopenic purpura (H) 1981     Melanotic stools 06/02/2021     Mild intermittent asthma without complication 06/02/2021     Paresthesias in right hand     right index & thumb, radial a. clot?     Past Surgical History:   Procedure Laterality Date     BIOPSY BREAST Right      BREAST BIOPSY, CORE RT/LT      benign     CONIZATION  1991    JENNIFER III     LASER TX, CERVICAL  12/23/91    Laser TX  Cervical JENNIFER 3 neg margins, no invasion     LUMPECTOMY BREAST      benign     SPLENECTOMY       SPLENECTOMY, TOTAL      ITP     WISDOM ST GUIDEWI         Family Hx:  Family History   Problem Relation Age of Onset     Cancer Mother          of cancer possibly from breast at age 55-60.     Breast Cancer Mother      Cancer Father      Asthma Father      Diabetes Sister      Heart Disease Sister 68        heart attack     Thyroid Disease Sister      Heart Disease Sister          due to heart issues     Heart Disease Sister      Heart Disease Sister         ablations     Thyroid nodules Sister      Diabetes Type 2  Sister      Heart Disease Sister          age 55-60?     No Known Problems Brother      No Known Problems Brother      Skin Cancer Maternal Aunt      Colon Cancer Maternal Aunt          Social Hx:  Social History     Socioeconomic History     Marital status:      Spouse name: Not on file     Number of children: 2     Years of education: Not on file     Highest education level: Not on file   Occupational History     Occupation: infusion specialist     Comment: [employer]   Tobacco Use     Smoking status: Never Smoker     Smokeless tobacco: Never Used   Vaping Use     Vaping Use: Never used   Substance and Sexual Activity     Alcohol use: Yes     Drug use: No     Sexual activity: Not Currently     Partners: Male     Birth control/protection: Post-menopausal     Comment: vasectomy   Other Topics Concern     Parent/sibling w/ CABG, MI or angioplasty before 65F 55M? Not Asked   Social History Narrative    She had a child in  and a second in . She had her first grandchild in .      Social Determinants of Health     Financial Resource Strain: Not on file   Food Insecurity: Not on file   Transportation Needs: Not on file   Physical Activity: Not on file   Stress: Not on file   Social Connections: Not on file   Intimate Partner Violence: Not on file   Housing Stability: Not  on file          MEDICATIONS:  has a current medication list which includes the following prescription(s): albuterol, apixaban anticoagulant, calcium carbonate, fluticasone, raloxifene, multiple vitamins-minerals, vitamin c, and vitamin d.    ROS:     ROS: 10 point ROS neg other than the symptoms noted above in the HPI.    GENERAL: mild fatigue, wt stable; denies fevers, chills, night sweats.    HEENT: no dysphagia, odonophagia, diplopia, neck pain  THYROID:  no apparent hyper or hypothyroid symptoms  CV: no chest pain, pressure, palpitations  LUNGS: no SOB, HOLLIDAY, cough, wheezing   ABDOMEN: no diarrhea, constipation, abdominal pain  EXTREMITIES: no rashes, ulcers, edema  NEUROLOGY: some numbness at right hand index and thumb; no headaches, denies changes in vision, tingling, extremitiy numbness   MSK:  lower back pains; denies muscle weakness  SKIN: no rashes or lesions  : no menses since early 50's, denies hot flashes  PSYCH:  stable mood, no significant anxiety or depression  ENDOCRINE: no heat or cold intolerance    Physical Exam (visual exam)  VS:  no vital signs taken for video visit  CONSTITUTIONAL: healthy, alert and NAD, well dressed, answering questions appropriately  ENT: no nose swelling or nasal discharge, mouth redness or gum changes.  EYES: eyes grossly normal to inspection, conjunctivae and sclerae normal, no exophthalmos or proptosis  THYROID:  no apparent nodules or goiter  LUNGS: no audible wheeze, cough or visible cyanosis, no visible retractions or increased work of breathing  ABDOMEN: abdomen not evaluated  EXTREMITIES: no hand tremors, limited exam  NEUROLOGY: CN grossly intact, mentation intact and speech normal   SKIN:  no apparent skin lesions, rash, or edema with visualized skin appearance  PSYCH: mentation appears normal, affect normal/bright, judgement and insight intact,   normal speech and appearance well groomed      LABS:    All pertinent notes, labs, and images personally reviewed  by me.     A/P:  Encounter Diagnosis   Name Primary?     Age-related osteoporosis without current pathological fracture Yes       Comments:  Reviewed health history and osteoporosis issues.  Risk factors include low estrogen of menopause, previous (short duration) steroid use for ITP, vit D deficiency  Reviewed recent DEXA report information, showing hip BMD mildly improved with raloxifene med plan  Reviewed and interpreted tests   Ordered appropriate tests for the endocrinology disease management.    Management options discussed and implemented after shared medical decision making with the patient.  Osteoporosis problem is chronic-stable    Plan:  Discussed general issues with the osteoporosis diagnosis and management  Reviewed concept of using the DEXA scan imaging to assess bone mineral density (BMD)  Discussed terminology of the T-score   Reviewed osteoporosis medication treatment options    Recommend:  Continue the current raloxifene (Evista) 60 mg daily dose  Updated Rx sent to her pharmacy  Continue calcium and vitamin D supplement use  Monitor for symptom changes  Avoid heavy lifting and fall injuries  Repeat DEXA scan in 4/2023 for comparison    Cause of back pain unclear, advise she schedule appt with her PCP  Addressed patient questions today    There are no Patient Instructions on file for this visit.    Future labs ordered today: No orders of the defined types were placed in this encounter.    Radiology/Consults ordered today: None    Total time spent in with the patient evaluation:  15 min  Additional time spent reviewing pertinent lab tests and chart notes, and documentation:  10 min    Follow-up:  4/2023    BLANCA Jessica MD, MS  Endocrinology  [facility]    CC: KASEY Mariscal and ROBIN Ceja

## 2022-04-12 NOTE — Clinical Note
4/12/2022         RE: Crys Quarles  [address] [facility] [address]        Dear Colleague,    Thank you for referring your patient, Crys Quarles, to the [facility]. Please see a copy of my visit note below.    Patient is being evaluated via a billable video visit.      How would you like to obtain your AVS? Reviewed verbally  If the video visit is dropped, the invitation should be resent by cell phone  Will anyone else be joining your video visit? no  {If patient encounters technical issues they should call [phone] :[phone]}    Video Start Time:  8:00 am    Video-Visit Details    Type of service:  Video Visit    Video End Time:  8:15 am    Originating Location (pt. Location): home    Distant Location (provider location):  [facility]/home    Platform used for Video Visit:  [email or web address]          Recent issues:  Osteoporosis follow-up evaluation  Previous acute illness and [facility] hospitalization late 5/2021, alendronate med discontinued  Subsequently started Raloxifene 8/2021  Had recent preappt DEXA 3/31/22, results reviewed        1982. History of ITP, then Prednisone medication for ~2 yrs duration  Menopause early 50's, no subsequent HRT  Previous medical evaluations with Dr. Bettina Salgado/[facility]    1/23/17 DEXA:   L1-4    T-score:  -0.4    Left fem neck   T-score: -2.0   Right fem neck  T-score: 12.2    12/2020. GYN evaluation with Dr. Bebo Mariscal/[facility]   1/28/21 DEXA:               Lumbar Spine (L1-L4)       T-score:  -0.4, degenerative changes present               Left Femoral Neck             T-score:  -2.5               Right Femoral Neck           T-score:  -2.5    Health history includes:  Bone fractures:   none  Vit D deficiency:   yes   Kidney stones:   none  Osteoporosis med:   none previously  Supplements:    MVI 1-tab daily       Calcium 600 mg 1-tab daily       Vit C 500 mg 1-tab  daily  Previous [facility] labs include:     Lab Test 12/15/20  1534   VITDT 49         4/2/21. Initial osteoporosis evaluation with me at [place]  Reviewed health history and osteoporosis management  Advised starting alendronate medication with weekly dosing  She recalls having some abdominal bloating while on the medication    5/31/21. Hospitalization with cold intolerance, then febrile illness  Subsequently had COVID-19 test which was negative  Went to [facility] ED and admitted  GI symptoms, elevated liver tests  Decision made to stop alendronate medication, last dose 5/28/21  Repeat lab testing after hospitalization, recalls improvement with liver tests  Developed right hand (index & thumb) coldness and numbness, subsequently dx radial artery clot    8/2021. Med change to raloxifene (Evista) 60 mg daily at nighttime  Tolerating medication well without apparent SE's    3/31/22 DEXA:   L1-4    T-score: -0.1   Left fem neck:   T-score: -2.4   Right fem neck:  T-score: -2.4    Recent [facility] labs include:  Lab Results   Component Value Date     08/24/2021    POTASSIUM 4.0 08/24/2021    CHLORIDE 107 08/24/2021    CO2 25 08/24/2021    ANIONGAP 11 08/24/2021     08/24/2021    BUN 18 08/24/2021    CR 0.79 08/24/2021    GFRESTIMATED 82 08/24/2021    GFRESTBLACK >60 06/10/2021    ROBERT 9.3 11/15/2021    TSH 2.91 11/15/2021    VITDT 37 11/15/2021    PTHI 79 11/15/2021         Lives in [place], [place]  Sees Dr. Bebo Mariscal/[facility] for medicine evaluations.  Also sees Dr. Nathaniel Ceja/[facility]    PMH/PSH:  Past Medical History:   Diagnosis Date     Abdominal pain, epigastric 06/02/2021     Age-related osteoporosis without current pathological fracture      Asthma      Asthma      JENNIFER III (cervical intraepithelial neoplasia grade III) with severe dysplasia 01/01/1991    [facility] biopsy     Hyperlipidemia LDL goal <130      Idiopathic thrombocytopenic purpura (H) 1981      Melanotic stools 2021     Mild intermittent asthma without complication 2021     Paresthesias in right hand     right index & thumb, radial a. clot?     Past Surgical History:   Procedure Laterality Date     BIOPSY BREAST Right      BREAST BIOPSY, CORE RT/LT      benign     CONIZATION      JENNIFER III     LASER TX, CERVICAL  91    Laser TX Cervical JENNIFER 3 neg margins, no invasion     LUMPECTOMY BREAST      benign     SPLENECTOMY       SPLENECTOMY, TOTAL      ITP     WISDOM ST GUIDEWI         Family Hx:  Family History   Problem Relation Age of Onset     Cancer Mother          of cancer possibly from breast at age 55-60.     Breast Cancer Mother      Cancer Father      Asthma Father      Diabetes Sister      Heart Disease Sister 68        heart attack     Thyroid Disease Sister      Heart Disease Sister          due to heart issues     Heart Disease Sister      Heart Disease Sister         ablations     Thyroid nodules Sister      Diabetes Type 2  Sister      Heart Disease Sister          age 55-60?     No Known Problems Brother      No Known Problems Brother      Skin Cancer Maternal Aunt      Colon Cancer Maternal Aunt          Social Hx:  Social History     Socioeconomic History     Marital status:      Spouse name: Not on file     Number of children: 2     Years of education: Not on file     Highest education level: Not on file   Occupational History     Occupation: infusion specialist     Comment: [employer]   Tobacco Use     Smoking status: Never Smoker     Smokeless tobacco: Never Used   Vaping Use     Vaping Use: Never used   Substance and Sexual Activity     Alcohol use: Yes     Drug use: No     Sexual activity: Not Currently     Partners: Male     Birth control/protection: Post-menopausal     Comment: vasectomy   Other Topics Concern     Parent/sibling w/ CABG, MI or angioplasty before 65F 55M? Not Asked   Social History Narrative    She had a child in   and a second in 1991. She had her first grandchild in 2015.      Social Determinants of Health     Financial Resource Strain: Not on file   Food Insecurity: Not on file   Transportation Needs: Not on file   Physical Activity: Not on file   Stress: Not on file   Social Connections: Not on file   Intimate Partner Violence: Not on file   Housing Stability: Not on file          MEDICATIONS:  has a current medication list which includes the following prescription(s): albuterol, apixaban anticoagulant, calcium carbonate, fluticasone, raloxifene, multiple vitamins-minerals, vitamin c, and vitamin d.    ROS:     ROS: 10 point ROS neg other than the symptoms noted above in the HPI.    GENERAL: mild fatigue, wt stable; denies fevers, chills, night sweats.    HEENT: no dysphagia, odonophagia, diplopia, neck pain  THYROID:  no apparent hyper or hypothyroid symptoms  CV: no chest pain, pressure, palpitations  LUNGS: no SOB, HOLLIDAY, cough, wheezing   ABDOMEN: no diarrhea, constipation, abdominal pain  EXTREMITIES: no rashes, ulcers, edema  NEUROLOGY: some numbness at right hand index and thumb; no headaches, denies changes in vision, tingling, extremitiy numbness   MSK:  lower back pains; denies muscle weakness  SKIN: no rashes or lesions  : no menses since early 50's, denies hot flashes  PSYCH:  stable mood, no significant anxiety or depression  ENDOCRINE: no heat or cold intolerance    Physical Exam (visual exam)  VS:  no vital signs taken for video visit  CONSTITUTIONAL: healthy, alert and NAD, well dressed, answering questions appropriately  ENT: no nose swelling or nasal discharge, mouth redness or gum changes.  EYES: eyes grossly normal to inspection, conjunctivae and sclerae normal, no exophthalmos or proptosis  THYROID:  no apparent nodules or goiter  LUNGS: no audible wheeze, cough or visible cyanosis, no visible retractions or increased work of breathing  ABDOMEN: abdomen not evaluated  EXTREMITIES: no hand tremors,  limited exam  NEUROLOGY: CN grossly intact, mentation intact and speech normal   SKIN:  no apparent skin lesions, rash, or edema with visualized skin appearance  PSYCH: mentation appears normal, affect normal/bright, judgement and insight intact,   normal speech and appearance well groomed      LABS:    All pertinent notes, labs, and images personally reviewed by me.     A/P:  Encounter Diagnosis   Name Primary?     Age-related osteoporosis without current pathological fracture Yes       Comments:  Reviewed health history and osteoporosis issues.  Risk factors include low estrogen of menopause, previous (short duration) steroid use for ITP, vit D deficiency  Reviewed recent DEXA report information, showing hip BMD mildly improved with raloxifene med plan  Reviewed and interpreted tests   Ordered appropriate tests for the endocrinology disease management.    Management options discussed and implemented after shared medical decision making with the patient.  Osteoporosis problem is chronic-stable    Plan:  Discussed general issues with the osteoporosis diagnosis and management  Reviewed concept of using the DEXA scan imaging to assess bone mineral density (BMD)  Discussed terminology of the T-score   Reviewed osteoporosis medication treatment options    Recommend:  Continue the current raloxifene (Evista) 60 mg daily dose  Updated Rx sent to her pharmacy  Continue calcium and vitamin D supplement use  Monitor for symptom changes  Avoid heavy lifting and fall injuries  Repeat DEXA scan in 4/2023 for comparison    Cause of back pain unclear, advise she schedule appt with her PCP  Addressed patient questions today    There are no Patient Instructions on file for this visit.    Future labs ordered today: No orders of the defined types were placed in this encounter.    Radiology/Consults ordered today: None    Total time spent in with the patient evaluation:  15 min  Additional time spent reviewing pertinent lab tests and  chart notes, and documentation:  10 min    Follow-up:  4/2023    BLANCA Jessica MD, MS  Endocrinology  [facility]    CC: KASEY Mariscal and ROBIN Ceja          Again, thank you for allowing me to participate in the care of your patient.        Sincerely,        Bebo Jessica MD

## 2022-06-22 ENCOUNTER — LAB (OUTPATIENT)
Dept: LAB | Facility: CLINIC | Age: 62
End: 2022-06-22
Payer: COMMERCIAL

## 2022-06-22 DIAGNOSIS — I82.90 CLOT: ICD-10-CM

## 2022-06-22 LAB — D DIMER PPP FEU-MCNC: <=0.27 UG/ML FEU (ref 0–0.5)

## 2022-06-22 PROCEDURE — 36415 COLL VENOUS BLD VENIPUNCTURE: CPT

## 2022-06-22 PROCEDURE — 85379 FIBRIN DEGRADATION QUANT: CPT

## 2022-06-27 ENCOUNTER — HOSPITAL ENCOUNTER (OUTPATIENT)
Dept: ULTRASOUND IMAGING | Facility: CLINIC | Age: 62
Discharge: HOME | End: 2022-06-27
Attending: INTERNAL MEDICINE
Payer: COMMERCIAL

## 2022-06-27 ENCOUNTER — OFFICE VISIT (OUTPATIENT)
Dept: OTHER | Facility: CLINIC | Age: 62
End: 2022-06-27
Attending: INTERNAL MEDICINE
Payer: COMMERCIAL

## 2022-06-27 VITALS
SYSTOLIC BLOOD PRESSURE: 110 MMHG | BODY MASS INDEX: 22.59 KG/M2 | WEIGHT: 131.6 LBS | HEART RATE: 72 BPM | DIASTOLIC BLOOD PRESSURE: 60 MMHG

## 2022-06-27 DIAGNOSIS — I82.90 CLOT: ICD-10-CM

## 2022-06-27 PROCEDURE — G0463 HOSPITAL OUTPT CLINIC VISIT: HCPCS

## 2022-06-27 PROCEDURE — 93931 UPPER EXTREMITY STUDY: CPT | Mod: RT

## 2022-06-27 PROCEDURE — 99214 OFFICE O/P EST MOD 30 MIN: CPT | Performed by: INTERNAL MEDICINE

## 2022-06-27 NOTE — PROGRESS NOTES
[facility]        Patient is here for a  follow up.      Pt is currently taking Eliquis.    /60 (BP Location: Right arm, Patient Position: Chair, Cuff Size: Adult Regular)   Pulse 72   Wt 131 lb 9.6 oz (59.7 kg)   LMP 03/06/2008   Breastfeeding No   BMI 22.59 kg/m      The provider has been notified that the patient has no concerns.     Questions patient would like addressed today are: N/A.    Refills are needed: N/A    Has homecare services and agency name:  Becca Mcintyre MA

## 2022-06-27 NOTE — PROGRESS NOTES
Crys Quarles is a 61 year old female who is presenting at the current time to discuss her diagnosi(es) of       Clot  .          HPI: Dr. Misha Crawford is a hand specialist in the [facility] area from [facility] who has been seeing Crys Quarles  for evaluation of numbness and weakness in her right hand and arm since having an IV placed in that arm last summer.  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 Crawford felt it best that the patient be treated with anticoagulation.      Had ultrasound done 11/23/21 which revealed a distal radial artery occlusion proximal to the trifurcation at the first web. There was some mild recanalization noted.     In talking to the patient she states that she had a hospitalization in June 2021 for elevated liver function tests.  She underwent numerous lab tests and IV placements in her right arm during this hospitalization and her liver function test improved prior to discharge with no obvious cause or source.  Shortly after this she developed numbness tingling and a cool sensation in her right upper distal extremity.  She contacted her obstetrician and gynecologist who then referred her to the above physician for assessment.  Patient underwent an evaluation in November which included an EMG and a routine assessment followed by a right upper extremity ultrasound which demonstrated the above findings.     When I first saw her on 1/13/2022,  I stated this was most likely occurring chronically at that point as a result of iatrogenic injury during one of her numerous blood draws during her 6/2021 six day hospitalization at [facility]. She stated she endured multiple venous phlebotomies and venous cannulations, but that one in particular was more painful than any of the others. I suspect but will never be able to prove that there may have been inadvertent arterial cannulation  with subsequent thrombus formation. Her fingers/thumbs were not acutely threatened at that point. Her hand was warm with intact motor and sensation. She had been ACd therapeutically off label (for arterial thrombosis) with Eliquis since 12/27/21, which is likely quite reasonable despite off label use given its lower bleeding profile vis a vis warfarin.We obtained upper extremity arterial duplex which revealed that with AC, she has recannulated her right radial artery. No other arterial stenoses or occlusions were noted in her bilateral upper extremities.         She has been ACd for over six months. Thrombus has resorbed and d dimer is normal     Review Of Systems  Skin: negative  Eyes: negative  Ears/Nose/Throat: negative  Respiratory: No shortness of breath, dyspnea on exertion, cough, or hemoptysis  Cardiovascular: negative  Gastrointestinal: negative  Genitourinary: negative  Musculoskeletal: negative  Neurologic: negative  Psychiatric: negative  Hematologic/Lymphatic/Immunologic: negative  Endocrine: negative      PAST MEDICAL HISTORY:                  Past Medical History:   Diagnosis Date     Abdominal pain, epigastric 06/02/2021     Age-related osteoporosis without current pathological fracture      Asthma      Asthma      JENNIFER III (cervical intraepithelial neoplasia grade III) with severe dysplasia 01/01/1991    [facility] biopsy     Hyperlipidemia LDL goal <130      Idiopathic thrombocytopenic purpura (H) 1981     Melanotic stools 06/02/2021     Mild intermittent asthma without complication 06/02/2021     Paresthesias in right hand     right index & thumb, radial a. clot?       PAST SURGICAL HISTORY:                  Past Surgical History:   Procedure Laterality Date     BIOPSY BREAST Right      BREAST BIOPSY, CORE RT/LT      benign     CONIZATION  1991    JENNIFER III     LASER TX, CERVICAL  12/23/91    Laser TX Cervical JENNIFER 3 neg margins, no invasion     LUMPECTOMY BREAST      benign     SPLENECTOMY        SPLENECTOMY, TOTAL  1984    ITP     WISDOM [facility]         CURRENT MEDICATIONS:                  Current Outpatient Medications   Medication Sig Dispense Refill     albuterol (PROAIR HFA/PROVENTIL HFA/VENTOLIN HFA) 108 (90 Base) MCG/ACT inhaler Inhale 2 puffs into the lungs every 6 hours       apixaban ANTICOAGULANT (ELIQUIS) 5 MG tablet Take 1 tablet (5 mg) by mouth 2 times daily 180 tablet 1     calcium carbonate (OS-ROBERT) 600 MG tablet Take by mouth daily        fluticasone (FLOVENT HFA) 110 MCG/ACT inhaler Inhale 1 puff into the lungs 2 times daily as needed        Multiple Vitamins-Minerals (MULTIVITAMIN ADULTS PO)        raloxifene (EVISTA) 60 MG tablet Take 1 tablet (60 mg) by mouth in the morning. 90 tablet 3     vitamin C (ASCORBIC ACID) 250 MG tablet Take 250 mg by mouth daily       VITAMIN D PO Take 2,000 Units by mouth daily         ALLERGIES:                  Allergies   Allergen Reactions     Fosamax [Alendronic Acid]      Elevated liver enzymes      Other Allergy (See Comments) [External Allergen Needs Reconciliation - See Comment] Hives and Swelling     Horse      Seasonal Allergies        SOCIAL HISTORY:                  Social History     Socioeconomic History     Marital status:      Spouse name: Not on file     Number of children: 2     Years of education: Not on file     Highest education level: Not on file   Occupational History     Occupation: infusion specialist     Comment: [employer]   Tobacco Use     Smoking status: Never Smoker     Smokeless tobacco: Never Used   Vaping Use     Vaping Use: Never used   Substance and Sexual Activity     Alcohol use: Yes     Drug use: No     Sexual activity: Not Currently     Partners: Male     Birth control/protection: Post-menopausal     Comment: vasectomy   Other Topics Concern     Parent/sibling w/ CABG, MI or angioplasty before 65F 55M? Not Asked   Social History Narrative    She had a child in 1988 and a second in 1991. She had her  first grandchild in 2015.      Social Determinants of Health     Financial Resource Strain: Not on file   Food Insecurity: Not on file   Transportation Needs: Not on file   Physical Activity: Not on file   Stress: Not on file   Social Connections: Not on file   Intimate Partner Violence: Not on file   Housing Stability: Not on file       FAMILY HISTORY:                   Family History   Problem Relation Age of Onset     Cancer Mother          of cancer possibly from breast at age 55-60.     Breast Cancer Mother      Cancer Father      Asthma Father      Diabetes Sister      Heart Disease Sister 68        heart attack     Thyroid Disease Sister      Heart Disease Sister          due to heart issues     Heart Disease Sister      Heart Disease Sister         ablations     Thyroid nodules Sister      Diabetes Type 2  Sister      Heart Disease Sister          age 55-60?     No Known Problems Brother      No Known Problems Brother      Skin Cancer Maternal Aunt      Colon Cancer Maternal Aunt          Physical exam Reveals:    O/P: WNL  HEENT: WNL  NECK: No JVD, thyromegaly, or lymphadenopathy  HEART: RRR, no murmurs, gallops, or rubs  LUNGS: CTA bilaterally without rales, wheezes, or rhonchi  GI: NABS, nondistended, nontender, soft  EXT:without cyanosis, clubbing, or edema  NEURO: nonfocal  : no flank tenderness          Rt radial:                                  2 plus palpable  Rt ulnar:                                  3 plus palpable  Faraz filling time Rt radial:       7seconds  Faraz filling time Rt ulnar:        3 seconds        Lt radial:                                  3 plus palpable  Lt ulnar:                                   3 plus palpable  Faraz filling time Lt radial:        4 seconds  Faraz filling time Lt radial:        3 seconds           US UE ART DOPLR SEG PRESS W/O EXR, 3+ LVL COLIN   2022 3:19 PM      HISTORY: Clot.     COMPARISON: None.     FINDINGS:  Right CARINE:   Radial  artery: 154, index of 1.05.  Ulnar artery: 136, index of 0.93     Left CARINE:   Radial artery: 123, index of 0.84.  Ulnar artery: 147, index of 1.0      Waveforms: Both brachial and ulnar waveforms are triphasic. The left  radial waveform is triphasic and the right radial waveform is  triphasic/biphasic.                                                                      IMPRESSION: Arteries in both upper extremities are patent without  suggestion of occlusion or stenosis.     CARINE CRITERIA:  >1.4 NC  0.95-1.4 Normal  0.90 - 0.94 Mild  0.5 - 0.89 Moderate  0.2 - 0.49 Severe  <0.2 Critical     JORGE KIM MD           US UPPER EXTREMITY ARTERIAL DUPLEX RIGHT 1/20/2022 3:19 PM     HISTORY: 61-year-old woman with history of right radial artery  occlusion. Request made for evaluation of degree of  stenosis/occlusion.     COMPARISON: None     TECHNIQUE: Color Doppler and spectral waveform analysis obtained  throughout the arteries of the right upper extremity.     FINDINGS: The subclavian artery is patent with diameters ranging from  6.9 and 8.9 mm. No velocity elevation to suggest stenosis. The  axillary artery is 5.8 mm with velocity of 78 cm/second. The brachial  artery is 3.5 to 4.4 mm with normal velocities of 103-116 cm/second.  The ulnar artery is 2.5 to 3.4 mm with normal velocities. The radial  artery is 1.9 to 2 mm and patent with triphasic/biphasic waveforms.  Ulnar arterial waveforms are biphasic.                                                                      IMPRESSION:  1. Patent arteries in the right upper extremity.  2. More specifically, the visible right radial artery is patent.     JORGE KIM MD         Component      Latest Ref Rng & Units 1/20/2022 6/22/2022   D-Dimer Quantitative      0.00 - 0.50 ug/mL FEU <0.27 <=0.27                 A/P:     1-(I82.90) Right radial artery thrombus  Comment: This was most likely occurring chronically at this point as a result of iatrogenic injury  during one of her numerous blood draws during her 6/2021 six day hospitalization at [facility]. She states she endured multiple venous phlebotomies and venous cannulations, but that one in particular was more painful than any of the others. I suspect but will never be able to prove that there may have been inadvertent arterial cannulation with subsequent thrombus formation. Her fingers/thumbs are not acutely threatened at present. Her hand is warm with intact motor and sensation. She has been ACd therapeutically off label (for arterial thrombosis) with Eliquis, which is likely quite reasonable despite off label use given its lower bleeding profile vis a vis warfarin. With that, her imaging of today reveals resolution of thrombus. When last seen, I stated there was no indication for surgical intervention, particularly in light of the fact this had likely at that time been there over six months, was chronic at that point, and had resorbed. I simply recommended six months AC, w/o vasodilatory therapy as she had no pain, but with thermal protection strategies. We recently checked a d dimer and repeat arterial RUE duplex. Those results are notable for the d dimer being undetectable, and the radial artery thrombus still being resorbed. She presents to consider cessation of AC.          Plan:   Stop AC, Check d dimer in one month. RTC several days later to insure it is safe to remain off of AC. RTC or ED for any sxs of recurrence.             31 minutes total medical care on today's date.

## 2022-07-17 ENCOUNTER — MYC MEDICAL ADVICE (OUTPATIENT)
Dept: OTHER | Facility: CLINIC | Age: 62
End: 2022-07-17

## 2022-07-19 NOTE — TELEPHONE ENCOUNTER
Arterial duplex dated 6/27/2022 reveals no evidence of right radial artery occlusion seen on 11/23/21 U/S at [facility]. Other right upper extremity arteries had normal flow on 6/27/22 arterial duplex. No further w/u indicated presntly. Will discuss with kierra on 8/3/22.

## 2022-07-19 NOTE — TELEPHONE ENCOUNTER
Tuan Ceja,   While in the refrigerated section of the grocery store on 7/2/22 my forefinger turned white and became numb.  20 minutes later the whiteness started to fade and took on a  blue color.   I took a couple of picture which I can forward to you, if you have an email address to send them to.             Did you ever get the ultrasound images from Dr. Orellana with [facility] showing where they found the clot?    Ultrasound done 11/23/21    Per [facility]:  Incidentally noted was the occlusion of the distal dorsal branch of the radial artery as it travels into the palm around the base of the thumb.      Next office visit with Dr. Ceja 8/03/22

## 2022-07-19 NOTE — TELEPHONE ENCOUNTER
Faxed report received -  Right Radial Nerve and brachial and radial artery ultrasound examination 11/23/21.    [facility]  Placed on Dr. Ceja's desk for review.    Images confirmed in PACS as well.    Alda Crawley RN BSN  [facility]  [phone]

## 2022-07-19 NOTE — TELEPHONE ENCOUNTER
I will have to diwscuss this with her on 8/3 when I see her. For now , no intervention warranted. Please tell me where I the chart I can find the [facility] ultrasound.

## 2022-07-28 ENCOUNTER — LAB (OUTPATIENT)
Dept: LAB | Facility: CLINIC | Age: 62
End: 2022-07-28
Payer: COMMERCIAL

## 2022-07-28 DIAGNOSIS — I82.90 CLOT: ICD-10-CM

## 2022-07-28 LAB — D DIMER PPP FEU-MCNC: <=0.27 UG/ML FEU (ref 0–0.5)

## 2022-07-28 PROCEDURE — 85379 FIBRIN DEGRADATION QUANT: CPT

## 2022-07-28 PROCEDURE — 36415 COLL VENOUS BLD VENIPUNCTURE: CPT

## 2022-08-03 ENCOUNTER — VIRTUAL VISIT (OUTPATIENT)
Dept: OTHER | Facility: CLINIC | Age: 62
End: 2022-08-03
Attending: INTERNAL MEDICINE
Payer: COMMERCIAL

## 2022-08-03 DIAGNOSIS — I82.90 CLOT: Primary | ICD-10-CM

## 2022-08-03 PROCEDURE — 99213 OFFICE O/P EST LOW 20 MIN: CPT | Mod: 95 | Performed by: INTERNAL MEDICINE

## 2022-08-03 NOTE — PROGRESS NOTES
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 Crawford felt it best that the patient be treated with anticoagulation.      Had ultrasound done 11/23/21 which revealed a distal radial artery occlusion proximal to the trifurcation at the first web. There was some mild recanalization noted.     In talking to the patient she states that she had a hospitalization in June 2021 for elevated liver function tests.  She underwent numerous lab tests and IV placements in her right arm during this hospitalization and her liver function test improved prior to discharge with no obvious cause or source.  Shortly after this she developed numbness tingling and a cool sensation in her right upper distal extremity.  She contacted her obstetrician and gynecologist who then referred her to the above physician for assessment.  Patient underwent an evaluation in November which included an EMG and a routine assessment followed by a right upper extremity ultrasound which demonstrated the above findings.     When I first saw her on 1/13/2022,  I stated this was most likely occurring chronically at that point as a result of iatrogenic injury during one of her numerous blood draws during her 6/2021 six day hospitalization at [facility]. She stated she endured multiple venous phlebotomies and venous cannulations, but that one in particular was more painful than any of the others. I suspect but will never be able to prove that there may have been inadvertent arterial cannulation with subsequent thrombus formation. Her fingers/thumbs were not acutely threatened at that point. Her hand was warm with intact motor and  sensation. She had been ACd therapeutically off label (for arterial thrombosis) with Eliquis since 12/27/21, which is likely quite reasonable despite off label use given its lower bleeding profile vis a vis warfarin.We obtained upper extremity arterial duplex which revealed that with AC, she has recannulated her right radial artery. No other arterial stenoses or occlusions were noted in her bilateral upper extremities.         She has been ACd for over six months. Thrombus has resorbed and d dimer is normal      Review Of Systems  Skin: negative  Eyes: negative  Ears/Nose/Throat: negative  Respiratory: No shortness of breath, dyspnea on exertion, cough, or hemoptysis  Cardiovascular: negative  Gastrointestinal: negative  Genitourinary: negative  Musculoskeletal: vasospastic sxs in fingers  Neurologic: negative  Psychiatric: negative  Hematologic/Lymphatic/Immunologic: negative  Endocrine: negative        PAST MEDICAL HISTORY:                  Past Medical History        Past Medical History:   Diagnosis Date     Abdominal pain, epigastric 06/02/2021     Age-related osteoporosis without current pathological fracture       Asthma       Asthma       JENNIFER III (cervical intraepithelial neoplasia grade III) with severe dysplasia 01/01/1991     [facility] biopsy     Hyperlipidemia LDL goal <130       Idiopathic thrombocytopenic purpura (H) 1981     Melanotic stools 06/02/2021     Mild intermittent asthma without complication 06/02/2021     Paresthesias in right hand       right index & thumb, radial a. clot?            PAST SURGICAL HISTORY:                  Past Surgical History         Past Surgical History:   Procedure Laterality Date     BIOPSY BREAST Right       BREAST BIOPSY, CORE RT/LT         benign     CONIZATION   1991     JENNIFER III     LASER TX, CERVICAL   12/23/91     Laser TX Cervical JENNIFER 3 neg margins, no invasion     LUMPECTOMY BREAST         benign     SPLENECTOMY         SPLENECTOMY, TOTAL   1984     ITP     WISDOM   [facility]                CURRENT MEDICATIONS:                  Current Outpatient Prescriptions          Current Outpatient Medications   Medication Sig Dispense Refill     albuterol (PROAIR HFA/PROVENTIL HFA/VENTOLIN HFA) 108 (90 Base) MCG/ACT inhaler Inhale 2 puffs into the lungs every 6 hours         apixaban ANTICOAGULANT (ELIQUIS) 5 MG tablet Take 1 tablet (5 mg) by mouth 2 times daily 180 tablet 1     calcium carbonate (OS-ROBERT) 600 MG tablet Take by mouth daily          fluticasone (FLOVENT HFA) 110 MCG/ACT inhaler Inhale 1 puff into the lungs 2 times daily as needed          Multiple Vitamins-Minerals (MULTIVITAMIN ADULTS PO)           raloxifene (EVISTA) 60 MG tablet Take 1 tablet (60 mg) by mouth in the morning. 90 tablet 3     vitamin C (ASCORBIC ACID) 250 MG tablet Take 250 mg by mouth daily         VITAMIN D PO Take 2,000 Units by mouth daily                ALLERGIES:                        Allergies   Allergen Reactions     Fosamax [Alendronic Acid]         Elevated liver enzymes      Other Allergy (See Comments) [External Allergen Needs Reconciliation - See Comment] Hives and Swelling       Horse      Seasonal Allergies           SOCIAL HISTORY:                  Social History   Social History      Socioeconomic History     Marital status:        Spouse name: Not on file     Number of children: 2     Years of education: Not on file     Highest education level: Not on file   Occupational History     Occupation: infusion specialist       Comment: [employer]   Tobacco Use     Smoking status: Never Smoker     Smokeless tobacco: Never Used   Vaping Use     Vaping Use: Never used   Substance and Sexual Activity     Alcohol use: Yes     Drug use: No     Sexual activity: Not Currently       Partners: Male       Birth control/protection: Post-menopausal       Comment: vasectomy   Other Topics Concern     Parent/sibling w/ CABG, MI or angioplasty before 65F 55M? Not Asked   Social History  Narrative     She had a child in  and a second in . She had her first grandchild in .       Social Determinants of Health      Financial Resource Strain: Not on file   Food Insecurity: Not on file   Transportation Needs: Not on file   Physical Activity: Not on file   Stress: Not on file   Social Connections: Not on file   Intimate Partner Violence: Not on file   Housing Stability: Not on file            FAMILY HISTORY:                   Family History         Family History   Problem Relation Age of Onset     Cancer Mother            of cancer possibly from breast at age 55-60.     Breast Cancer Mother       Cancer Father       Asthma Father       Diabetes Sister       Heart Disease Sister 68         heart attack     Thyroid Disease Sister       Heart Disease Sister            due to heart issues     Heart Disease Sister       Heart Disease Sister           ablations     Thyroid nodules Sister       Diabetes Type 2  Sister       Heart Disease Sister            age 55-60?     No Known Problems Brother       No Known Problems Brother       Skin Cancer Maternal Aunt       Colon Cancer Maternal Aunt                 Physical exam was not undertaken as this was a billable telephone encounter.               Rt radial:                                  2 plus palpable  Rt ulnar:                                  3 plus palpable  Faraz filling time Rt radial:       7seconds  Faraz filling time Rt ulnar:        3 seconds        Lt radial:                                  3 plus palpable  Lt ulnar:                                   3 plus palpable  Faraz filling time Lt radial:        4 seconds  Faraz filling time Lt radial:        3 seconds           US UE ART DOPLR SEG PRESS W/O EXR, 3+ LVL COLIN   2022 3:19 PM      HISTORY: Clot.     COMPARISON: None.     FINDINGS:  Right CARINE:   Radial artery: 154, index of 1.05.  Ulnar artery: 136, index of 0.93     Left CARINE:   Radial artery: 123, index of  0.84.  Ulnar artery: 147, index of 1.0      Waveforms: Both brachial and ulnar waveforms are triphasic. The left  radial waveform is triphasic and the right radial waveform is  triphasic/biphasic.                                                                      IMPRESSION: Arteries in both upper extremities are patent without  suggestion of occlusion or stenosis.     CARINE CRITERIA:  >1.4 NC  0.95-1.4 Normal  0.90 - 0.94 Mild  0.5 - 0.89 Moderate  0.2 - 0.49 Severe  <0.2 Critical     JORGE KIM MD            US UPPER EXTREMITY ARTERIAL DUPLEX RIGHT 1/20/2022 3:19 PM     HISTORY: 61-year-old woman with history of right radial artery  occlusion. Request made for evaluation of degree of  stenosis/occlusion.     COMPARISON: None     TECHNIQUE: Color Doppler and spectral waveform analysis obtained  throughout the arteries of the right upper extremity.     FINDINGS: The subclavian artery is patent with diameters ranging from  6.9 and 8.9 mm. No velocity elevation to suggest stenosis. The  axillary artery is 5.8 mm with velocity of 78 cm/second. The brachial  artery is 3.5 to 4.4 mm with normal velocities of 103-116 cm/second.  The ulnar artery is 2.5 to 3.4 mm with normal velocities. The radial  artery is 1.9 to 2 mm and patent with triphasic/biphasic waveforms.  Ulnar arterial waveforms are biphasic.                                                                      IMPRESSION:  1. Patent arteries in the right upper extremity.  2. More specifically, the visible right radial artery is patent.     JORGE KIM MD            Component      Latest Ref Rng & Units 1/20/2022 6/22/2022 7/28/2022   D-Dimer Quantitative      0.00 - 0.50 ug/mL FEU <0.27 <=0.27 <=0.27         [facility]  DATE: 6/27/2022     EXAM: RIGHT UPPER EXTREMITY ARTERIAL DUPLEX     INDICATION: Right radial artery thrombus      TECHNIQUE: Duplex imaging was performed utilizing gray-scale,  compression, augmentation as appropriate,  color-flow, Doppler waveform  analysis, and spectral Doppler imaging.     COMPARISON: 1/20/2022.     FINDINGS:      Right upper extremity arteries are widely patent with normal  waveforms. No occlusion seen.                                                                      IMPRESSION:     1.  No evidence of radial artery occlusion. Right upper extremity  arteries appear normal.     CONNIE SOTO MD               A/P:     1-(I82.90) Right radial artery thrombus    Comment:     This was most likely occurring chronically at this point as a result of iatrogenic injury during one of her numerous blood draws during her 6/2021 six day hospitalization at [facility]. She states she endured multiple venous phlebotomies and venous cannulations, but that one in particular was more painful than any of the others. I suspect but will never be able to prove that there may have been inadvertent arterial cannulation with subsequent thrombus formation. Her fingers/thumbs are not acutely threatened at present. Her hand is warm with intact motor and sensation. She has been ACd therapeutically off label (for arterial thrombosis) with Eliquis, which is likely quite reasonable despite off label use given its lower bleeding profile vis a vis warfarin. With that, her imaging of today reveals resolution of thrombus. When last seen, I stated there was no indication for surgical intervention, particularly in light of the fact this had likely at that time been there over six months, was chronic at that point, and had resorbed. I simply recommended six months AC, w/o vasodilatory therapy as she had no pain, but with thermal protection strategies. We recently checked a d dimer and repeat arterial RUE duplex. Those results are notable for the d dimer being undetectable, and the radial artery thrombus still being resorbed. She presents to consider cessation of AC.          Plan:       Stopped AC, checked d dimer on 7/28/22, which was one  month after having stopped AC. D dimer was  Still undetectabe.  Remain off of AC, RTC prn. She has vasospastic sx sonly in the affected right hand. The sxs are not life altering. She was offered LA nitrates or calcium channel blockers, but would  Prefer not to utilize more meds. She is advised to undertake thermal protection strategies in environmentally cooler temperatures she was also advised that we could, if sxs are life altering and prominent undertake a catheter directed angiogram with magnified views of the digital arteries for further diagnostic studies. I did however advise her that in my opinion, in the absence of tissue loss sxs, that the risk of the procedure, although small, exceeds the diagnostic benefit. RTC prn.                 20 minutes total medical care on today's date.

## 2022-08-03 NOTE — PROGRESS NOTES
Crys is a 61 year old who is being evaluated via a billable telephone visit.      What phone number would you like to be contacted at? [phone]  How would you like to obtain your AVS? Miriam George

## 2022-10-06 ENCOUNTER — TRANSFERRED RECORDS (OUTPATIENT)
Dept: HEALTH INFORMATION MANAGEMENT | Facility: CLINIC | Age: 62
End: 2022-10-06

## 2022-10-19 ENCOUNTER — VIRTUAL VISIT (OUTPATIENT)
Dept: ENDOCRINOLOGY | Facility: CLINIC | Age: 62
End: 2022-10-19
Payer: COMMERCIAL

## 2022-10-19 DIAGNOSIS — M81.0 AGE-RELATED OSTEOPOROSIS WITHOUT CURRENT PATHOLOGICAL FRACTURE: Primary | ICD-10-CM

## 2022-10-19 PROCEDURE — 99213 OFFICE O/P EST LOW 20 MIN: CPT | Mod: 95 | Performed by: INTERNAL MEDICINE

## 2022-10-19 NOTE — LETTER
10/19/2022         RE: Crys Quarles  [address] [place] [address]        Dear Colleague,    Thank you for referring your patient, Crys Quarles, to the [facility] [place]. Please see a copy of my visit note below.    Patient is being evaluated via a billable video visit.      How would you like to obtain your AVS? Verbally Reviewed  If the video visit is dropped, the invitation should be resent by: Cellphone  Will anyone else be joining your video visit? No        Video-Visit Details    Video Start Time: 12:04 pm    Type of service:  Video Visit    Video End Time: 12:19 pm    Originating Location (pt. Location): work    PROVIDER LOCATION On-site vs Off-site    Distant Location (provider location):  Home    Platform used for Video Visit: Ottoniel          Recent issues:  Osteoporosis follow-up evaluation  Previous acute illness and [facility] hospitalization late 5/2021, alendronate med discontinued  Subsequently started Raloxifene 8/2021 but symptoms of abdominal bloating and/or pain since 7/2022, Raloxifene discontinued  Restarted Raloxifene use 10/9/22 after the colonoscopy procedure, but no calcium, MVI or  vit D... no subsequent abdominal bloating or pain noted  She now wonders if the abdominal symptoms caused by the higher dose calcium supplement dosing          1982. History of ITP, then Prednisone medication for ~2 yrs duration  Menopause early 50's, no subsequent HRT  Previous medical evaluations with Dr. Bettina Salgado/[facility]    1/23/17 DEXA:   L1-4    T-score:  -0.4    Left fem neck   T-score: -2.0   Right fem neck  T-score: 12.2    12/2020. GYN evaluation with Dr. Bebo Mariscal/[facility]   1/28/21 DEXA:               Lumbar Spine (L1-L4)       T-score:  -0.4, degenerative changes present               Left Femoral Neck             T-score:  -2.5               Right Femoral Neck           T-score:  -2.5    Health history includes:  Bone  fractures:   none  Vit D deficiency:   yes   Kidney stones:   none  Osteoporosis med:   none previously  Supplements:    MVI 1-tab daily       Calcium 600 mg 1-tab daily       Vit C 500 mg 1-tab daily  Previous [facility] labs include:     Lab Test 12/15/20  1534   VITDT 49         4/2/21. Initial osteoporosis evaluation with me at [place]  Reviewed health history and osteoporosis management  Advised starting alendronate medication with weekly dosing  She recalls having some abdominal bloating while on the medication    5/31/21. Hospitalization with cold intolerance, then febrile illness  Subsequently had COVID-19 test which was negative  Went to [facility] ED and admitted  GI symptoms, elevated liver tests  Decision made to stop alendronate medication, last dose 5/28/21  Repeat lab testing after hospitalization, recalls improvement with liver tests  Developed right hand (index & thumb) coldness and numbness, subsequently dx radial artery clot    8/2021. Med change to raloxifene (Evista) 60 mg daily at nighttime  Tolerating medication well without apparent SE's    3/31/22 DEXA:   L1-4    T-score: -0.1   Left fem neck:   T-score: -2.4   Right fem neck:  T-score: -2.4    Recent [facility] labs include:  Lab Results   Component Value Date     08/24/2021    POTASSIUM 4.0 08/24/2021    CHLORIDE 107 08/24/2021    CO2 25 08/24/2021    ANIONGAP 11 08/24/2021     08/24/2021    BUN 18 08/24/2021    CR 0.79 08/24/2021    GFRESTIMATED 82 08/24/2021    GFRESTBLACK >60 06/10/2021    ROBERT 9.3 11/15/2021    TSH 2.91 11/15/2021    VITDT 37 11/15/2021    PTHI 79 11/15/2021         Lives in [place], [place]  Sees Dr. Bebo Mariscal/[facility] for medicine evaluations.  Also sees [facility] Nathaniel Ceja/[facility]    PMH/PSH:  Past Medical History:   Diagnosis Date     Abdominal pain, epigastric 06/02/2021     Age-related osteoporosis without current pathological fracture      Asthma      Asthma      JENNIFER  III (cervical intraepithelial neoplasia grade III) with severe dysplasia 1991    [facility] biopsy     Hyperlipidemia LDL goal <130      Idiopathic thrombocytopenic purpura (H) 1981     Melanotic stools 2021     Mild intermittent asthma without complication 2021     Paresthesias in right hand     right index & thumb, radial a. clot?     Past Surgical History:   Procedure Laterality Date     BIOPSY BREAST Right      BREAST BIOPSY, CORE RT/LT      benign     CONIZATION      JENNIFER III     LASER TX, CERVICAL  91    Laser TX Cervical JENNIFER 3 neg margins, no invasion     LUMPECTOMY BREAST      benign     SPLENECTOMY       SPLENECTOMY, TOTAL      ITP     WISDOM [facility]         Family Hx:  Family History   Problem Relation Age of Onset     Cancer Mother          of cancer possibly from breast at age 55-60.     Breast Cancer Mother      Cancer Father      Asthma Father      Diabetes Sister      Heart Disease Sister 68        heart attack     Thyroid Disease Sister      Heart Disease Sister          due to heart issues     Heart Disease Sister      Heart Disease Sister         ablations     Thyroid nodules Sister      Diabetes Type 2  Sister      Heart Disease Sister          age 55-60?     No Known Problems Brother      No Known Problems Brother      Skin Cancer Maternal Aunt      Colon Cancer Maternal Aunt          Social Hx:  Social History     Socioeconomic History     Marital status:      Spouse name: Not on file     Number of children: 2     Years of education: Not on file     Highest education level: Not on file   Occupational History     Occupation: infusion specialist     Comment: [employer]   Tobacco Use     Smoking status: Never     Smokeless tobacco: Never   Vaping Use     Vaping Use: Never used   Substance and Sexual Activity     Alcohol use: Yes     Comment: holidays     Drug use: No     Sexual activity: Not Currently     Partners: Male     Birth  control/protection: Post-menopausal     Comment: vasectomy   Other Topics Concern     Parent/sibling w/ CABG, MI or angioplasty before 65F 55M? Not Asked   Social History Narrative    She had a child in 1988 and a second in 1991. She had her first grandchild in 2015.      Social Determinants of Health     Financial Resource Strain: Not on file   Food Insecurity: Not on file   Transportation Needs: Not on file   Physical Activity: Not on file   Stress: Not on file   Social Connections: Not on file   Intimate Partner Violence: Not on file   Housing Stability: Not on file          MEDICATIONS:  has a current medication list which includes the following prescription(s): albuterol, fluticasone, raloxifene, calcium carbonate, multiple vitamins-minerals, vitamin c, and vitamin d.    ROS:     ROS: 10 point ROS neg other than the symptoms noted above in the HPI.    GENERAL: mild fatigue, wt stable; denies fevers, chills, night sweats.    HEENT: no dysphagia, odonophagia, diplopia, neck pain  THYROID:  no apparent hyper or hypothyroid symptoms  CV: no chest pain, pressure, palpitations  LUNGS: no SOB, HOLLIDAY, cough, wheezing   ABDOMEN: indigestion, some bloating and constipation; no diarrhea, abdominal pain  EXTREMITIES: no rashes, ulcers, edema  NEUROLOGY: some numbness at right hand index and thumb; no headaches, denies changes in vision, tingling, extremitiy numbness   MSK:  lower back pains; denies muscle weakness  SKIN: no rashes or lesions  : no menses since early 50's, denies hot flashes  PSYCH:  stable mood, no significant anxiety or depression  ENDOCRINE: no heat or cold intolerance    Physical Exam (visual exam)  VS:  no vital signs taken for video visit  CONSTITUTIONAL: healthy, alert and NAD, well dressed, answering questions appropriately  ENT: no nose swelling or nasal discharge, mouth redness or gum changes.  EYES: eyes grossly normal to inspection, conjunctivae and sclerae normal, no exophthalmos or  proptosis  THYROID:  no apparent nodules or goiter  LUNGS: no audible wheeze, cough or visible cyanosis, no visible retractions or increased work of breathing  ABDOMEN: abdomen not evaluated  EXTREMITIES: no hand tremors, limited exam  NEUROLOGY: CN grossly intact, mentation intact and speech normal   SKIN:  no apparent skin lesions, rash, or edema with visualized skin appearance  PSYCH: mentation appears normal, affect normal/bright, judgement and insight intact,   normal speech and appearance well groomed      LABS:    All pertinent notes, labs, and images personally reviewed by me.     A/P:  Encounter Diagnosis   Name Primary?     Age-related osteoporosis without current pathological fracture Yes       Comments:  Reviewed health history and osteoporosis issues.  Risk factors include low estrogen of menopause, previous (short duration) steroid use for ITP, vit D deficiency  Reviewed recent DEXA report information, showing hip BMD mildly improved with raloxifene med plan  Reviewed and interpreted tests   Ordered appropriate tests for the endocrinology disease management.    Management options discussed and implemented after shared medical decision making with the patient.  Osteoporosis problem is chronic-stable    Plan:  Discussed general issues with the osteoporosis diagnosis and management  Reviewed concept of using the DEXA scan imaging to assess bone mineral density (BMD)  Discussed terminology of the T-score   Reviewed osteoporosis medication treatment options    Recommend:  Continue the current raloxifene (Evista) 60 mg daily dose  Discussed options with previous calcium carbonate vs new calcium citrate   Reintroduce MVI and vit D supplements, then monitor for symptom changes for at least 1 week  If tolerated well, restart calcium citrate 1-tab daily then titrate gradually to 2-tabs daily  Avoid heavy lifting and fall injuries  Repeat DEXA scan in 4/2023 for comparison    Cause of back pain unclear, advise  she schedule appt with her PCP  Addressed patient questions today    There are no Patient Instructions on file for this visit.    Future labs ordered today: No orders of the defined types were placed in this encounter.    Radiology/Consults ordered today: None    Total time spent in with the patient evaluation:  15 min  Additional time spent reviewing pertinent lab tests and chart notes, and documentation:  6 min    Follow-up:  5/2023    BLANCA Jessica MD, MS  Endocrinology  [facility]    CC: KASEY Mariscal and ROBIN Ceja          Again, thank you for allowing me to participate in the care of your patient.        Sincerely,        Bebo Jessica MD

## 2022-10-19 NOTE — PROGRESS NOTES
Patient is being evaluated via a billable video visit.      How would you like to obtain your AVS? Verbally Reviewed  If the video visit is dropped, the invitation should be resent by: Cellphone  Will anyone else be joining your video visit? No        Video-Visit Details    Video Start Time: 12:04 pm    Type of service:  Video Visit    Video End Time: 12:19 pm    Originating Location (pt. Location): work    PROVIDER LOCATION On-site vs Off-site    Distant Location (provider location):  Home    Platform used for Video Visit: [employer]          Recent issues:  Osteoporosis follow-up evaluation  [facility], alendronate med discontinued  Subsequently started Raloxifene 8/2021 but symptoms of abdominal bloating and/or pain since 7/2022, Raloxifene discontinued  Restarted Raloxifene use 10/9/22 after the colonoscopy procedure, but no calcium, MVI or  vit D... no subsequent abdominal bloating or pain noted  She now wonders if the abdominal symptoms caused by the higher dose calcium supplement dosing          1982. History of ITP, then Prednisone medication for ~2 yrs duration  Menopause early 50's, no subsequent HRT  Previous medical evaluations with Dr. Bettina Salgado/[facility]    1/23/17 DEXA:   L1-4    T-score:  -0.4    Left fem neck   T-score: -2.0   Right fem neck  T-score: 12.2    12/2020. GYN evaluation with Dr. Bebo Mariscal/[facility]   1/28/21 DEXA:               Lumbar Spine (L1-L4)       T-score:  -0.4, degenerative changes present               Left Femoral Neck             T-score:  -2.5               Right Femoral Neck           T-score:  -2.5    Health history includes:  Bone fractures:   none  Vit D deficiency:   yes   Kidney stones:   none  Osteoporosis med:   none previously  Supplements:    MVI 1-tab daily       Calcium 600 mg 1-tab daily       Vit C 500 mg 1-tab daily  Previous [facility] labs include:     Lab Test 12/15/20  1534   VITDT 49         4/2/21.  Initial osteoporosis evaluation with me at [place]  Reviewed health history and osteoporosis management  Advised starting alendronate medication with weekly dosing  She recalls having some abdominal bloating while on the medication    5/31/21. Hospitalization with cold intolerance, then febrile illness  Subsequently had COVID-19 test which was negative  Went to [facility] ED and admitted  GI symptoms, elevated liver tests  Decision made to stop alendronate medication, last dose 5/28/21  Repeat lab testing after hospitalization, recalls improvement with liver tests  Developed right hand (index & thumb) coldness and numbness, subsequently dx radial artery clot    8/2021. Med change to raloxifene (Evista) 60 mg daily at nighttime  Tolerating medication well without apparent SE's    3/31/22 DEXA:   L1-4    T-score: -0.1   Left fem neck:   T-score: -2.4   Right fem neck:  T-score: -2.4    Recent [facility] labs include:  Lab Results   Component Value Date     08/24/2021    POTASSIUM 4.0 08/24/2021    CHLORIDE 107 08/24/2021    CO2 25 08/24/2021    ANIONGAP 11 08/24/2021     08/24/2021    BUN 18 08/24/2021    CR 0.79 08/24/2021    GFRESTIMATED 82 08/24/2021    GFRESTBLACK >60 06/10/2021    ROBERT 9.3 11/15/2021    TSH 2.91 11/15/2021    VITDT 37 11/15/2021    PTHI 79 11/15/2021         Lives in [place], [place]  Sees Dr. Bebo Mariscal/[facility] for medicine evaluations.  Also sees Dr. Nathaniel Ceja/[facility]    PMH/PSH:  Past Medical History:   Diagnosis Date     Abdominal pain, epigastric 06/02/2021     Age-related osteoporosis without current pathological fracture      Asthma      Asthma      JENNIFER III (cervical intraepithelial neoplasia grade III) with severe dysplasia 01/01/1991    [facility] biopsy     Hyperlipidemia LDL goal <130      Idiopathic thrombocytopenic purpura (H) 1981     Melanotic stools 06/02/2021     Mild intermittent asthma without complication 06/02/2021      Paresthesias in right hand     right index & thumb, radial a. clot?     Past Surgical History:   Procedure Laterality Date     BIOPSY BREAST Right      BREAST BIOPSY, CORE RT/LT      benign     CONIZATION      JENNIFER III     LASER TX, CERVICAL  91    Laser TX Cervical JENNIFER 3 neg margins, no invasion     LUMPECTOMY BREAST      benign     SPLENECTOMY       SPLENECTOMY, TOTAL      ITP     WISDOM ST GUIDEWI         Family Hx:  Family History   Problem Relation Age of Onset     Cancer Mother          of cancer possibly from breast at age 55-60.     Breast Cancer Mother      Cancer Father      Asthma Father      Diabetes Sister      Heart Disease Sister 68        heart attack     Thyroid Disease Sister      Heart Disease Sister          due to heart issues     Heart Disease Sister      Heart Disease Sister         ablations     Thyroid nodules Sister      Diabetes Type 2  Sister      Heart Disease Sister          age 55-60?     No Known Problems Brother      No Known Problems Brother      Skin Cancer Maternal Aunt      Colon Cancer Maternal Aunt          Social Hx:  Social History     Socioeconomic History     Marital status:      Spouse name: Not on file     Number of children: 2     Years of education: Not on file     Highest education level: Not on file   Occupational History     Occupation: infusion specialist     Comment: [employer]   Tobacco Use     Smoking status: Never     Smokeless tobacco: Never   Vaping Use     Vaping Use: Never used   Substance and Sexual Activity     Alcohol use: Yes     Comment: holidays     Drug use: No     Sexual activity: Not Currently     Partners: Male     Birth control/protection: Post-menopausal     Comment: vasectomy   Other Topics Concern     Parent/sibling w/ CABG, MI or angioplasty before 65F 55M? Not Asked   Social History Narrative    She had a child in  and a second in . She had her first grandchild in .      Social  Determinants of Health     Financial Resource Strain: Not on file   Food Insecurity: Not on file   Transportation Needs: Not on file   Physical Activity: Not on file   Stress: Not on file   Social Connections: Not on file   Intimate Partner Violence: Not on file   Housing Stability: Not on file          MEDICATIONS:  has a current medication list which includes the following prescription(s): albuterol, fluticasone, raloxifene, calcium carbonate, multiple vitamins-minerals, vitamin c, and vitamin d.    ROS:     ROS: 10 point ROS neg other than the symptoms noted above in the HPI.    GENERAL: mild fatigue, wt stable; denies fevers, chills, night sweats.    HEENT: no dysphagia, odonophagia, diplopia, neck pain  THYROID:  no apparent hyper or hypothyroid symptoms  CV: no chest pain, pressure, palpitations  LUNGS: no SOB, HOLLIDAY, cough, wheezing   ABDOMEN: indigestion, some bloating and constipation; no diarrhea, abdominal pain  EXTREMITIES: no rashes, ulcers, edema  NEUROLOGY: some numbness at right hand index and thumb; no headaches, denies changes in vision, tingling, extremitiy numbness   MSK:  lower back pains; denies muscle weakness  SKIN: no rashes or lesions  : no menses since early 50's, denies hot flashes  PSYCH:  stable mood, no significant anxiety or depression  ENDOCRINE: no heat or cold intolerance    Physical Exam (visual exam)  VS:  no vital signs taken for video visit  CONSTITUTIONAL: healthy, alert and NAD, well dressed, answering questions appropriately  ENT: no nose swelling or nasal discharge, mouth redness or gum changes.  EYES: eyes grossly normal to inspection, conjunctivae and sclerae normal, no exophthalmos or proptosis  THYROID:  no apparent nodules or goiter  LUNGS: no audible wheeze, cough or visible cyanosis, no visible retractions or increased work of breathing  ABDOMEN: abdomen not evaluated  EXTREMITIES: no hand tremors, limited exam  NEUROLOGY: CN grossly intact, mentation intact and  speech normal   SKIN:  no apparent skin lesions, rash, or edema with visualized skin appearance  PSYCH: mentation appears normal, affect normal/bright, judgement and insight intact,   normal speech and appearance well groomed      LABS:    All pertinent notes, labs, and images personally reviewed by me.     A/P:  Encounter Diagnosis   Name Primary?     Age-related osteoporosis without current pathological fracture Yes       Comments:  Reviewed health history and osteoporosis issues.  Risk factors include low estrogen of menopause, previous (short duration) steroid use for ITP, vit D deficiency  Reviewed recent DEXA report information, showing hip BMD mildly improved with raloxifene med plan  Reviewed and interpreted tests   Ordered appropriate tests for the endocrinology disease management.    Management options discussed and implemented after shared medical decision making with the patient.  Osteoporosis problem is chronic-stable    Plan:  Discussed general issues with the osteoporosis diagnosis and management  Reviewed concept of using the DEXA scan imaging to assess bone mineral density (BMD)  Discussed terminology of the T-score   Reviewed osteoporosis medication treatment options    Recommend:  Continue the current raloxifene (Evista) 60 mg daily dose  Discussed options with previous calcium carbonate vs new calcium citrate   Reintroduce MVI and vit D supplements, then monitor for symptom changes for at least 1 week  If tolerated well, restart calcium citrate 1-tab daily then titrate gradually to 2-tabs daily  Avoid heavy lifting and fall injuries  Repeat DEXA scan in 4/2023 for comparison    Cause of back pain unclear, advise she schedule appt with her PCP  Addressed patient questions today    There are no Patient Instructions on file for this visit.    Future labs ordered today: No orders of the defined types were placed in this encounter.    Radiology/Consults ordered today: None    Total time spent in with  the patient evaluation:  15 min  Additional time spent reviewing pertinent lab tests and chart notes, and documentation:  6 min    Follow-up:  5/2023    BLANCA Jessica MD, MS  Endocrinology  [facility]    CC: KASEY Mariscal and ROBIN Ceja

## 2022-11-15 ENCOUNTER — TELEPHONE (OUTPATIENT)
Dept: OBGYN | Facility: CLINIC | Age: 62
End: 2022-11-15

## 2022-11-15 ENCOUNTER — LAB (OUTPATIENT)
Dept: LAB | Facility: CLINIC | Age: 62
End: 2022-11-15
Payer: COMMERCIAL

## 2022-11-15 DIAGNOSIS — Z20.822 CLOSE EXPOSURE TO 2019 NOVEL CORONAVIRUS: ICD-10-CM

## 2022-11-15 DIAGNOSIS — J45.20 MILD INTERMITTENT REACTIVE AIRWAY DISEASE WITHOUT COMPLICATION: Primary | ICD-10-CM

## 2022-11-15 LAB — SARS-COV-2 RNA RESP QL NAA+PROBE: NEGATIVE

## 2022-11-15 PROCEDURE — U0003 INFECTIOUS AGENT DETECTION BY NUCLEIC ACID (DNA OR RNA); SEVERE ACUTE RESPIRATORY SYNDROME CORONAVIRUS 2 (SARS-COV-2) (CORONAVIRUS DISEASE [COVID-19]), AMPLIFIED PROBE TECHNIQUE, MAKING USE OF HIGH THROUGHPUT TECHNOLOGIES AS DESCRIBED BY CMS-2020-01-R: HCPCS

## 2022-11-15 PROCEDURE — U0005 INFEC AGEN DETEC AMPLI PROBE: HCPCS

## 2022-11-15 NOTE — TELEPHONE ENCOUNTER
Pt calls to request inhaler. Has appt in Feb.    She asks for Ventolin inhaler to use as needed. Asthma sx are sporadic. She gets flare ups in the winter with the cold air.    She states you guys have discussed this at past visit.     Okay to send inhaler?      Nayeli MARCUS RN BSN

## 2022-11-18 RX ORDER — ALBUTEROL SULFATE 90 UG/1
2 AEROSOL, METERED RESPIRATORY (INHALATION) EVERY 6 HOURS
Qty: 18 G | Refills: 1 | Status: SHIPPED | OUTPATIENT
Start: 2022-11-18 | End: 2024-04-24

## 2022-11-21 NOTE — TELEPHONE ENCOUNTER
Left message on answering machine for patient that RX was faxed. To call back if any questions.  Cristiane Kim RN

## 2023-02-08 ENCOUNTER — TELEPHONE (OUTPATIENT)
Dept: ENDOCRINOLOGY | Facility: CLINIC | Age: 63
End: 2023-02-08
Payer: COMMERCIAL

## 2023-02-08 NOTE — TELEPHONE ENCOUNTER
[facility] Call Center    Phone Message    May a detailed message be left on voicemail: yes     Reason for Call: Other: Patient called in and needs to know when her next Bone Density test is needed and when she should schedule her follow up with her provider. Thank you.     Action Taken: Other: Endo    Travel Screening: Not Applicable

## 2023-02-09 ENCOUNTER — TELEPHONE (OUTPATIENT)
Dept: OBGYN | Facility: CLINIC | Age: 63
End: 2023-02-09
Payer: COMMERCIAL

## 2023-02-09 ENCOUNTER — NURSE TRIAGE (OUTPATIENT)
Dept: NURSING | Facility: CLINIC | Age: 63
End: 2023-02-09

## 2023-02-09 ENCOUNTER — VIRTUAL VISIT (OUTPATIENT)
Dept: URGENT CARE | Facility: CLINIC | Age: 63
End: 2023-02-09
Payer: COMMERCIAL

## 2023-02-09 DIAGNOSIS — U07.1 INFECTION DUE TO 2019 NOVEL CORONAVIRUS: Primary | ICD-10-CM

## 2023-02-09 DIAGNOSIS — J45.20 MILD INTERMITTENT ASTHMA WITHOUT COMPLICATION: ICD-10-CM

## 2023-02-09 DIAGNOSIS — M81.0 AGE-RELATED OSTEOPOROSIS WITHOUT CURRENT PATHOLOGICAL FRACTURE: Primary | ICD-10-CM

## 2023-02-09 PROCEDURE — 99213 OFFICE O/P EST LOW 20 MIN: CPT | Mod: CS

## 2023-02-09 NOTE — TELEPHONE ENCOUNTER
Nurse Triage SBAR    Is this a 2nd Level Triage? YES, LICENSED PRACTITIONER REVIEW IS REQUIRED    Situation: Patient calling with Covid positive test this morning .  Consent: not needed    Background: Covid positive this morning and wants to discuss with provider.      Assessment:   * Tested positive today.   Sx began Tuesday 2/7/2023  * Headache, cough, fatigue, raspy throat but denies pain, chest is slightly tight but pt notes she has asthma.  Notes this is different chest tightness than normal. , fever has improved ( yesterday, last temp was 98.9 taken orally this morning  Around 6:45am  * Worst sx is coughing.   * Pt has asthma -  Denies other high risk concerns.    VACCINES:  Pt has had all vaccines and booster (moderna)  - 1st time pt has had Covid.     Protocol Recommended Disposition:   No PCP with [facility]     Recommendation: Advised patient to make appt with Covid Treatment line and to call back with any worsening of sx or new sx.   . Reviewed concerning symptoms and when to call back.       Dorothy Mcfarlane RN [facility] 2/9/2023 9:59 AM    Reason for Disposition    [1] HIGH RISK for severe COVID complications (e.g., weak immune system, age > 64 years, obesity with BMI of 30 or higher, pregnant, chronic lung disease or other chronic medical condition) AND [2] COVID symptoms (e.g., cough, fever)  (Exceptions: Already seen by PCP and no new or worsening symptoms.)    Additional Information    Negative: SEVERE difficulty breathing (e.g., struggling for each breath, speaks in single words)    Negative: Difficult to awaken or acting confused (e.g., disoriented, slurred speech)    Negative: Bluish (or gray) lips or face now    Negative: Shock suspected (e.g., cold/pale/clammy skin, too weak to stand, low BP, rapid pulse)    Negative: Sounds like a life-threatening emergency to the triager    Negative: [1] Diagnosed or suspected COVID-19 AND [2] symptoms lasting 3 or more weeks    Negative: [1]  COVID-19 exposure AND [2] no symptoms    Negative: COVID-19 vaccine reaction suspected (e.g., fever, headache, muscle aches) occurring 1 to 3 days after getting vaccine    Negative: COVID-19 vaccine, questions about    Negative: [1] Lives with someone known to have influenza (flu test positive) AND [2] flu-like symptoms (e.g., cough, runny nose, sore throat, SOB; with or without fever)    Negative: [1] Adult with possible COVID-19 symptoms AND [2] triager concerned about severity of symptoms or other causes    Negative: COVID-19 and breastfeeding, questions about    Negative: SEVERE or constant chest pain or pressure  (Exception: Mild central chest pain, present only when coughing.)    Negative: MODERATE difficulty breathing (e.g., speaks in phrases, SOB even at rest, pulse 100-120)    Negative: Headache and stiff neck (can't touch chin to chest)    Negative: Oxygen level (e.g., pulse oximetry) 90 percent or lower    Negative: Chest pain or pressure  (Exception: MILD central chest pain, present only when coughing)    Negative: Patient sounds very sick or weak to the triager    Negative: MILD difficulty breathing (e.g., minimal/no SOB at rest, SOB with walking, pulse <100)    Negative: Fever > 103 F (39.4 C)    Negative: [1] Fever > 101 F (38.3 C) AND [2] over 60 years of age    Negative: [1] Fever > 100.0 F (37.8 C) AND [2] bedridden (e.g., nursing home patient, CVA, chronic illness, recovering from surgery)    Protocols used: CORONAVIRUS (COVID-19) DIAGNOSED OR [id number]

## 2023-02-09 NOTE — PROGRESS NOTES
"  Crys Quarles is a 62 year old female who is being evaluated via a billable telephone visit.      The patient has been notified of following:     \"This telephone visit will be conducted via a phone call between you and your physician/provider. We have found that certain health care needs can be provided without the need for a physical exam.  This service lets us provide the care you need with a phone conversation.  If a prescription is necessary we can send it directly to your pharmacy.  If lab work is needed we can place an order for that and you can then stop by our lab to have the test done at a later time.\"     Patient has given verbal consent for telephone visit?  Yes    SUBJECTIVE:  Crys Quarles is an 62 year old female who presents for covid.  sxs started 2 days ago.  Had some chills.  Then today covid test was positive.  Has headache.  Sore throat and cough also.  Ibuprofen helps a little.  Temps up in 101.  Throat not hurt unless coughs.  Has some runny nose.  No n/v/d.  Has been vaccinated for covid.    PMH:   has a past medical history of Abdominal pain, epigastric (06/02/2021), Age-related osteoporosis without current pathological fracture, Asthma, Asthma, JENNIFER III (cervical intraepithelial neoplasia grade III) with severe dysplasia (01/01/1991), Hyperlipidemia LDL goal <130, Idiopathic thrombocytopenic purpura (H) (1981), Melanotic stools (06/02/2021), Mild intermittent asthma without complication (06/02/2021), and Paresthesias in right hand.  Patient Active Problem List   Diagnosis     Open Wound Of The Right Little Finger     Arthralgia     Skin: A Rash     Thrombocytosis     Vitamin D deficiency     Transaminitis     Elevated liver enzymes     Abdominal pain, epigastric     Melanotic stools     Mild intermittent asthma without complication     Systemic viral illness     Protein-calorie malnutrition (H)     Mild persistent asthma with exacerbation     Bilateral hearing loss, " unspecified hearing loss type     Social History     Socioeconomic History     Marital status:      Number of children: 2   Occupational History     Occupation: infusion specialist     Comment: [employer]   Tobacco Use     Smoking status: Never     Smokeless tobacco: Never   Vaping Use     Vaping Use: Never used   Substance and Sexual Activity     Alcohol use: Yes     Comment: holidays     Drug use: No     Sexual activity: Not Currently     Partners: Male     Birth control/protection: Post-menopausal     Comment: vasectomy   Social History Narrative    She had a child in  and a second in . She had her first grandchild in .      Family History   Problem Relation Age of Onset     Cancer Mother          of cancer possibly from breast at age 55-60.     Breast Cancer Mother      Cancer Father      Asthma Father      Diabetes Sister      Heart Disease Sister 68        heart attack     Thyroid Disease Sister      Heart Disease Sister          due to heart issues     Heart Disease Sister      Heart Disease Sister         ablations     Thyroid nodules Sister      Diabetes Type 2  Sister      Heart Disease Sister          age 55-60?     No Known Problems Brother      No Known Problems Brother      Skin Cancer Maternal Aunt      Colon Cancer Maternal Aunt        ALLERGIES:  Fosamax [alendronic acid], Other allergy (see comments) [external allergen needs reconciliation - see comment], and Seasonal allergies    Current Outpatient Medications   Medication     albuterol (PROAIR HFA/PROVENTIL HFA/VENTOLIN HFA) 108 (90 Base) MCG/ACT inhaler     calcium carbonate (OS-ROBERT) 600 MG tablet     fluticasone (FLOVENT HFA) 110 MCG/ACT inhaler     Multiple Vitamins-Minerals (MULTIVITAMIN ADULTS PO)     raloxifene (EVISTA) 60 MG tablet     vitamin C (ASCORBIC ACID) 250 MG tablet     VITAMIN D PO     No current facility-administered medications for this visit.         ROS:  ROS is done and is negative for  general/constitutional, eye, ENT, Respiratory, cardiovascular, GI, , Skin, musculoskeletal except as noted elsewhere.  All other review of systems negative except as noted elsewhere.      OBJECTIVE:    No vital signs taken as is virtual visit.  GENERAL : Alert and oriented.  Did not seem to be in distress.   RESP: No respiratory distress detected during phone conversation           ASSESSMENT/PLAN:    ASSESSMENT / PLAN:  (U07.1) Infection due to 2019 novel coronavirus  (primary encounter diagnosis)  Comment: sxs and positive test.  Has risk factors for complications.  Discussed tx options and will tx with paxlovid.  Pt reports no hx of kidney problems  Plan: nirmatrelvir and ritonavir (PAXLOVID) therapy         pack        Reviewed medication instructions and side effects. Follow up if experiences side effects.. I reviewed supportive care, otc meds to use if needed, expected course, and signs of concern.  Follow up as needed or if she does not improve within 5 day(s) or if worsens in any way.  Reviewed red flag symptoms and is to go to the ER if experiences any of these.    (J45.20) Mild intermittent asthma without complication  Comment: has hx of.  Increases risk of covid complications.  Plan: treat covid as above.  Advised to use albuterol prn for cough or shortness of breath.  Also if sxs are not improving or feels her asthma is triggered, is to use her flovent inhaler also.          See [facility] for orders, medications, letters, patient instructions    Carol Munoz MD  2/9/2023, 3:27 PM      Phone call duration:  22 minutes

## 2023-02-09 NOTE — TELEPHONE ENCOUNTER
Patient calling:  Tested + for Covid today,  Symptoms started yesterday.  Temp, congestion  symptomatic treatment reviewed.  # given to call to schedule appt for antiviral medication.  Advised to go to ER if any difficulty breathing.  Cristiane Kim RN

## 2023-02-10 NOTE — TELEPHONE ENCOUNTER
Message reviewed. As indicated in my last progress note (plan section), I advise patient have a followup DEXA scan in 4/2023.  This scan should be done at the [facility] ([facility] radiology dept.  I have placed this procedure order and she can call [phone] to schedule scan.    Please relay message, plan.  Thanks.    BLANCA Jessica MD, MS  Endocrinology  [facility]

## 2023-02-28 ENCOUNTER — HOSPITAL ENCOUNTER (OUTPATIENT)
Dept: MAMMOGRAPHY | Facility: CLINIC | Age: 63
Discharge: HOME | End: 2023-02-28
Attending: OBSTETRICS & GYNECOLOGY | Admitting: OBSTETRICS & GYNECOLOGY
Payer: COMMERCIAL

## 2023-02-28 ENCOUNTER — OFFICE VISIT (OUTPATIENT)
Dept: OBGYN | Facility: CLINIC | Age: 63
End: 2023-02-28
Payer: COMMERCIAL

## 2023-02-28 VITALS — WEIGHT: 128 LBS | BODY MASS INDEX: 21.97 KG/M2 | SYSTOLIC BLOOD PRESSURE: 120 MMHG | DIASTOLIC BLOOD PRESSURE: 60 MMHG

## 2023-02-28 DIAGNOSIS — Z12.4 SCREENING FOR MALIGNANT NEOPLASM OF CERVIX: ICD-10-CM

## 2023-02-28 DIAGNOSIS — Z12.31 VISIT FOR SCREENING MAMMOGRAM: ICD-10-CM

## 2023-02-28 DIAGNOSIS — D69.3 IDIOPATHIC THROMBOCYTOPENIC PURPURA (H): ICD-10-CM

## 2023-02-28 DIAGNOSIS — Z01.419 ENCOUNTER FOR GYNECOLOGICAL EXAMINATION WITHOUT ABNORMAL FINDING: ICD-10-CM

## 2023-02-28 DIAGNOSIS — M81.0 AGE-RELATED OSTEOPOROSIS WITHOUT CURRENT PATHOLOGICAL FRACTURE: Primary | ICD-10-CM

## 2023-02-28 DIAGNOSIS — G93.31 POSTVIRAL FATIGUE SYNDROME: ICD-10-CM

## 2023-02-28 PROBLEM — D75.839 THROMBOCYTOSIS: Status: ACTIVE | Noted: 2018-10-22

## 2023-02-28 PROBLEM — E55.9 VITAMIN D DEFICIENCY: Status: ACTIVE | Noted: 2018-10-22

## 2023-02-28 LAB
BASOPHILS # BLD AUTO: 0.1 10E3/UL (ref 0–0.2)
BASOPHILS NFR BLD AUTO: 1 %
EOSINOPHIL # BLD AUTO: 0.6 10E3/UL (ref 0–0.7)
EOSINOPHIL NFR BLD AUTO: 6 %
ERYTHROCYTE [DISTWIDTH] IN BLOOD BY AUTOMATED COUNT: 14.4 % (ref 10–15)
HCT VFR BLD AUTO: 40.8 % (ref 35–47)
HGB BLD-MCNC: 13.4 G/DL (ref 11.7–15.7)
IMM GRANULOCYTES # BLD: 0 10E3/UL
IMM GRANULOCYTES NFR BLD: 0 %
LYMPHOCYTES # BLD AUTO: 4.4 10E3/UL (ref 0.8–5.3)
LYMPHOCYTES NFR BLD AUTO: 44 %
MCH RBC QN AUTO: 29 PG (ref 26.5–33)
MCHC RBC AUTO-ENTMCNC: 32.8 G/DL (ref 31.5–36.5)
MCV RBC AUTO: 88 FL (ref 78–100)
MONOCYTES # BLD AUTO: 0.9 10E3/UL (ref 0–1.3)
MONOCYTES NFR BLD AUTO: 9 %
NEUTROPHILS # BLD AUTO: 4.1 10E3/UL (ref 1.6–8.3)
NEUTROPHILS NFR BLD AUTO: 40 %
PLATELET # BLD AUTO: 521 10E3/UL (ref 150–450)
RBC # BLD AUTO: 4.62 10E6/UL (ref 3.8–5.2)
WBC # BLD AUTO: 10 10E3/UL (ref 4–11)

## 2023-02-28 PROCEDURE — 87624 HPV HI-RISK TYP POOLED RSLT: CPT | Performed by: OBSTETRICS & GYNECOLOGY

## 2023-02-28 PROCEDURE — 77067 SCR MAMMO BI INCL CAD: CPT

## 2023-02-28 PROCEDURE — G0123 SCREEN CERV/VAG THIN LAYER: HCPCS | Performed by: OBSTETRICS & GYNECOLOGY

## 2023-02-28 PROCEDURE — 99212 OFFICE O/P EST SF 10 MIN: CPT | Mod: 25 | Performed by: OBSTETRICS & GYNECOLOGY

## 2023-02-28 PROCEDURE — 36415 COLL VENOUS BLD VENIPUNCTURE: CPT | Performed by: OBSTETRICS & GYNECOLOGY

## 2023-02-28 PROCEDURE — 99396 PREV VISIT EST AGE 40-64: CPT | Performed by: OBSTETRICS & GYNECOLOGY

## 2023-02-28 PROCEDURE — 82306 VITAMIN D 25 HYDROXY: CPT | Performed by: OBSTETRICS & GYNECOLOGY

## 2023-02-28 PROCEDURE — 80050 GENERAL HEALTH PANEL: CPT | Performed by: OBSTETRICS & GYNECOLOGY

## 2023-02-28 NOTE — NURSING NOTE
"Chief Complaint   Patient presents with     Physical       Initial /60   Wt 58.1 kg (128 lb)   LMP 2008   BMI 21.97 kg/m   Estimated body mass index is 21.97 kg/m  as calculated from the following:    Height as of 22: 1.626 m (5' 4\").    Weight as of this encounter: 58.1 kg (128 lb).  BP completed using cuff size: regular    Questioned patient about current smoking habits.  Pt. has never smoked.          The following HM Due: NONE      The following patient reported/Care Every where data was sent to:  P ABSTRACT QUALITY INITIATIVES [[id number]]        [facility]                 "

## 2023-02-28 NOTE — PATIENT INSTRUCTIONS
You can reach your [facility] Team any time of the day by calling [phone]. This number will put you in touch with the 24 hour nurse line if the clinic is closed.    To contact your OB/GYN Station Coordinator/Surgery Scheduler please call [phone]. This is a direct number for your care team between 8 a.m. and 4 p.m. Monday through Friday.    [facility] is open for your convenience:  Monday through Friday 8 a.m. to 6 p.m.  Closed weekends and all major holidays.

## 2023-02-28 NOTE — LETTER
March 1, 2023      Crys NAQVI Pipe Quarles  [address] [facility] [address]        Dear Ms.Kurhajec Quarles,    We are writing to inform you of your test results.    {results letter list:[id number]}    Resulted Orders   CBC with platelets and differential   Result Value Ref Range    WBC Count 10.0 4.0 - 11.0 10e3/uL    RBC Count 4.62 3.80 - 5.20 10e6/uL    Hemoglobin 13.4 11.7 - 15.7 g/dL    Hematocrit 40.8 35.0 - 47.0 %    MCV 88 78 - 100 fL    MCH 29.0 26.5 - 33.0 pg    MCHC 32.8 31.5 - 36.5 g/dL    RDW 14.4 10.0 - 15.0 %    Platelet Count 521 (H) 150 - 450 10e3/uL    % Neutrophils 40 %    % Lymphocytes 44 %    % Monocytes 9 %    % Eosinophils 6 %    % Basophils 1 %    % Immature Granulocytes 0 %    Absolute Neutrophils 4.1 1.6 - 8.3 10e3/uL    Absolute Lymphocytes 4.4 0.8 - 5.3 10e3/uL    Absolute Monocytes 0.9 0.0 - 1.3 10e3/uL    Absolute Eosinophils 0.6 0.0 - 0.7 10e3/uL    Absolute Basophils 0.1 0.0 - 0.2 10e3/uL    Absolute Immature Granulocytes 0.0 <=0.4 10e3/uL       If you have any questions or concerns, please call the clinic at the number listed above.       Sincerely,      Bebo Mariscal MD

## 2023-03-01 LAB
ALBUMIN SERPL BCG-MCNC: 4.1 G/DL (ref 3.5–5.2)
ALP SERPL-CCNC: 62 U/L (ref 35–104)
ALT SERPL W P-5'-P-CCNC: 13 U/L (ref 10–35)
ANION GAP SERPL CALCULATED.3IONS-SCNC: 12 MMOL/L (ref 7–15)
AST SERPL W P-5'-P-CCNC: 33 U/L (ref 10–35)
BILIRUB SERPL-MCNC: 0.2 MG/DL
BUN SERPL-MCNC: 17 MG/DL (ref 8–23)
CALCIUM SERPL-MCNC: 9.7 MG/DL (ref 8.8–10.2)
CHLORIDE SERPL-SCNC: 102 MMOL/L (ref 98–107)
CREAT SERPL-MCNC: 0.7 MG/DL (ref 0.51–0.95)
DEPRECATED CALCIDIOL+CALCIFEROL SERPL-MC: 81 UG/L (ref 20–75)
DEPRECATED HCO3 PLAS-SCNC: 25 MMOL/L (ref 22–29)
GFR SERPL CREATININE-BSD FRML MDRD: >90 ML/MIN/1.73M2
GLUCOSE SERPL-MCNC: 83 MG/DL (ref 70–99)
POTASSIUM SERPL-SCNC: 4.6 MMOL/L (ref 3.4–5.3)
PROT SERPL-MCNC: 7.1 G/DL (ref 6.4–8.3)
SODIUM SERPL-SCNC: 139 MMOL/L (ref 136–145)
TSH SERPL DL<=0.005 MIU/L-ACNC: 3.07 UIU/ML (ref 0.3–4.2)

## 2023-03-01 NOTE — PROGRESS NOTES
HPI:  Crys Quarles is a 62 year old white female  ,vasectomy and menopause for contraception who presents for an annual exam and pap.  She is she is presently doing well.  Her previous right hand paresthesia is improved.  There is no motor weakness but still some mild sensory loss.  The patient recently had COVID and is experiencing some fatigue still.  Patient is asking about further evaluation be undertaken.  Patient has a history of ITP.  She has a follow-up DEXA scan scheduled.  The patient follows with endocrinology for osteoporosis without pathologic fracture.  The patient had a mammogram done today and the results are pending.  I also reviewed her lipid panel from 2018.  Self breast exam,  ACS screening mammogram recs, the use of 81 mg ASA to decrease the risk of heart disease, lipid screening, colon cancer screening recs and Dexa scan recs thoroughly reveiwed.      Past Medical History:   Diagnosis Date     Abdominal pain, epigastric 2021     Age-related osteoporosis without current pathological fracture      Asthma      Asthma      JENNIFER III (cervical intraepithelial neoplasia grade III) with severe dysplasia 1991    [facility] biopsy     Hyperlipidemia LDL goal <130      Idiopathic thrombocytopenic purpura (H)      Melanotic stools 2021     Mild intermittent asthma without complication 2021     Paresthesias in right hand     right index & thumb, radial a. clot?     Past Surgical History:   Procedure Laterality Date     BIOPSY BREAST Right      BREAST BIOPSY, CORE RT/LT      benign     CONIZATION      JENNIFER III     LASER TX, CERVICAL  91    Laser TX Cervical JENNIFER 3 neg margins, no invasion     LUMPECTOMY BREAST      benign     SPLENECTOMY       SPLENECTOMY, TOTAL      ITP     [facility]       Family History   Problem Relation Age of Onset     Cancer Mother          of cancer possibly from breast at age 55-60.     Breast Cancer Mother      Cancer  Father      Asthma Father      Diabetes Sister      Heart Disease Sister 68        heart attack     Thyroid Disease Sister      Heart Disease Sister          due to heart issues     Heart Disease Sister      Heart Disease Sister         ablations     Thyroid nodules Sister      Diabetes Type 2  Sister      Heart Disease Sister          age 55-60?     No Known Problems Brother      No Known Problems Brother      Skin Cancer Maternal Aunt      Colon Cancer Maternal Aunt      Social History     Socioeconomic History     Marital status:      Spouse name: Not on file     Number of children: 2     Years of education: Not on file     Highest education level: Not on file   Occupational History     Occupation: infusion specialist     Comment: [employer]   Tobacco Use     Smoking status: Never     Smokeless tobacco: Never   Vaping Use     Vaping Use: Never used   Substance and Sexual Activity     Alcohol use: Yes     Comment: holidays     Drug use: No     Sexual activity: Not Currently     Partners: Male     Birth control/protection: Post-menopausal     Comment: vasectomy   Other Topics Concern     Parent/sibling w/ CABG, MI or angioplasty before 65F 55M? Not Asked   Social History Narrative    She had a child in  and a second in . She had her first grandchild in .      Social Determinants of Health     Financial Resource Strain: Not on file   Food Insecurity: Not on file   Transportation Needs: Not on file   Physical Activity: Not on file   Stress: Not on file   Social Connections: Not on file   Intimate Partner Violence: Not on file   Housing Stability: Not on file       Allergies:  Fosamax [alendronic acid], Other allergy (see comments) [external allergen needs reconciliation - see comment], and Seasonal allergies    Current Outpatient Medications   Medication Sig Dispense Refill     albuterol (PROAIR HFA/PROVENTIL HFA/VENTOLIN HFA) 108 (90 Base) MCG/ACT inhaler Inhale 2 puffs into the  lungs every 6 hours 18 g 1     calcium carbonate (OS-ROBERT) 600 MG tablet Take by mouth daily       fluticasone (FLOVENT HFA) 110 MCG/ACT inhaler Inhale 1 puff into the lungs 2 times daily as needed        Multiple Vitamins-Minerals (MULTIVITAMIN ADULTS PO)        raloxifene (EVISTA) 60 MG tablet Take 1 tablet (60 mg) by mouth in the morning. 90 tablet 3     vitamin C (ASCORBIC ACID) 250 MG tablet Take 250 mg by mouth daily       VITAMIN D PO Take 2,000 Units by mouth daily         ROS: ROS: 10 point ROS neg other than the symptoms noted above in the HPI.    EXAM:  Vitals: /60   Wt 58.1 kg (128 lb)   LMP 03/06/2008   BMI 21.97 kg/m    BMI= Body mass index is 21.97 kg/m .  Constitutional: healthy, alert and no distress  Head: Normocephalic. No masses, lesions, tenderness or abnormalities  Neck: Neck supple. No adenopathy. Thyroid symmetric, normal size,, Carotids without bruits.  ENT: NEGATIVE for ear, mouth and throat problems  Breast:  breasts symmetric, no dominant or suspicious mass, no skin or nipple changes, no axillary adenopathy, unchanged from previous exam or self exam in taught and encouraged  Cardiovascular: negative, PMI normal. No lifts, heaves, or thrills. RRR. No murmurs, clicks gallops or rub  Respiratory: negative, Percussion normal. Good diaphragmatic excursion. Lungs clear  Gastrointestinal: Abdomen soft, non-tender. BS normal. No masses, organomegaly  Genitourinary: Pelvic Exam:  External Genitalia:     Normal appearance for age, no discharge present, no tenderness present, no inflammatory lesions present, color normal  Vagina:     Normal vaginal vault without central or paravaginal defects, no discharge present, no inflammatory lesions present, no masses present  Bladder:     Nontender to palpation  Urethra:   Urethral Body:  Urethra palpation normal, urethra structural support normal   Urethral Meatus:  No erythema or lesions present  Cervix:     Appearance healthy, no lesions present,  nontender to palpation, no bleeding present  Uterus:     Uterus: firm, normal sized and nontender, midplane in position.   Adnexa:     No adnexal tenderness present, no adnexal masses present  Perineum:     Perineum within normal limits, no evidence of trauma, no rashes or skin lesions present  Anus:     Anus within normal limits, no hemorrhoids present  Inguinal Lymph Nodes:     No lymphadenopathy present  Pubic Hair:     Normal pubic hair distribution for age  Genitalia and Groin:     No rashes present, no lesions present, no areas of discoloration, no masses present    Musculoskeletalextremities normal- no gross deformities noted, gait normal and normal muscle tone  Integument: no suspicious lesions or rashes  Neurologic: Gait normal. Reflexes normal and symmetric. Sensation grossly WNL.  Psychiatric: mentation appears normal and affect normal/bright  Hematologic/Lymphatic/Immunologic: Normal cervical lymph nodes     ASSESSMENT:/PLAN:  (M81.0) Age-related osteoporosis without current pathological fracture  (primary encounter diagnosis)  Comment: I reviewed her previous lab work-up and her past history of osteoporosis and ITP.  We will get the following labs.  Plan: CBC with platelets and differential,         Comprehensive metabolic panel (BMP + Alb, Alk         Phos, ALT, AST, Total. Bili, TP), Vitamin D         Deficiency        Appropriate therapy based on results    (Z01.419) Encounter for gynecological examination without abnormal finding  Comment: Other than above health issues unremarkable GYN exam.  In light of her past history of cervical atypia repeat Pap smear was done today.  Recommendations reviewed  Plan:.Done    (G93.31) Postviral fatigue syndrome  Comment: While there may be residual COVID effect I will check thyroid functions for completeness  Plan: TSH with free T4 reflex        Ordered    (Z12.4) Screening for malignant neoplasm of cervix  Comment: Past history and recommendations  reviewed  Plan: Pap screen with HPV - recommended age 30 - 65         years        Done    (D69.3) Idiopathic thrombocytopenic purpura (H)  Comment: In light of her fatigue we will check a CBC and hemoglobin in light of her ITP will check platelet count  Plan: Ordered with appropriate there based on results      Bebo Mariscal M.D.

## 2023-03-03 NOTE — RESULT ENCOUNTER NOTE
Please notify pt of acceptable results she can stop any vitamin D supplementation.  I have reviewed her platelet count.  She had a previous splenectomy done for treatment for ITP  Bebo Mariscal M.D.

## 2023-03-06 LAB
BKR LAB AP GYN ADEQUACY: NORMAL
BKR LAB AP GYN INTERPRETATION: NORMAL
BKR LAB AP HPV REFLEX: NORMAL
BKR LAB AP PREVIOUS ABNORMAL: NORMAL
PATH REPORT.COMMENTS IMP SPEC: NORMAL
PATH REPORT.COMMENTS IMP SPEC: NORMAL
PATH REPORT.RELEVANT HX SPEC: NORMAL

## 2023-03-08 LAB
HUMAN PAPILLOMA VIRUS 16 DNA: NEGATIVE
HUMAN PAPILLOMA VIRUS 18 DNA: NEGATIVE
HUMAN PAPILLOMA VIRUS FINAL DIAGNOSIS: NORMAL
HUMAN PAPILLOMA VIRUS OTHER HR: NEGATIVE

## 2023-04-06 ENCOUNTER — ANCILLARY PROCEDURE (OUTPATIENT)
Dept: BONE DENSITY | Facility: CLINIC | Age: 63
End: 2023-04-06
Attending: INTERNAL MEDICINE
Payer: COMMERCIAL

## 2023-04-06 DIAGNOSIS — M81.0 AGE-RELATED OSTEOPOROSIS WITHOUT CURRENT PATHOLOGICAL FRACTURE: ICD-10-CM

## 2023-04-06 PROCEDURE — 77080 DXA BONE DENSITY AXIAL: CPT | Performed by: INTERNAL MEDICINE

## 2023-05-23 ENCOUNTER — TRANSFERRED RECORDS (OUTPATIENT)
Dept: HEALTH INFORMATION MANAGEMENT | Facility: CLINIC | Age: 63
End: 2023-05-23
Payer: COMMERCIAL

## 2023-05-23 DIAGNOSIS — M81.0 AGE-RELATED OSTEOPOROSIS WITHOUT CURRENT PATHOLOGICAL FRACTURE: ICD-10-CM

## 2023-05-23 RX ORDER — RALOXIFENE HYDROCHLORIDE 60 MG/1
60 TABLET, FILM COATED ORAL DAILY
Qty: 90 TABLET | Refills: 0 | Status: SHIPPED | OUTPATIENT
Start: 2023-05-23 | End: 2023-05-25

## 2023-05-23 NOTE — TELEPHONE ENCOUNTER
"Last Written Prescription Date:  4/12/22  Last Fill Quantity: 90,  # refills: 3   Last office visit: 10/19/22  Future Office Visit:  5/25/23        Requested Prescriptions   Pending Prescriptions Disp Refills     raloxifene (EVISTA) 60 MG tablet [Pharmacy Med Name: Raloxifene HCl Oral Tablet 60 MG] 90 tablet 0     Sig: Take 1 tablet (60 mg) by mouth in the morning.       Bisphosphonates Passed - 5/23/2023  2:00 AM        Passed - Recent (12 mo) or future (30 days) visit within the authorizing provider's specialty     Patient has had an office visit with the authorizing provider or a provider within the authorizing providers department within the previous 12 mos or has a future within next 30 days. See \"Patient Info\" tab in inbasket, or \"Choose Columns\" in Meds & Orders section of the refill encounter.              Passed - Dexa on file within past 2 years     Please review last Dexa result.           Passed - Medication is active on med list        Passed - Patient is age 18 or older        Passed - Normal serum creatinine on file within past 12 months     Recent Labs   Lab Test 02/28/23  1653   CR 0.70       Ok to refill medication if creatinine is low             Refills sent  Uyen Alba RN    "

## 2023-05-25 ENCOUNTER — VIRTUAL VISIT (OUTPATIENT)
Dept: ENDOCRINOLOGY | Facility: CLINIC | Age: 63
End: 2023-05-25
Payer: COMMERCIAL

## 2023-05-25 DIAGNOSIS — M81.0 AGE-RELATED OSTEOPOROSIS WITHOUT CURRENT PATHOLOGICAL FRACTURE: ICD-10-CM

## 2023-05-25 PROCEDURE — 99213 OFFICE O/P EST LOW 20 MIN: CPT | Mod: VID | Performed by: INTERNAL MEDICINE

## 2023-05-25 RX ORDER — RALOXIFENE HYDROCHLORIDE 60 MG/1
60 TABLET, FILM COATED ORAL DAILY
Qty: 90 TABLET | Refills: 3 | Status: SHIPPED | OUTPATIENT
Start: 2023-05-25 | End: 2024-07-22

## 2023-05-25 RX ORDER — RALOXIFENE HYDROCHLORIDE 60 MG/1
60 TABLET, FILM COATED ORAL DAILY
Qty: 90 TABLET | Refills: 3 | Status: SHIPPED | OUTPATIENT
Start: 2023-05-25 | End: 2024-03-03

## 2023-05-25 NOTE — PROGRESS NOTES
Virtual Visit Details    Type of service:  Video Visit     Originating Location (pt. Location): Home  Distant Location (provider location):  On-site  Platform used for Video Visit: Ottoniel        Recent issues:  Osteoporosis follow-up evaluation  Previous history using alendronate then discontinued (5/2021), started raloxifene 8/2021 but bloating and intermittent discontinuation (8/2021), then restarted 10/2022  Reviewed previous 4/2023 DEXA scan results today          1982. History of ITP, then Prednisone medication for ~2 yrs duration  Menopause early 50's, no subsequent HRT  Previous medical evaluations with Dr[facility] Bettina Salgado/[facility]    1/23/17 DEXA:   L1-4    T-score:  -0.4    Left fem neck   T-score: -2.0   Right fem neck  T-score: 12.2    12/2020. GYN evaluation with Dr. Bebo Mariscal/ [place]    1/28/21 DEXA:               Lumbar Spine (L1-L4)       T-score:  -0.4, degenerative changes present               Left Femoral Neck             T-score:  -2.5               Right Femoral Neck           T-score:  -2.5    Health history includes:  Bone fractures:   none  Vit D deficiency:   yes   Kidney stones:   none  Osteoporosis med:   none previously  Supplements:    MVI 1-tab daily       Calcium 600 mg 1-tab daily       Vit C 500 mg 1-tab daily  Previous [facility] labs include:     Lab Test 12/15/20  1534   VITDT 49         4/2/21. Initial osteoporosis evaluation with me at [facility]  Reviewed health history and osteoporosis management  Advised starting alendronate medication with weekly dosing  She recalls having some abdominal bloating while on the medication    5/31/21. Hospitalization with cold intolerance, then febrile illness  Subsequently had COVID-19 test which was negative  Went to [facility] ED and admitted  GI symptoms, elevated liver tests  Decision made to stop alendronate medication, last dose 5/28/21  Repeat lab testing after hospitalization, recalls improvement with liver tests  Developed right  hand (index & thumb) coldness and numbness, subsequently dx radial artery clot    8/2021. Med change to raloxifene (Evista) 60 mg daily at nighttime  Tolerating medication well without apparent SE's    3/31/22 DEXA:   L1-4    T-score: -0.1   Left fem neck:   T-score: -2.4   Right fem neck:  T-score: -2.4  4/6/23 DEXA:   L1-4     T-score:  0.0, marked degenerative changes present    Left fem neck:   T-score:  -2.4    Right fem neck:  T-score:  -2.5    Continues on Citrical-D as 2-tablets BID (total 1300 mg calcium and 2000 U vit daily)   MVI 1-tab daily (includes 1000 U vit D)    Recent [facility] labs include:  Lab Results   Component Value Date     02/28/2023    POTASSIUM 4.6 02/28/2023    CHLORIDE 102 02/28/2023    CO2 25 02/28/2023    ANIONGAP 12 02/28/2023    GLC 83 02/28/2023    BUN 17.0 02/28/2023    CR 0.70 02/28/2023    GFRESTIMATED >90 02/28/2023    GFRESTBLACK >60 06/10/2021    ROBERT 9.7 02/28/2023    TSH 3.07 02/28/2023    VITDT 81 (H) 02/28/2023    PTHI 79 11/15/2021         Lives in [place], [place]  Sees Dr. Bebo Mar[facility]cal/[facility] for medicine evaluations.  Also [facility] Nathaniel Ceja/[facility]    PMH/PSH:  Past Medical History:   Diagnosis Date     Abdominal pain, epigastric 06/02/2021     Age-related osteoporosis without current pathological fracture      Asthma      JENNIFER III (cervical intraepithelial neoplasia grade III) with severe dysplasia 01/01/1991    [facility] biopsy     Hyperlipidemia LDL goal <130      Idiopathic thrombocytopenic purpura (H) 1981     Melanotic stools 06/02/2021     Mild intermittent asthma without complication 06/02/2021     Paresthesias in right hand     right index & thumb, radial a. clot?     Past Surgical History:   Procedure Laterality Date     BIOPSY BREAST Right      BREAST BIOPSY, CORE RT/LT      benign     CONIZATION  1991    JENNIFER III     LASER TX, CERVICAL  12/23/91    Laser TX Cervical JENNIFER 3 neg margins, no invasion     LUMPECTOMY BREAST       benign     SPLENECTOMY       SPLENECTOMY, TOTAL      ITP     [facility]         Family Hx:  Family History   Problem Relation Age of Onset     Cancer Mother          of cancer possibly from breast at age 55-60.     Breast Cancer Mother      Cancer Father      Asthma Father      Diabetes Sister      Heart Disease Sister 68        heart attack     Thyroid Disease Sister      Heart Disease Sister          due to heart issues     Heart Disease Sister      Heart Disease Sister         ablations     Thyroid nodules Sister      Diabetes Type 2  Sister      Heart Disease Sister          age 55-60?     No Known Problems Brother      No Known Problems Brother      Skin Cancer Maternal Aunt      Colon Cancer Maternal Aunt          Social Hx:  Social History     Socioeconomic History     Marital status:      Spouse name: Not on file     Number of children: 2     Years of education: Not on file     Highest education level: Not on file   Occupational History     Occupation: infusion specialist     Comment: [employer]   Tobacco Use     Smoking status: Never     Smokeless tobacco: Never   Vaping Use     Vaping status: Never Used   Substance and Sexual Activity     Alcohol use: Yes     Comment: holidays     Drug use: No     Sexual activity: Not Currently     Partners: Male     Birth control/protection: Post-menopausal     Comment: vasectomy   Other Topics Concern     Parent/sibling w/ CABG, MI or angioplasty before 65F 55M? Not Asked   Social History Narrative    She had a child in  and a second in . She had her first grandchild in 2015.      Social Determinants of Health     Financial Resource Strain: Not on file   Food Insecurity: Not on file   Transportation Needs: Not on file   Physical Activity: Not on file   Stress: Not on file   Social Connections: Not on file   Intimate Partner Violence: Not on file   Housing Stability: Not on file          MEDICATIONS:  has a current  medication list which includes the following prescription(s): albuterol, calcium carbonate, fluticasone, multiple vitamins-minerals, raloxifene, raloxifene, vitamin c, and vitamin d.    ROS:     ROS: 10 point ROS neg other than the symptoms noted above in the HPI.    GENERAL: mild fatigue, wt stable; denies fevers, chills, night sweats.    HEENT: no dysphagia, odonophagia, diplopia, neck pain  THYROID:  no apparent hyper or hypothyroid symptoms  CV: no chest pain, pressure, palpitations  LUNGS: no SOB, HOLLIDAY, cough, wheezing   ABDOMEN: indigestion, some bloating and constipation; no diarrhea, abdominal pain  EXTREMITIES: no rashes, ulcers, edema  NEUROLOGY: some numbness at right hand index and thumb; no headaches, denies changes in vision, tingling, extremitiy numbness   MSK:  lower back pains; denies muscle weakness  SKIN: no rashes or lesions  : no menses since early 50's, denies hot flashes  PSYCH:  stable mood, no significant anxiety or depression  ENDOCRINE: no heat or cold intolerance    Physical Exam (visual exam)  VS:  no vital signs taken for video visit  CONSTITUTIONAL: healthy, alert and NAD, well dressed, answering questions appropriately  ENT: no nose swelling or nasal discharge, mouth redness or gum changes.  EYES: eyes grossly normal to inspection, conjunctivae and sclerae normal, no exophthalmos or proptosis  THYROID:  no apparent nodules or goiter  LUNGS: no audible wheeze, cough or visible cyanosis, no visible retractions or increased work of breathing  ABDOMEN: abdomen not evaluated  EXTREMITIES: no hand tremors, limited exam  NEUROLOGY: CN grossly intact, mentation intact and speech normal   SKIN:  no apparent skin lesions, rash, or edema with visualized skin appearance  PSYCH: mentation appears normal, affect normal/bright, judgement and insight intact,   normal speech and appearance well groomed      LABS:    All pertinent notes, labs, and images personally reviewed by me.     A/P:  Encounter  Diagnosis   Name Primary?     Age-related osteoporosis without current pathological fracture        Comments:  Reviewed health history and osteoporosis issues.  Risk factors include low estrogen of menopause, previous (short duration) steroid use for ITP, vit D deficiency  Reviewed recent DEXA report information, showing hip BMD mildly improved with raloxifene med plan  Reviewed and interpreted tests   Ordered appropriate tests for the endocrinology disease management.    Management options discussed and implemented after shared medical decision making with the patient.  Osteoporosis problem is chronic-stable    Plan:  Discussed general issues with the osteoporosis diagnosis and management  Reviewed concept of using the DEXA scan imaging to assess bone mineral density (BMD)  Discussed terminology of the T-score   Reviewed osteoporosis medication treatment options    Recommend:  Continue the current raloxifene (Evista) 60 mg daily dose... avoid missing doses  Continue the calcium citrate-D supplement plan  Monitor for symptom changes  Avoid heavy lifting and fall injuries  Repeat DEXA scan in 4/2025 for comparison  Follow-up endocrinology evaluation 5/2024 and annually    Cause of back pain unclear, advise she schedule appt with her PCP  Addressed patient questions today    There are no Patient Instructions on file for this visit.    Future labs ordered today: No orders of the defined types were placed in this encounter.    Radiology/Consults ordered today: None    Total time spent on day of encounter:  25 min    Follow-up:  5/2024    BLANCA Jessica MD, MS  Endocrinology  [facility]    CC: KASEY Mariscal

## 2023-05-25 NOTE — LETTER
5/25/2023         RE: Crys Quarles  [address] [facility] [address]        Dear Colleague,    Thank you for referring your patient, Crys Quarles, to the [facility] [place]. Please see a copy of my visit note below.    Virtual Visit Details    Type of service:  Video Visit     Originating Location (pt. Location): Home  Distant Location (provider location):  On-site  Platform used for Video Visit: Ottoniel        Recent issues:  Osteoporosis follow-up evaluation  Previous history using alendronate then discontinued (5/2021), started raloxifene 8/2021 but bloating and intermittent discontinuation (8/2021), then restarted 10/2022  Reviewed previous 4/2023 DEXA scan results today          1982. History of ITP, then Prednisone medication for ~2 yrs duration  Menopause early 50's, no subsequent HRT  Previous medical evaluations with Dr. Bettina Salgado/[facility]    1/23/17 DEXA:   L1-4    T-score:  -0.4    Left fem neck   T-score: -2.0   Right fem neck  T-score: 12.2    12/2020. GYN evaluation with Dr. Bebo Mariscal/[facility]   1/28/21 DEXA:               Lumbar Spine (L1-L4)       T-score:  -0.4, degenerative changes present               Left Femoral Neck             T-score:  -2.5               Right Femoral Neck           T-score:  -2.5    Health history includes:  Bone fractures:   none  Vit D deficiency:   yes   Kidney stones:   none  Osteoporosis med:   none previously  Supplements:    MVI 1-tab daily       Calcium 600 mg 1-tab daily       Vit C 500 mg 1-tab daily  Previous [facility] labs include:     Lab Test 12/15/20  1534   VITDT 49         4/2/21. Initial osteoporosis evaluation with me at [place]  Reviewed health history and osteoporosis management  Advised starting alendronate medication with weekly dosing  She recalls having some abdominal bloating while on the medication    5/31/21. Hospitalization with cold intolerance, then febrile illness  Subsequently  had COVID-19 test which was negative  Went to [facility] ED and admitted  GI symptoms, elevated liver tests  Decision made to stop alendronate medication, last dose 5/28/21  Repeat lab testing after hospitalization, recalls improvement with liver tests  Developed right hand (index & thumb) coldness and numbness, subsequently dx radial artery clot    8/2021. Med change to raloxifene (Evista) 60 mg daily at nighttime  Tolerating medication well without apparent SE's    3/31/22 DEXA:   L1-4    T-score: -0.1   Left fem neck:   T-score: -2.4   Right fem neck:  T-score: -2.4  4/6/23 DEXA:   L1-4     T-score:  0.0, marked degenerative changes present    Left fem neck:   T-score:  -2.4    Right fem neck:  T-score:  -2.5    Continues on Citrical-D as 2-tablets BID (total 1300 mg calcium and 2000 U vit daily)   MVI 1-tab daily (includes 1000 U vit D)    Recent [facility] labs include:  Lab Results   Component Value Date     02/28/2023    POTASSIUM 4.6 02/28/2023    CHLORIDE 102 02/28/2023    CO2 25 02/28/2023    ANIONGAP 12 02/28/2023    GLC 83 02/28/2023    BUN 17.0 02/28/2023    CR 0.70 02/28/2023    GFRESTIMATED >90 02/28/2023    GFRESTBLACK >60 06/10/2021    ROBERT 9.7 02/28/2023    TSH 3.07 02/28/2023    VITDT 81 (H) 02/28/2023    PTHI 79 11/15/2021         Lives in [place], [place]  Sees Dr. Bebo Mar[facility]cal/[facility] for medicine evaluations.  Also sees Dr. Nathaniel Ceja/[facility]    PMH/PSH:  Past Medical History:   Diagnosis Date     Abdominal pain, epigastric 06/02/2021     Age-related osteoporosis without current pathological fracture      Asthma      JENNIFER III (cervical intraepithelial neoplasia grade III) with severe dysplasia 01/01/1991    [facility] biopsy     Hyperlipidemia LDL goal <130      Idiopathic thrombocytopenic purpura (H) 1981     Melanotic stools 06/02/2021     Mild intermittent asthma without complication 06/02/2021     Paresthesias in right hand     right index & thumb,  radial a. clot?     Past Surgical History:   Procedure Laterality Date     BIOPSY BREAST Right      BREAST BIOPSY, CORE RT/LT      benign     CONIZATION      JENNIFER III     LASER TX, CERVICAL  91    Laser TX Cervical JENNIFER 3 neg margins, no invasion     LUMPECTOMY BREAST      benign     SPLENECTOMY       SPLENECTOMY, TOTAL      ITP     WISDOM ST GUIDEWI         Family Hx:  Family History   Problem Relation Age of Onset     Cancer Mother          of cancer possibly from breast at age 55-60.     Breast Cancer Mother      Cancer Father      Asthma Father      Diabetes Sister      Heart Disease Sister 68        heart attack     Thyroid Disease Sister      Heart Disease Sister          due to heart issues     Heart Disease Sister      Heart Disease Sister         ablations     Thyroid nodules Sister      Diabetes Type 2  Sister      Heart Disease Sister          age 55-60?     No Known Problems Brother      No Known Problems Brother      Skin Cancer Maternal Aunt      Colon Cancer Maternal Aunt          Social Hx:  Social History     Socioeconomic History     Marital status:      Spouse name: Not on file     Number of children: 2     Years of education: Not on file     Highest education level: Not on file   Occupational History     Occupation: infusion specialist     Comment: [employer]   Tobacco Use     Smoking status: Never     Smokeless tobacco: Never   Vaping Use     Vaping status: Never Used   Substance and Sexual Activity     Alcohol use: Yes     Comment: holidays     Drug use: No     Sexual activity: Not Currently     Partners: Male     Birth control/protection: Post-menopausal     Comment: vasectomy   Other Topics Concern     Parent/sibling w/ CABG, MI or angioplasty before 65F 55M? Not Asked   Social History Narrative    She had a child in  and a second in . She had her first grandchild in .      Social Determinants of Health     Financial Resource Strain: Not on  file   Food Insecurity: Not on file   Transportation Needs: Not on file   Physical Activity: Not on file   Stress: Not on file   Social Connections: Not on file   Intimate Partner Violence: Not on file   Housing Stability: Not on file          MEDICATIONS:  has a current medication list which includes the following prescription(s): albuterol, calcium carbonate, fluticasone, multiple vitamins-minerals, raloxifene, raloxifene, vitamin c, and vitamin d.    ROS:     ROS: 10 point ROS neg other than the symptoms noted above in the HPI.    GENERAL: mild fatigue, wt stable; denies fevers, chills, night sweats.    HEENT: no dysphagia, odonophagia, diplopia, neck pain  THYROID:  no apparent hyper or hypothyroid symptoms  CV: no chest pain, pressure, palpitations  LUNGS: no SOB, HOLLIDAY, cough, wheezing   ABDOMEN: indigestion, some bloating and constipation; no diarrhea, abdominal pain  EXTREMITIES: no rashes, ulcers, edema  NEUROLOGY: some numbness at right hand index and thumb; no headaches, denies changes in vision, tingling, extremitiy numbness   MSK:  lower back pains; denies muscle weakness  SKIN: no rashes or lesions  : no menses since early 50's, denies hot flashes  PSYCH:  stable mood, no significant anxiety or depression  ENDOCRINE: no heat or cold intolerance    Physical Exam (visual exam)  VS:  no vital signs taken for video visit  CONSTITUTIONAL: healthy, alert and NAD, well dressed, answering questions appropriately  ENT: no nose swelling or nasal discharge, mouth redness or gum changes.  EYES: eyes grossly normal to inspection, conjunctivae and sclerae normal, no exophthalmos or proptosis  THYROID:  no apparent nodules or goiter  LUNGS: no audible wheeze, cough or visible cyanosis, no visible retractions or increased work of breathing  ABDOMEN: abdomen not evaluated  EXTREMITIES: no hand tremors, limited exam  NEUROLOGY: CN grossly intact, mentation intact and speech normal   SKIN:  no apparent skin lesions,  rash, or edema with visualized skin appearance  PSYCH: mentation appears normal, affect normal/bright, judgement and insight intact,   normal speech and appearance well groomed      LABS:    All pertinent notes, labs, and images personally reviewed by me.     A/P:  Encounter Diagnosis   Name Primary?     Age-related osteoporosis without current pathological fracture        Comments:  Reviewed health history and osteoporosis issues.  Risk factors include low estrogen of menopause, previous (short duration) steroid use for ITP, vit D deficiency  Reviewed recent DEXA report information, showing hip BMD mildly improved with raloxifene med plan  Reviewed and interpreted tests   Ordered appropriate tests for the endocrinology disease management.    Management options discussed and implemented after shared medical decision making with the patient.  Osteoporosis problem is chronic-stable    Plan:  Discussed general issues with the osteoporosis diagnosis and management  Reviewed concept of using the DEXA scan imaging to assess bone mineral density (BMD)  Discussed terminology of the T-score   Reviewed osteoporosis medication treatment options    Recommend:  Continue the current raloxifene (Evista) 60 mg daily dose... avoid missing doses  Continue the calcium citrate-D supplement plan  Monitor for symptom changes  Avoid heavy lifting and fall injuries  Repeat DEXA scan in 4/2025 for comparison  Follow-up endocrinology evaluation 5/2024 and annually    Cause of back pain unclear, advise she schedule appt with her PCP  Addressed patient questions today    There are no Patient Instructions on file for this visit.    Future labs ordered today: No orders of the defined types were placed in this encounter.    Radiology/Consults ordered today: None    Total time spent on day of encounter:  25 min    Follow-up:  5/2024    BLANCA Jessica MD, MS  Endocrinology  [facility]    CC: KASEY Mariscal          Again, thank you for allowing me  to participate in the care of your patient.        Sincerely,        Bebo Jessica MD

## 2024-02-29 ENCOUNTER — HOSPITAL ENCOUNTER (OUTPATIENT)
Dept: MAMMOGRAPHY | Facility: CLINIC | Age: 64
Discharge: HOME | End: 2024-02-29
Attending: INTERNAL MEDICINE | Admitting: INTERNAL MEDICINE
Payer: COMMERCIAL

## 2024-02-29 DIAGNOSIS — Z12.31 VISIT FOR SCREENING MAMMOGRAM: ICD-10-CM

## 2024-02-29 PROCEDURE — 77063 BREAST TOMOSYNTHESIS BI: CPT

## 2024-03-03 PROBLEM — J45.31 MILD PERSISTENT ASTHMA WITH EXACERBATION: Status: RESOLVED | Noted: 2021-06-10 | Resolved: 2024-03-03

## 2024-03-03 PROBLEM — E55.9 VITAMIN D DEFICIENCY: Status: RESOLVED | Noted: 2018-10-22 | Resolved: 2024-03-03

## 2024-03-03 PROBLEM — Z86.2 HISTORY OF ITP: Status: ACTIVE | Noted: 2024-03-03

## 2024-03-03 PROBLEM — D75.839 THROMBOCYTOSIS: Status: RESOLVED | Noted: 2018-10-22 | Resolved: 2024-03-03

## 2024-03-03 PROBLEM — K92.1 MELANOTIC STOOLS: Status: RESOLVED | Noted: 2021-06-02 | Resolved: 2024-03-03

## 2024-03-03 PROBLEM — J45.20 MILD INTERMITTENT ASTHMA WITHOUT COMPLICATION: Status: RESOLVED | Noted: 2021-06-02 | Resolved: 2024-03-03

## 2024-03-03 PROBLEM — R10.13 ABDOMINAL PAIN, EPIGASTRIC: Status: RESOLVED | Noted: 2021-06-02 | Resolved: 2024-03-03

## 2024-03-03 PROBLEM — M81.0 OSTEOPOROSIS: Status: ACTIVE | Noted: 2024-03-03

## 2024-03-03 PROBLEM — R74.8 ELEVATED LIVER ENZYMES: Status: RESOLVED | Noted: 2021-06-01 | Resolved: 2024-03-03

## 2024-03-03 PROBLEM — R74.01 TRANSAMINITIS: Status: RESOLVED | Noted: 2021-05-31 | Resolved: 2024-03-03

## 2024-03-03 PROBLEM — H91.93 BILATERAL HEARING LOSS, UNSPECIFIED HEARING LOSS TYPE: Status: RESOLVED | Noted: 2021-06-10 | Resolved: 2024-03-03

## 2024-03-03 PROBLEM — E46 PROTEIN-CALORIE MALNUTRITION (H): Status: RESOLVED | Noted: 2021-06-10 | Resolved: 2024-03-03

## 2024-03-03 SDOH — HEALTH STABILITY: PHYSICAL HEALTH: ON AVERAGE, HOW MANY MINUTES DO YOU ENGAGE IN EXERCISE AT THIS LEVEL?: 20 MIN

## 2024-03-03 SDOH — HEALTH STABILITY: PHYSICAL HEALTH: ON AVERAGE, HOW MANY DAYS PER WEEK DO YOU ENGAGE IN MODERATE TO STRENUOUS EXERCISE (LIKE A BRISK WALK)?: 1 DAY

## 2024-03-03 ASSESSMENT — ASTHMA QUESTIONNAIRES
ACT_TOTALSCORE: 23
QUESTION_4 LAST FOUR WEEKS HOW OFTEN HAVE YOU USED YOUR RESCUE INHALER OR NEBULIZER MEDICATION (SUCH AS ALBUTEROL): ONCE A WEEK OR LESS
QUESTION_2 LAST FOUR WEEKS HOW OFTEN HAVE YOU HAD SHORTNESS OF BREATH: ONCE OR TWICE A WEEK
ACT_TOTALSCORE: 23
QUESTION_5 LAST FOUR WEEKS HOW WOULD YOU RATE YOUR ASTHMA CONTROL: COMPLETELY CONTROLLED
QUESTION_1 LAST FOUR WEEKS HOW MUCH OF THE TIME DID YOUR ASTHMA KEEP YOU FROM GETTING AS MUCH DONE AT WORK, SCHOOL OR AT HOME: NONE OF THE TIME
QUESTION_3 LAST FOUR WEEKS HOW OFTEN DID YOUR ASTHMA SYMPTOMS (WHEEZING, COUGHING, SHORTNESS OF BREATH, CHEST TIGHTNESS OR PAIN) WAKE YOU UP AT NIGHT OR EARLIER THAN USUAL IN THE MORNING: NOT AT ALL
EMERGENCY_ROOM_LAST_YEAR_TOTAL: ONE

## 2024-03-03 ASSESSMENT — SOCIAL DETERMINANTS OF HEALTH (SDOH): HOW OFTEN DO YOU GET TOGETHER WITH FRIENDS OR RELATIVES?: ONCE A WEEK

## 2024-03-04 ENCOUNTER — OFFICE VISIT (OUTPATIENT)
Dept: INTERNAL MEDICINE | Facility: CLINIC | Age: 64
End: 2024-03-04
Payer: COMMERCIAL

## 2024-03-04 VITALS
WEIGHT: 131.2 LBS | BODY MASS INDEX: 22.4 KG/M2 | OXYGEN SATURATION: 97 % | HEART RATE: 68 BPM | SYSTOLIC BLOOD PRESSURE: 112 MMHG | DIASTOLIC BLOOD PRESSURE: 74 MMHG | TEMPERATURE: 98.1 F | HEIGHT: 64 IN

## 2024-03-04 DIAGNOSIS — Z13.220 SCREENING FOR CHOLESTEROL LEVEL: ICD-10-CM

## 2024-03-04 DIAGNOSIS — Z23 NEED FOR VACCINATION: ICD-10-CM

## 2024-03-04 DIAGNOSIS — Z13.1 SCREENING FOR DIABETES MELLITUS: ICD-10-CM

## 2024-03-04 DIAGNOSIS — E55.9 VITAMIN D DEFICIENCY: ICD-10-CM

## 2024-03-04 DIAGNOSIS — Z00.00 ROUTINE HISTORY AND PHYSICAL EXAMINATION OF ADULT: Primary | ICD-10-CM

## 2024-03-04 PROBLEM — H90.3 SENSORINEURAL HEARING LOSS, BILATERAL: Status: ACTIVE | Noted: 2024-03-04

## 2024-03-04 PROBLEM — J45.20 MILD INTERMITTENT ASTHMA: Status: ACTIVE | Noted: 2024-03-04

## 2024-03-04 LAB
ALBUMIN SERPL BCG-MCNC: 4.2 G/DL (ref 3.5–5.2)
ALP SERPL-CCNC: 60 U/L (ref 40–150)
ALT SERPL W P-5'-P-CCNC: 15 U/L (ref 0–50)
ANION GAP SERPL CALCULATED.3IONS-SCNC: 8 MMOL/L (ref 7–15)
AST SERPL W P-5'-P-CCNC: 24 U/L (ref 0–45)
BILIRUB SERPL-MCNC: 0.3 MG/DL
BUN SERPL-MCNC: 16.3 MG/DL (ref 8–23)
CALCIUM SERPL-MCNC: 10.1 MG/DL (ref 8.8–10.2)
CHLORIDE SERPL-SCNC: 108 MMOL/L (ref 98–107)
CHOLEST SERPL-MCNC: 211 MG/DL
CREAT SERPL-MCNC: 0.8 MG/DL (ref 0.51–0.95)
DEPRECATED HCO3 PLAS-SCNC: 29 MMOL/L (ref 22–29)
EGFRCR SERPLBLD CKD-EPI 2021: 82 ML/MIN/1.73M2
FASTING STATUS PATIENT QL REPORTED: YES
GLUCOSE SERPL-MCNC: 83 MG/DL (ref 70–99)
HDLC SERPL-MCNC: 82 MG/DL
LDLC SERPL CALC-MCNC: 111 MG/DL
NONHDLC SERPL-MCNC: 129 MG/DL
POTASSIUM SERPL-SCNC: 4.6 MMOL/L (ref 3.4–5.3)
PROT SERPL-MCNC: 7 G/DL (ref 6.4–8.3)
SODIUM SERPL-SCNC: 145 MMOL/L (ref 135–145)
TRIGL SERPL-MCNC: 88 MG/DL
VIT D+METAB SERPL-MCNC: 61 NG/ML (ref 20–50)

## 2024-03-04 PROCEDURE — 90678 RSV VACC PREF BIVALENT IM: CPT | Performed by: INTERNAL MEDICINE

## 2024-03-04 PROCEDURE — 99396 PREV VISIT EST AGE 40-64: CPT | Mod: 25 | Performed by: INTERNAL MEDICINE

## 2024-03-04 PROCEDURE — 80053 COMPREHEN METABOLIC PANEL: CPT | Performed by: INTERNAL MEDICINE

## 2024-03-04 PROCEDURE — 90471 IMMUNIZATION ADMIN: CPT | Performed by: INTERNAL MEDICINE

## 2024-03-04 PROCEDURE — 80061 LIPID PANEL: CPT | Performed by: INTERNAL MEDICINE

## 2024-03-04 PROCEDURE — 36415 COLL VENOUS BLD VENIPUNCTURE: CPT | Performed by: INTERNAL MEDICINE

## 2024-03-04 PROCEDURE — 82306 VITAMIN D 25 HYDROXY: CPT | Performed by: INTERNAL MEDICINE

## 2024-03-04 NOTE — PATIENT INSTRUCTIONS
"    Scheduling an appointment can be hard sometimes.  Here are some tips:    For routine visits, try to schedule at least 3-6 months ahead of time.    For urgent issues: Please call [phone] and request to speak to your \"care team.\" Once speaking to a care team member, please ask them to send me a message requesting to be \"worked in.\" Please provide care team member with details re: your concern or symptom.    "

## 2024-03-04 NOTE — PROGRESS NOTES
ASSESSMENT/PLAN                                                       (Z00.00) Routine history and physical examination of adult  (primary encounter diagnosis)  Comment: PMH, PSH, FH, SH, medications, allergies, immunizations, and preventative health measures reviewed and updated as appropriate.  Plan: see below for plans.      (Z23) Need for vaccination  Comment: RSV vaccine given today.    (Z13.220) Screening for cholesterol level  (Z13.1) Screening for diabetes mellitus  (E55.9) Vitamin D deficiency  Plan: fasting labs today.     Nova Sands MD   [place] [address] [address] [address]  T: [phone], F: [phone]    SUBJECTIVE                                                      Crys Quarles is a very pleasant 63 year old female who presents for a physical.    ROS:  Constitutional: no unintentional weight loss or gain reported; no fevers, chills, or sweats reported  Cardiovascular: no chest pain, palpitations, or edema reported  Respiratory: no cough, wheezing, shortness of breath, or dyspnea on exertion reported  Gastrointestinal: no nausea, vomiting, constipation, diarrhea, or abdominal pain reported  Genitourinary: no urinary frequency, urgency, dysuria, or hematuria reported  Integumentary: no rash or pruritus reported  Musculoskeletal: no back pain, muscle pain, joint pain, or joint swelling reported  Neurologic: no focal weakness, numbness, or tingling reported  Hematologic: no easy bruising or bleeding reported  Endocrine: no heat or cold intolerance reported; no polyuria or polydipsia reported  Psychiatric: no anxiety or depression reported    Past Medical History:   Diagnosis Date    History of ITP     s/p splenectomy in 1984    Mild intermittent asthma     Osteoporosis     Sensorineural hearing loss, bilateral     wears hearing aids     Past Surgical History:   Procedure Laterality Date    BREAST BIOPSY, RT/LT Left     benign    CONIZATION  01/01/1991     JENNIFER III    LASER TX, CERVICAL  12/23/1991    Laser TX Cervical JENNIFER 3 neg margins, no invasion    SPLENECTOMY, TOTAL  01/01/1984    ITP     Family History   Problem Relation Age of Onset    Lymphoma Mother         with ? spread to breast    Brain Tumor Father         with ? mets to lung    Diabetes Type 1 Sister     Hypothyroidism Sister     Colon Cancer Maternal Aunt     Diabetes Type 2  No family hx of     Myocardial Infarction No family hx of     Cerebrovascular Disease No family hx of     Coronary Artery Disease Early Onset No family hx of     Ovarian Cancer No family hx of      Social History     Occupational History    Occupation: Pharmacy Technician   Tobacco Use    Smoking status: Never    Smokeless tobacco: Never   Vaping Use    Vaping Use: Never used   Substance and Sexual Activity    Alcohol use: Yes     Comment: rare    Drug use: No    Sexual activity: Not Currently   Social History Narrative    .    2 adult children.    2 grandchildren.    Active job; no formal exercise.      Allergies   Allergen Reactions    Fosamax [Alendronate] Other (See Comments)     elevated liver enzymes      Current Outpatient Medications   Medication Sig    albuterol (PROAIR HFA/PROVENTIL HFA/VENTOLIN HFA) 108 (90 Base) MCG/ACT inhaler Inhale 2 puffs into the lungs every 6 hours    calcium carbonate (OS-ROBERT) 600 MG tablet Take by mouth daily    fluticasone (FLOVENT HFA) 110 MCG/ACT inhaler Inhale 1 puff into the lungs 2 times daily as needed     Multiple Vitamins-Minerals (MULTIVITAMIN ADULTS PO)     raloxifene (EVISTA) 60 MG tablet Take 1 tablet (60 mg) by mouth daily     Immunization History   Administered Date(s) Administered    COVID-19 12+ (2023-24) (MODERNA) 10/15/2023    COVID-19 Bivalent 18+ (Moderna) 10/15/2022    COVID-19 Monovalent 18+ (Moderna) 02/11/2021, 03/10/2021, 11/17/2021    HepB, Unspecified 09/12/2016, 10/11/2016, 04/12/2017    Hepatitis A (ADULT 19+) 12/27/2016, 06/27/2017    Influenza (IIV3) PF  "09/24/2014    Influenza Vaccine 18-64 (Flublok) 10/15/2022    Influenza Vaccine >6 months,quad, PF 09/29/2015, 10/22/2018, 09/20/2020, 10/17/2021, 10/15/2023    Pneumo Conj 13-V (2010&after) 12/27/2016    Pneumococcal 23 valent 10/11/2010, 12/27/2021    TDAP (Adacel,Boostrix) 12/27/2016    Zoster recombinant adjuvanted (SHINGRIX) 10/22/2018, 01/22/2019     PREVENTATIVE HEALTH                                                      BMI: within normal limits   Blood pressure: within normal limits   Breast CA screening: up to date   Cervical CA screening: up to date   Colon CA screening: up to date   Lung CA screening: n/a   Dexa: up to date   Screening cholesterol: DUE  Screening diabetes: DUE  STD testing: not sexually active  Alcohol misuse screening: alcohol use reviewed - no intervention indicated at this time  Immunizations: reviewed;  RSV vaccine DUE    OBJECTIVE                                                      /74   Pulse 68   Temp 98.1  F (36.7  C) (Temporal)   Ht 1.626 m (5' 4\")   Wt 59.5 kg (131 lb 3.2 oz)   LMP 03/06/2008   SpO2 97%   BMI 22.52 kg/m    Constitutional: well-appearing  Head, Ears, and Eyes: normocephalic; normal external auditory canal and pinna; tympanic membranes visualized and normal; normal lids and conjunctivae  Neck: supple, symmetric, no thyromegaly or lymphadenopathy  Respiratory: normal respiratory effort; clear to auscultation bilaterally  Cardiovascular: regular rate and rhythm; no edema  Gastrointestinal: soft, non-tender, and non-distended; no organomegaly or masses  Musculoskeletal: normal gait and station  Psych: normal judgment and insight; normal mood and affect; recent and remote memory intact    ---  (Note was completed, in part, with [employer] voice-recognition software. Documentation was reviewed, but some grammatical, spelling, and word errors may remain.)    "

## 2024-04-24 DIAGNOSIS — J45.20 MILD INTERMITTENT REACTIVE AIRWAY DISEASE WITHOUT COMPLICATION: ICD-10-CM

## 2024-04-24 RX ORDER — ALBUTEROL SULFATE 90 UG/1
AEROSOL, METERED RESPIRATORY (INHALATION)
Qty: 18 G | Refills: 0 | Status: SHIPPED | OUTPATIENT
Start: 2024-04-24

## 2024-05-16 ENCOUNTER — OFFICE VISIT (OUTPATIENT)
Dept: ENDOCRINOLOGY | Facility: CLINIC | Age: 64
End: 2024-05-16
Payer: COMMERCIAL

## 2024-05-16 VITALS
SYSTOLIC BLOOD PRESSURE: 135 MMHG | HEART RATE: 65 BPM | BODY MASS INDEX: 22.9 KG/M2 | WEIGHT: 133.4 LBS | DIASTOLIC BLOOD PRESSURE: 77 MMHG

## 2024-05-16 DIAGNOSIS — M81.0 AGE-RELATED OSTEOPOROSIS WITHOUT CURRENT PATHOLOGICAL FRACTURE: Primary | ICD-10-CM

## 2024-05-16 PROCEDURE — G2211 COMPLEX E/M VISIT ADD ON: HCPCS | Performed by: INTERNAL MEDICINE

## 2024-05-16 PROCEDURE — 99213 OFFICE O/P EST LOW 20 MIN: CPT | Performed by: INTERNAL MEDICINE

## 2024-05-16 NOTE — PROGRESS NOTES
Recent issues:  Osteoporosis follow-up evaluation  Previous history using alendronate then discontinued (5/2021), started raloxifene 8/2021 but bloating and intermittent discontinuation (8/2021),   then restarted raloxifene in 10/2022... tolerating well  Taking calcium citrate 1-tab BID (prev 2-BID 3/2024)  Reviewed previous 4/2023 DEXA scan results today        1982. History of ITP, then Prednisone medication for ~2 yrs duration  Menopause early 50's, no subsequent HRT  Previous medical evaluations with Dr. Bettina Salgado/[facility]    1/23/17 DEXA:   L1-4    T-score:  -0.4    Left fem neck   T-score: -2.0   Right fem neck  T-score: 12.2    12/2020. GYN evaluation with Dr. Bebo Mariscal/[facility]   1/28/21 DEXA:               Lumbar Spine (L1-L4)       T-score:  -0.4, degenerative changes present               Left Femoral Neck             T-score:  -2.5               Right Femoral Neck           T-score:  -2.5    Health history includes:  Bone fractures:   none  Vit D deficiency:   yes   Kidney stones:   none  Osteoporosis med:   none previously  Supplements:    MVI 1-tab daily       Calcium 600 mg 1-tab daily       Vit C 500 mg 1-tab daily  Previous [facility] labs include:     Lab Test 12/15/20  1534   VITDT 49         4/2/21. Initial osteoporosis evaluation with me at [facility]  Reviewed health history and osteoporosis management  Advised starting alendronate medication with weekly dosing  She recalls having some abdominal bloating while on the medication    5/31/21. Hospitalization with cold intolerance, then febrile illness  Subsequently had COVID-19 test which was negative  Went to [facility] ED and admitted  GI symptoms, elevated liver tests  Decision made to stop alendronate medication, last dose 5/28/21  Repeat lab testing after hospitalization, recalls improvement with liver tests  Developed right hand (index & thumb) coldness and numbness, subsequently dx radial artery clot    8/2021. Med change to  raloxifene (Evista) 60 mg daily at nighttime  Tolerating medication well without apparent SE's    3/31/22 DEXA:   L1-4    T-score: -0.1   Left fem neck:   T-score: -2.4   Right fem neck:  T-score: -2.4  4/6/23 DEXA:   L1-4     T-score:  0.0, marked degenerative changes present    Left fem neck:   T-score:  -2.4    Right fem neck:  T-score:  -2.5    Continues on Citrical-D as 2-tablets BID (total 1300 mg calcium and 2000 U vit daily)   MVI 1-tab daily (includes 1000 U vit D)    Recent [facility] labs include:  Lab Results   Component Value Date     03/04/2024    POTASSIUM 4.6 03/04/2024    CHLORIDE 108 (H) 03/04/2024    CO2 29 03/04/2024    ANIONGAP 8 03/04/2024    GLC 83 03/04/2024    BUN 16.3 03/04/2024    CR 0.80 03/04/2024    GFRESTIMATED 82 03/04/2024    GFRESTBLACK >60 06/10/2021    ROBERT 10.1 03/04/2024    TSH 3.07 02/28/2023    VITDT 61 (H) 03/04/2024    PTHI 79 11/15/2021     Current dose:  raloxifene 60 mg daily      Lives in [place], [place]  Sees Dr. Nova Sands/[facility] for gen med evaluations  Previously saw Dr. Bebo Mariscal/[facility]   Also sees Dr. Nathaniel Ceja/[facility]    PMH/PSH:  Past Medical History:   Diagnosis Date    History of ITP     s/p splenectomy in 1984    Mild intermittent asthma     Osteoporosis     Sensorineural hearing loss, bilateral     wears hearing aids     Past Surgical History:   Procedure Laterality Date    BREAST BIOPSY, RT/LT Left     benign    CONIZATION  01/01/1991    JENNIFER III    LASER TX, CERVICAL  12/23/1991    Laser TX Cervical JENNIFER 3 neg margins, no invasion    SPLENECTOMY, TOTAL  01/01/1984    ITP       Family Hx:  Family History   Problem Relation Age of Onset    Lymphoma Mother         with ? spread to breast    Brain Tumor Father         with ? mets to lung    Diabetes Type 1 Sister     Hypothyroidism Sister     Colon Cancer Maternal Aunt     Diabetes Type 2  No family hx of     Myocardial Infarction No  family hx of     Cerebrovascular Disease No family hx of     Coronary Artery Disease Early Onset No family hx of     Ovarian Cancer No family hx of          Social Hx:  Social History     Socioeconomic History    Marital status:      Spouse name: Not on file    Number of children: 2    Years of education: Not on file    Highest education level: Not on file   Occupational History    Occupation: Pharmacy Technician   Tobacco Use    Smoking status: Never    Smokeless tobacco: Never   Vaping Use    Vaping status: Never Used   Substance and Sexual Activity    Alcohol use: Yes     Comment: rare    Drug use: No    Sexual activity: Not Currently   Other Topics Concern    Parent/sibling w/ CABG, MI or angioplasty before 65F 55M? Not Asked   Social History Narrative    .    2 adult children.    2 grandchildren.    Active job; no formal exercise.      Social Determinants of Health     Financial Resource Strain: Low Risk  (3/3/2024)    Financial Resource Strain     Within the past 12 months, have you or your family members you live with been unable to get utilities (heat, electricity) when it was really needed?: No   Food Insecurity: Low Risk  (3/3/2024)    Food Insecurity     Within the past 12 months, did you worry that your food would run out before you got money to buy more?: No     Within the past 12 months, did the food you bought just not last and you didn t have money to get more?: No   Transportation Needs: Low Risk  (3/3/2024)    Transportation Needs     Within the past 12 months, has lack of transportation kept you from medical appointments, getting your medicines, non-medical meetings or appointments, work, or from getting things that you need?: No   Physical Activity: Insufficiently Active (3/3/2024)    Exercise Vital Sign     Days of Exercise per Week: 1 day     Minutes of Exercise per Session: 20 min   Stress: No Stress Concern Present (3/3/2024)    [employer] [place] [employer] -  Occupational Stress Questionnaire     Feeling of Stress : Only a little   Social Connections: Unknown (3/3/2024)    Social Connection and Isolation Panel [NHANES]     Frequency of Communication with Friends and Family: Not on file     Frequency of Social Gatherings with Friends and Family: Once a week     Attends Taoist Services: Not on file     Active Member of Clubs or Organizations: Not on file     Attends Club or Organization Meetings: Not on file     Marital Status: Not on file   Interpersonal Safety: Low Risk  (3/4/2024)    Interpersonal Safety     Do you feel physically and emotionally safe where you currently live?: Yes     Within the past 12 months, have you been hit, slapped, kicked or otherwise physically hurt by someone?: No     Within the past 12 months, have you been humiliated or emotionally abused in other ways by your partner or ex-partner?: No   Housing Stability: Low Risk  (3/3/2024)    Housing Stability     Do you have housing? : Yes     Are you worried about losing your housing?: No          MEDICATIONS:  has a current medication list which includes the following prescription(s): calcium citrate-vitamin d, fluticasone, multiple vitamins-minerals, raloxifene, ventolin hfa, and calcium carbonate.    ROS:     ROS: 10 point ROS neg other than the symptoms noted above in the HPI.    GENERAL: mild fatigue, wt stable; denies fevers, chills, night sweats.    HEENT: no dysphagia, odonophagia, diplopia, neck pain  THYROID:  no apparent hyper or hypothyroid symptoms  CV: no chest pain, pressure, palpitations  LUNGS: no SOB, HOLLIDAY, cough, wheezing   ABDOMEN: indigestion, some bloating and constipation; no diarrhea, abdominal pain  EXTREMITIES: no rashes, ulcers, edema  NEUROLOGY: some numbness at right hand index and thumb; no headaches, denies changes in vision, tingling, extremitiy numbness   MSK:  lower back pains; denies muscle weakness  SKIN: no rashes or lesions  : no menses since early 50's,  denies hot flashes  PSYCH:  stable mood, no significant anxiety or depression  ENDOCRINE: no heat or cold intolerance      Physical Exam   VS: /77   Pulse 65   Wt 60.5 kg (133 lb 6.4 oz)   LMP 03/06/2008   BMI 22.90 kg/m    GENERAL: AXOX3, NAD, well dressed, answering questions appropriately, appears stated age.  ENT: no nose swelling or nasal discharge, mouth redness or gum changes.  EYES: eyes grossly normal to inspection, conjunctivae and sclerae normal, no exophthalmos or proptosis  THYROID:  no apparent nodules or goiter  LUNGS: no audible wheeze, cough or visible cyanosis, or increased work of breathing  BACK:  nontender to palpation  ABDOMEN: abdomen normal size  EXTREMITIES: no edema noted  NEUROLOGY: CN grossly intact, no tremors  MSK: grossly intact  SKIN:  no apparent skin lesions, rash, or edema with visualized skin appearance  PSYCH: mentation appears normal, affect normal/bright, judgement and insight intact,   normal speech and appearance well groomed      LABS:    All pertinent notes, labs, and images personally reviewed by me.     A/P:  Encounter Diagnosis   Name Primary?    Age-related osteoporosis without current pathological fracture Yes     Comments:  Reviewed health history and osteoporosis issues.  Risk factors include low estrogen of menopause, previous (short duration) steroid use for ITP, vit D deficiency  Reviewed and interpreted tests   Ordered appropriate tests for the endocrinology disease management.    Management options discussed and implemented after shared medical decision making with the patient.  Osteoporosis problem is chronic-stable    Plan:  Discussed general issues with the osteoporosis diagnosis and management  Reviewed concept of using the DEXA scan imaging to assess bone mineral density (BMD)  Discussed terminology of the T-score   Reviewed the osteoporosis medication treatment options    Recommend:  Continue the current raloxifene (Evista) 60 mg daily dose...  avoid missing doses  Continue the calcium citrate-D supplement as 1-tab BID  Monitor for symptom changes  Avoid heavy lifting and fall injuries  Repeat DEXA scan in for comparison   Scan at [facility], or [place] sites   Procedure order placed  If significant worsening of BMD, consider med change for treatment  Will summarize results with [employer] message when available    Addressed patient questions today    The longitudinal plan of care for the endocrine problem(s) were addressed during this visit.  Due to added complexity of care,   we will continue to support the patient and the subsequent management of this condition with ongoing continuity of care.    There are no Patient Instructions on file for this visit.    Future labs ordered today:   Orders Placed This Encounter   Procedures    DX Bone Density     Radiology/Consults ordered today: DX BONE DENSITY    Total time spent on day of encounter:  17 min    Follow-up:  5/2025, Return    BLANCA Jessica MD, MS  Endocrinology  [facility]    CC:  ROBIN Sands

## 2024-05-20 ENCOUNTER — TRANSFERRED RECORDS (OUTPATIENT)
Dept: HEALTH INFORMATION MANAGEMENT | Facility: CLINIC | Age: 64
End: 2024-05-20
Payer: COMMERCIAL

## 2024-07-11 ENCOUNTER — ANCILLARY PROCEDURE (OUTPATIENT)
Dept: BONE DENSITY | Facility: CLINIC | Age: 64
End: 2024-07-11
Attending: INTERNAL MEDICINE
Payer: COMMERCIAL

## 2024-07-11 DIAGNOSIS — M81.0 AGE-RELATED OSTEOPOROSIS WITHOUT CURRENT PATHOLOGICAL FRACTURE: ICD-10-CM

## 2024-07-11 PROCEDURE — 77080 DXA BONE DENSITY AXIAL: CPT | Mod: TC

## 2024-07-14 ENCOUNTER — MYC MEDICAL ADVICE (OUTPATIENT)
Dept: ENDOCRINOLOGY | Facility: CLINIC | Age: 64
End: 2024-07-14
Payer: COMMERCIAL

## 2024-07-22 DIAGNOSIS — M81.0 AGE-RELATED OSTEOPOROSIS WITHOUT CURRENT PATHOLOGICAL FRACTURE: Primary | ICD-10-CM

## 2024-07-22 RX ORDER — RISEDRONATE SODIUM 35 MG/1
35 TABLET, FILM COATED ORAL
Qty: 12 TABLET | Refills: 1 | Status: SHIPPED | OUTPATIENT
Start: 2024-07-22

## 2024-08-20 DIAGNOSIS — M81.0 AGE-RELATED OSTEOPOROSIS WITHOUT CURRENT PATHOLOGICAL FRACTURE: ICD-10-CM

## 2024-08-20 RX ORDER — RALOXIFENE HYDROCHLORIDE 60 MG/1
60 TABLET, FILM COATED ORAL DAILY
Qty: 90 TABLET | Refills: 0 | OUTPATIENT
Start: 2024-08-20

## 2024-08-20 NOTE — TELEPHONE ENCOUNTER
Patient no longer taking Evista. Review of chart notes indicate change to Actonel on 7/22/24.    Amol Holden RN

## 2025-01-04 DIAGNOSIS — M81.0 AGE-RELATED OSTEOPOROSIS WITHOUT CURRENT PATHOLOGICAL FRACTURE: ICD-10-CM

## 2025-01-06 RX ORDER — RISEDRONATE SODIUM 35 MG/1
35 TABLET, FILM COATED ORAL
Qty: 12 TABLET | Refills: 0 | Status: SHIPPED | OUTPATIENT
Start: 2025-01-06

## 2025-01-06 NOTE — TELEPHONE ENCOUNTER
Last Written Prescription Date:  7/22/24  Last Fill Quantity: 12,  # refills: 1   Last office visit: 5/16/2024 ; last virtual visit: 5/25/2023 with prescribing provider:  Dr. Jessica   Future Office Visit: 5/21/25    Requested Prescriptions   Pending Prescriptions Disp Refills    RISEdronate (ACTONEL) 35 MG tablet [Pharmacy Med Name: Risedronate Sodium Oral Tablet 35 MG] 12 tablet 0     Sig: Take 1 tablet (35 mg) by mouth every 7 days       Bisphosphonates Passed - 1/6/2025 10:01 AM        Passed - Dexa scan completed in the past 48-months     Please review last Dexa result.           Passed - Medication is active on med list        Passed - Medication indicated for associated diagnosis     The medication is prescribed for one or more of the following conditions:     Osteoporosis   Osteitis Deformans (Paget's Disease)   Postmenopausal    Osteopenia   Arthroplasty   Crohn's Disease   Cystic Fibrosis   Fibrous Dysplasia of bone   Growth Hormone Deficiency   Hypercalcemia   Juvenile Osteoporosis   Hypogonadism          Passed - Recent (12 mo) or future (90 days) visit within the authorizing provider's specialty     The patient must have completed an in-person or virtual visit within the past 12 months or has a future visit scheduled within the next 90 days with the authorizing provider s specialty.  Urgent care and e-visits do not qualify as an office visit for this protocol.          Passed - Most recent Creatinine Clearance in last 12 months >35        Passed - Patient is age 18 or older           Refill sent  Uyen Alba RN

## 2025-01-27 ENCOUNTER — TELEPHONE (OUTPATIENT)
Dept: ENDOCRINOLOGY | Facility: CLINIC | Age: 65
End: 2025-01-27
Payer: COMMERCIAL

## 2025-01-27 DIAGNOSIS — M81.0 AGE-RELATED OSTEOPOROSIS WITHOUT CURRENT PATHOLOGICAL FRACTURE: Primary | ICD-10-CM

## 2025-01-27 NOTE — TELEPHONE ENCOUNTER
[facility] Call Center    Phone Message    May a detailed message be left on voicemail: yes     Reason for Call: Other: Pt would like to schedule a dexa bone density scan. Please place imaging orders in the system. If pt needs labs before her next appt, please place those orders as well. Thank you.     Action Taken: Other: Endo    Travel Screening: Not Applicable     Date of Service:

## 2025-01-28 NOTE — TELEPHONE ENCOUNTER
Message noted.  She is not due for another DEXA now... her last DEXA scan was 7/11/24 and I recommend next scan in 7/2026.  The preappt (non-fasting) lab orders are now available in her [facility] chart.  Please relay message.    BLANCA Jessica MD, MS  Endocrinology  [facility]

## 2025-02-07 ENCOUNTER — ANCILLARY PROCEDURE (OUTPATIENT)
Dept: GENERAL RADIOLOGY | Facility: CLINIC | Age: 65
End: 2025-02-07
Attending: FAMILY MEDICINE
Payer: COMMERCIAL

## 2025-02-07 ENCOUNTER — TELEPHONE (OUTPATIENT)
Dept: ENDOCRINOLOGY | Facility: CLINIC | Age: 65
End: 2025-02-07

## 2025-02-07 DIAGNOSIS — M54.50 ACUTE BILATERAL LOW BACK PAIN WITHOUT SCIATICA: ICD-10-CM

## 2025-02-07 DIAGNOSIS — M79.604 BILATERAL LEG PAIN: ICD-10-CM

## 2025-02-07 DIAGNOSIS — M79.605 BILATERAL LEG PAIN: ICD-10-CM

## 2025-02-07 PROCEDURE — 72100 X-RAY EXAM L-S SPINE 2/3 VWS: CPT | Mod: TC | Performed by: RADIOLOGY

## 2025-02-07 PROCEDURE — 73522 X-RAY EXAM HIPS BI 3-4 VIEWS: CPT | Mod: TC | Performed by: RADIOLOGY

## 2025-02-07 NOTE — TELEPHONE ENCOUNTER
Called pt.  States she has been dealing with sciatic pain on sides of legs, and saw chiropractor in Nov/Dec which helped.  Pt fell on buttocks on cement on Sunday, and now pain is going all around legs with low back pain.  Pain can get up to 6-7.  Taking Tylenol with some relief. Advised pt to see PCP as Xrays may be needed.  Pt verbalized understanding.      Pt also wanted to know when she needs to get dexa scan.  Last one was in July 2024.   Per ov notes 5/16/24: Repeat DEXA scan in for comparison.Scan at [facility], [place], or [place] sites    Will route to provider to advise on when to get next dexa scan.    Uyen Alba RN

## 2025-02-07 NOTE — TELEPHONE ENCOUNTER
[facility] Call Center    Phone Message    May a detailed message be left on voicemail: yes     Reason for Call: Other: Per pt had a fall last week and have some pain in her leg now. Per pt wants to run this by  and ask what he would recommend for opt to do at this time. Please call her after 9 am since she will be driving until 9 am. Thank you.        Action Taken: Message routed to:  [facility] ENDO    Travel Screening: Not Applicable     Date of Service:

## 2025-02-08 NOTE — TELEPHONE ENCOUNTER
Message reviewed.  I agree with the triage nurse message that patient should see her PCP about the back and radiating leg pains.  The last DEXA was done in 7/2024 and the osteoporosis med was changed from raloxifene (Evista) daily to Actonel tablet weekly.  I recommend she keep the scheduled endocrinology appointment 5/21/25 and then next DEXA in early 8/2025.    Please relay feedback.    BLANCA Jessica MD, MS  Endocrinology  [facility]

## 2025-02-10 ENCOUNTER — MYC MEDICAL ADVICE (OUTPATIENT)
Dept: ENDOCRINOLOGY | Facility: CLINIC | Age: 65
End: 2025-02-10
Payer: COMMERCIAL

## 2025-02-11 ENCOUNTER — TELEPHONE (OUTPATIENT)
Dept: INTERNAL MEDICINE | Facility: CLINIC | Age: 65
End: 2025-02-11
Payer: COMMERCIAL

## 2025-02-11 NOTE — TELEPHONE ENCOUNTER
Patient Returning Call    Reason for call:  Patient has questions regarding scans and PT for the back pain. Would like more clarification on process.     Information relayed to patient:  n/a    Patient has additional questions:  No      Could we send this information to you [facility] or would you prefer to receive a phone call?:   Patient would prefer a phone call   Okay to leave a detailed message?: Yes at Home number on file [phone](home)

## 2025-02-12 NOTE — TELEPHONE ENCOUNTER
Called and left detailed VM for pt with provider note below.   Advised her to return call to the clinic with further questions.    Thank you,  Lavonne Figueroa RN

## 2025-02-12 NOTE — TELEPHONE ENCOUNTER
Spoke with patient she needed number for PT. Gave number so will call to set up PT. Patient is taking tylenol every two hours and she is wondering if she can get the tylenol 3 that she was offered at urgent care. Patient thinks it may be muscular and wondering if she can do or have anything to help with that.

## 2025-02-12 NOTE — TELEPHONE ENCOUNTER
FYI - Status Update    Who is Calling: patient    Update: Pt calling in again to request update on this/call back.    Does caller want a call/response back: Yes     Could we send this information to you in [employer] or would you prefer to receive a phone call?:   Patient would prefer a phone call   Okay to leave a detailed message?: Yes at Cell number on file:    Telephone Information:   Mobile [phone]

## 2025-02-17 ENCOUNTER — THERAPY VISIT (OUTPATIENT)
Dept: PHYSICAL THERAPY | Facility: REHABILITATION | Age: 65
End: 2025-02-17
Attending: FAMILY MEDICINE
Payer: COMMERCIAL

## 2025-02-17 DIAGNOSIS — M79.604 BILATERAL LEG PAIN: ICD-10-CM

## 2025-02-17 DIAGNOSIS — M79.605 BILATERAL LEG PAIN: ICD-10-CM

## 2025-02-17 DIAGNOSIS — M54.50 ACUTE BILATERAL LOW BACK PAIN WITHOUT SCIATICA: Primary | ICD-10-CM

## 2025-02-17 PROCEDURE — 97161 PT EVAL LOW COMPLEX 20 MIN: CPT | Mod: GP

## 2025-02-17 PROCEDURE — 97140 MANUAL THERAPY 1/> REGIONS: CPT | Mod: GP

## 2025-02-17 PROCEDURE — 97110 THERAPEUTIC EXERCISES: CPT | Mod: GP

## 2025-02-17 NOTE — PROGRESS NOTES
PHYSICAL THERAPY EVALUATION  Type of Visit: Evaluation    Subjective         Presenting condition or subjective complaint: Stabbing pain and numbness in both legs. Pain level is usually around 7.  Date of onset: 02/02/25 (DOI)    Relevant medical history: Asthma; Hearing problems; Menopause; Migraines or headaches; Osteoporosis; Pain at night or rest   Dates & types of surgery: 1984 splenectomy,  1991 cone cervical surgery,  19xx benige lump removed from right breast    Prior diagnostic imaging/testing results: X-ray     XR PELVIS AND HIP IMPRESSION: No acute fracture or malalignment. Mild degenerative changes throughout the pelvis. Severe degenerative changes of the lower lumbar spine. Radiodensities in right hemiabdomen likely represent gallstones.     XR LUMBAR IMPRESSION:  Diffuse osseous demineralization. Dextrocurvature centered around L1. Mild stepwise retrolisthesis from L1-S1. Vertebral body heights within normal limits. No discernible acute fracture. Moderate degenerative changes throughout the lumbar   spine with facet arthropathy, vertebral body osteophyte formation, and disc height loss.  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     Pt reports today for acute LBP after a fall on 2/2/25. Symptoms are consistent with SIJ involvement and/or disc involvement. Pt reports this has been affecting her sleep at night and she has needed to take Aleve. The pain starts up again when she gets up in the morning. X-ray was 2/7/25 and  "will likely be getting an MRI per chart review. Pt was prescribed Medrol Dosepak and muscle relaxer to help with the symptoms, but pt has not noticed any benefit yet. Pt reports if she lays down (flat on back or on side, putting pillow between knees when on her side -- has to wait and then it will start to subside), the pain will subside for the most part (down to 2-3/10 pain) but as soon as she gets up, it's up to a 6 or 7/10. Has to work in the office, 40 hours, up and down. Feels like it wraps around her thighs, calves and across her knees with L worse than R, more so in the front of the thighs. Urgent care had called her on Sunday Feb 9 regarding possibly doing MRI, though Dr. Sands noted just if there was concern for more fracture. The pain is described as stabbing, numbness and tingling. Before she fell, back in the end of October, she had sciatica in her R side and went to a chiropractor. Noted that this pain she had in previous years, which had lasted about a year before. Auberry that with the chiropractor on the third visit, she did a drop table maneuver (November 11th) which shifted the pain more to her L side and felt more intense after that. Then did soft wave therapy for \"regenerating the stem cells to heal\" -- did 9 treatments of this which did improve her pain. The week before she fell, she had some tingling in her R side again. After her fall, the pain got progressively worse about 3-4 days; hadn't even thought about falling the days between the fall and when the pain started. The pain does not change with movement, except when bending over to pick something off the floor. If she does a squat to pick it up, it's okay. Does have groin pain isn't necessarily new. Has been taking Tylenol every 2 hours. LBP as well but more so in the legs, shooting down her legs.     Per chart review: \"Recommend MRI of the lumbar spine if there is concern for acute fracture given limited sensitivity of radiographs.\"    Per " "chart review: \"Patient was given tylenol 500 mg by mouth during this clinic encounter.  Her pain improved while on this medication.  // Continue the Tylenol for the pain.  (Patient prefers to continue with this medication and not to take Tylenol #3).  // Patient has a follow up appointment with her primary care provider on 2025. // Go to the emergency room if experiencing worsening pain. // If possible, place ice packs over the painful areas.\"     Objective   LUMBAR SPINE EVALUATION  PAIN: per chart review: \"On 2025, patient was kicking when she fell.  The buttocks landed on concrete. No immediate pain..However, there has been gradual-onset, worsening stabbing pain at the bilateral low back, thighs, knees.     Maximum pain ratin-7 out of 10.    Nothing worsens the pain.  The pain is less early in the morning.  However, the pain worsens as the day progresses.  The pain has been worse at the anterior legs than at the posterior legs. \"     INTEGUMENTARY (edema, incisions): WNL   POSTURE:   Pelvic tilt  GAIT:   Gait Deviations: guarded gait pattern, slight forward lean  WEIGHTBEARING ALIGNMENT: slight lateral shift to the left  NON-WEIGHTBEARING ALIGNMENT:   forward head posture and inc. thoracic kyphosis  ROM:   lumbar flexion to mid-shin and extension mild limitation with some discomfort with both  Hip ROM: limited L hip ext/IR  PELVIC/SI SCREEN: L posterior innominate  STRENGTH: 3+/5 L hip flexion, 3+/5 B hip abd/add   NEURAL TENSION: Slump: +, femoral: +  FLEXIBILITY: hypomobility -- HS, gastrocs, glute med/max, piriformis, QL, erector spinae, paraspinals L>R  LUMBAR/HIP Special Tests:   MYOFASCIAL PAIN SYNDROME  trigger point assessment + L piriformis     SPINAL SEGMENTAL CONCLUSIONS: Joint mobility assessment revealed hypomobility with some tenderness at L4-L5, L5-S1, discomfort with lateral mobilization to L sacral base, \"feels good\" to R sacral base and PA mobilizations (grade I) at " L2-3    Assessment & Plan   CLINICAL IMPRESSIONS  Medical Diagnosis: Acute bilateral low back pain without sciatica (M54.50)  - Primary    Treatment Diagnosis: Acute bilateral low back pain without sciatica (M54.50)  - Primary   Impression/Assessment: Patient is a 64 year old - year old female with severe low back pain after a fall in early February 2025 .  The following significant findings have been identified: Pain, Decreased ROM/flexibility, Decreased joint mobility, Decreased strength, Impaired balance, Decreased proprioception, Impaired sensation, Impaired gait, Impaired muscle performance, Decreased activity tolerance, and Impaired posture. These impairments interfere with their ability to perform self care tasks, recreational activities, household mobility, and community mobility as compared to previous level of function. Pt is appropriate for skilled PT intervention.       Clinical Decision Making (Complexity):  Clinical Presentation: Stable/Uncomplicated  Clinical Presentation Rationale: based on medical and personal factors listed in PT evaluation  Clinical Decision Making (Complexity): Low complexity    PLAN OF CARE  Treatment Interventions:  Modalities: Biofeedback, Dry Needling, E-stim, Mechanical Traction, Ultrasound  Interventions: Gait Training, Manual Therapy, Neuromuscular Re-education, Therapeutic Activity, Therapeutic Exercise, Self-Care/Home Management      Long Term Goals     PT Goal 1  Goal Identifier: HEP  Goal Description: Pt will be independent with HEP for self-management of symptoms  Rationale: to maximize safety and independence within the home  Goal Progress: progressing  Target Date: 03/17/25  PT Goal 2  Goal Identifier: lumbar stability  Goal Description: Enhance lumbar stability by performing a series of functional movements (e.g., squats, deadlifts) with correct biomechanics in 90% of attempts over the next 6 weeks  Rationale: to maximize safety and independence with performance of  ADLs and functional tasks  Goal Progress: progressing  Target Date: 03/31/25  PT Goal 3  Goal Identifier: flexibility  Goal Description: Improve overall flexibility by achieving a 20% increase in range of motion in the lumbar spine and hips within 12 weeks  Rationale: to maximize safety and independence with performance of ADLs and functional tasks  Goal Progress: progressing  Target Date: 05/12/25  PT Goal 4  Goal Identifier: SOFÍA  Goal Description: Improve the Oswestry Disability Index (SOFÍA) score by 25% within 12 weeks through targeted physical therapy interventions aimed at enhancing mobility, reducing pain, and increasing overall functional independence in daily activities.  Rationale: to maximize safety and independence with performance of ADLs and functional tasks  Goal Progress: progressing; SOFÍA 40 IE  Target Date: 05/12/25      Frequency of Treatment: 1x/week  Duration of Treatment: 90 days    Education Assessment:   Learner/Method: Patient  Education Comments: Education regarding mechanisms and rationale for physical therapy interventions selected to address their goals. Discussed self management strategies for ways patient can actively support progress towards goals. Education regarding activity precautions/restrictions. Addressed patients questions and concerns to their satisfaction prior to completion of visit.    Risks and benefits of evaluation/treatment have been explained.   Patient/Family/caregiver agrees with Plan of Care.     Evaluation Time:     PT Eval, Low Complexity Minutes (78140): 12     Signing Clinician: Sarahi Garcia PT

## 2025-02-19 ENCOUNTER — PATIENT OUTREACH (OUTPATIENT)
Dept: CARE COORDINATION | Facility: CLINIC | Age: 65
End: 2025-02-19
Payer: COMMERCIAL

## 2025-02-20 ENCOUNTER — OFFICE VISIT (OUTPATIENT)
Dept: PHYSICAL MEDICINE AND REHAB | Facility: CLINIC | Age: 65
End: 2025-02-20
Attending: INTERNAL MEDICINE
Payer: COMMERCIAL

## 2025-02-20 VITALS
HEART RATE: 75 BPM | WEIGHT: 130 LBS | DIASTOLIC BLOOD PRESSURE: 88 MMHG | HEIGHT: 64 IN | SYSTOLIC BLOOD PRESSURE: 147 MMHG | BODY MASS INDEX: 22.2 KG/M2

## 2025-02-20 DIAGNOSIS — M54.50 ACUTE BILATERAL LOW BACK PAIN WITHOUT SCIATICA: ICD-10-CM

## 2025-02-20 DIAGNOSIS — M54.16 LUMBAR RADICULOPATHY: Primary | ICD-10-CM

## 2025-02-20 RX ORDER — CYCLOBENZAPRINE HCL 10 MG
10 TABLET ORAL 3 TIMES DAILY PRN
Qty: 45 TABLET | Refills: 0 | Status: SHIPPED | OUTPATIENT
Start: 2025-02-20

## 2025-02-20 ASSESSMENT — PAIN SCALES - GENERAL: PAINLEVEL_OUTOF10: MODERATE PAIN (5)

## 2025-02-20 NOTE — PROGRESS NOTES
ASSESSMENT: Crys Quarles is a 64 year old female who presents for consultation at the request of PCP Nova Sands, who presents today for new patient evaluation of:    -Acute bilateral low back pain without sciatica     Patient is neurologically intact on exam. No myelopathic or red flag symptoms. Bilateral sciatica and back pain after a fall, L>R leg. While she does not have back pain with ROM exercises nor point tenderness I would still recommend a lumbar MRI for treatment planning because if there is a fracture (given her hx osteoporosis) it would change her treatment quite a bit (activity restrictions, possible brace, and surveillance xr while healing) versus treatment for lumbar radiculopathy (PT now). Recommended increasing flexeril to 10mg. Recommended taking aleve twice daily with tylenol given extended relief experienced yesterday when doing so. We talked about an rx nsaid, which we would pursue if relief is limited. We talked about possible gabapentin and possible lumbar epidural steroid injections. We will call with results and discuss options          2/16/2025    10:46 AM   OSWESTRY DISABILITY INDEX   Count 9    Sum 18    Oswestry Score (%) 40 %        Patient-reported            Diagnoses and all orders for this visit:  Lumbar radiculopathy  -     cyclobenzaprine (FLEXERIL) 10 MG tablet; Take 1 tablet (10 mg) by mouth 3 times daily as needed.  -     MR Lumbar Spine w/o Contrast; Future  Acute bilateral low back pain without sciatica  -     Spine  Referral      PLAN:  Reviewed spine anatomy and disease process. Discussed diagnosis and treatment options with the patient today. A shared decision making model was used.  The patient's values and choices were respected. The following represents what was discussed and decided upon by the provider and the patient.      -DIAGNOSTIC TESTS:  Images were personally reviewed and interpreted and explained to patient today using a spine  model.   -Lumbar MRI without contrast orders placed      -PHYSICAL THERAPY:    --hold off on PT referral until results available        -MEDICATIONS:    --continue aleve and tylenol. Take these together. Call us if relief not lasting and we could consider an rx nsaid.  -increase flexeril to 10mg tid prn    -we talked about the role of gabapentin but would hold off until imaging results available    -INTERVENTIONS:    -Discussed the role of injections with patient today. Patient would be a good candidate in the future for either epidural steroid injections or medial branch blocks if indicated based on symptoms and supported by imaging results    -PATIENT EDUCATION:  Total time of 40 minutes, on the day of service, spent with the patient, reviewing the chart, placing orders, and documenting.   -Today we also discussed the pros and cons of the current treatment plan.    -FOLLOW-UP:   we will call with imaging results    Advised patient to call the [facility] if symptoms worsen, new numbness or weakness develops in the legs, or if they develop new or worsening problems controlling bladder or bowel function.   ______________________________________________________________________    SUBJECTIVE:    HPI:  Crys Betancur Willam  Is a 64 year old female hx osteoporosis, asthma, sensorineural hearing loss bilateral, Immune thrombocytopenia sp splenectomy in 1984, elevated liver enzymes who presents today for new patient evaluation of Acute bilateral low back pain without sciatica     Acute onset bilateral sciatica to the lateral ankles since a fall on 2/2/25 onto the buttocks on concrete. Numbness and tingling in the same distribution. Left leg is worse. The pain is tolerable when lying down. She is able to sleep fine overnight. Within 10 mins of waking up in the morning however her pain comes on. She does not feel like her range of motion is impaired. Her legs feel weak because of the pain, but she does not note any  focal weakness. No loss of bowel or bladder function.     She has been taking tylenol and flexeril rotating. The flexeril does not seem to do anything. She was given a medrol dose pack which she finished yesterday and this did not seem to make any difference. She has taken aleve for about a day, but tries to stay away from nsaids in general due to hx elevated liver enzymes for which she was hospitalized and treated with abx. She notes that she took tylenol and aleve together yesterday and this lasted significantly long time.     She has previously had sciatica quite a few years ago, and then it came back at the end of October of 2024 and she saw a chiropractor for nov-January.     She does have osteoporosis thought to be related to having to be on po steroids for treatment of ITP some years ago.    She has not had any steroid injections or previous spine surgeries    -Treatment to Date:     -Medications:    Current Outpatient Medications   Medication Sig Dispense Refill    cyclobenzaprine (FLEXERIL) 10 MG tablet Take 1 tablet (10 mg) by mouth 3 times daily as needed. 45 tablet 0    calcium carbonate (OS-ROBERT) 600 MG tablet Take by mouth daily (Patient not taking: Reported on 5/14/2024)      Calcium Citrate-Vitamin D (CITRACAL + D PO) Take 600 mg by mouth 2 times daily 1 TABLET 2 TIMES DAILY      cyclobenzaprine (FLEXERIL) 5 MG tablet Take 1 tablet (5 mg) by mouth 3 times daily as needed for muscle spasms. 30 tablet 0    fluticasone (FLOVENT HFA) 110 MCG/ACT inhaler Inhale 1 puff into the lungs 2 times daily as needed       methylPREDNISolone (MEDROL DOSEPAK) 4 MG tablet therapy pack Follow Package Directions (Patient not taking: Reported on 2/20/2025) 21 tablet 0    Multiple Vitamins-Minerals (MULTIVITAMIN ADULTS PO)       RISEdronate (ACTONEL) 35 MG tablet Take 1 tablet (35 mg) by mouth every 7 days 12 tablet 0    VENTOLIN  (90 Base) MCG/ACT inhaler INHALE 2 PUFFS BY MOUTH INTO THE LUNGS EVERY 6 HOURS 18 g 0  "    No current facility-administered medications for this visit.       Allergies   Allergen Reactions    Fosamax [Alendronate] Other (See Comments)     elevated liver enzymes        Past Medical History:   Diagnosis Date    History of ITP     s/p splenectomy in 1984    Mild intermittent asthma     Osteoporosis     Sensorineural hearing loss, bilateral     wears hearing aids        Patient Active Problem List   Diagnosis    History of ITP    Osteoporosis    Mild intermittent asthma    Sensorineural hearing loss, bilateral    Acute bilateral low back pain without sciatica    Bilateral leg pain       Past Surgical History:   Procedure Laterality Date    BREAST BIOPSY, RT/LT Left     benign    CONIZATION  01/01/1991    JENNIFER III    LASER TX, CERVICAL  12/23/1991    Laser TX Cervical JENNIFER 3 neg margins, no invasion    SPLENECTOMY, TOTAL  01/01/1984    ITP       Family History   Problem Relation Age of Onset    Lymphoma Mother         with ? spread to breast    Brain Tumor Father         with ? mets to lung    Diabetes Type 1 Sister     Hypothyroidism Sister     Colon Cancer Maternal Aunt     Diabetes Type 2  No family hx of     Myocardial Infarction No family hx of     Cerebrovascular Disease No family hx of     Coronary Artery Disease Early Onset No family hx of     Ovarian Cancer No family hx of        Reviewed past medical, surgical, and family history with patient found on new patient intake packet located in EMR Media tab.     SOCIAL HX: alcohol use, nonsmoker, no heavy drinking, no rec drug use    ROS: positive for ringing in ears, joint pain, muscle pain, sciatica. Specifically negative for bowel/bladder dysfunction, balance changes, headache, dizziness, foot drop, fevers, chills, appetite changes, nausea/vomiting, unexplained weight loss. Otherwise 13 systems reviewed are negative. Please see the patient's intake questionnaire from today for details.    OBJECTIVE:  BP (!) 147/88   Pulse 75   Ht 5' 4\" (1.626 m)   " Wt 130 lb (59 kg)   LMP 03/06/2008   BMI 22.31 kg/m      PHYSICAL EXAMINATION:    --CONSTITUTIONAL:  Vital signs as above.  No acute distress.  The patient is well nourished and well groomed.  --PSYCHIATRIC:  Appropriate mood and affect. The patient is awake, alert, oriented to person, place, time and answering questions appropriately with clear speech.    --SKIN:  Skin over the face, bilateral lower extremities, and posterior torso is clean, dry, intact without rashes.    --RESPIRATORY: Normal rhythm and effort. No abnormal accessory muscle breathing patterns noted.   --ABDOMINAL:  Non-distended.    --GROSS MOTOR: Gait is non-antalgic. Easily arises from a seated position. Toe walking and heel walking are normal without significant difficulty.      --LOWER EXTREMITY MOTOR TESTING:  Hip flexion: right 5/5, left 5/5  Quads: right 5/5, left 5/5  Hamstrings: right 5/5, left 5/5  Dorsiflexion: right 5/5, left 5/5  Plantar flexion: right 5/5, left 5/5    Great toe MTP extension/EHL: right 5/5, left 5/5    --NEUROLOGICAL:  2/4 patellar and achilles reflexes bilaterally. Sensation to light touch is intact throughout both lower extremities. Babinski is negative. No clonus.    --MUSCULOSKELETAL: Lumbar spine inspection reveals no evidence of deformity. Range of motion is not limited in lumbar flexion, extension, rotation. No point tenderness to palpation lumbar spine. No paraspinal musculature tenderness.     Straight leg raising is negative.    --SACROILIAC JOINT: One finger point test was negative. Negative SI joint tenderness to palpation bilaterally.    --VASCULAR:  Bilateral lower extremities are warm without any discoloration.  There is no pitting edema of the bilateral lower extremities.    RESULTS:   Prior medical records from [facility]where were reviewed today.    Imaging: Spine imaging was personally reviewed and interpreted today. The images were shown to the patient and the findings were  explained using a spine model.      XR Lumbar Spine 2/3 Views    Result Date: 2/8/2025  EXAM: XR LUMBAR SPINE 2/3 VIEWS LOCATION: [facility] DATE: 2/7/2025 INDICATION: Patient has a history of osteoporosis and she fell on February 2, 2025.  Her buttocks landed on concrete.  She has been complaining of bilateral low back pain, bilateral buttock pain, and pain at the both thighs and both lower legs since the fall. COMPARISON: None.     IMPRESSION:  Diffuse osseous demineralization. Dextrocurvature centered around L1. Mild stepwise retrolisthesis from L1-S1. Vertebral body heights within normal limits. No discernible acute fracture. Moderate degenerative changes throughout the lumbar spine with facet arthropathy, vertebral body osteophyte formation, and disc height loss. Recommend MRI of the lumbar spine if there is concern for acute fracture given limited sensitivity of radiographs.    XR Pelvis and Hip Bilateral 2 Views    Result Date: 2/8/2025  EXAM: XR PELVIS AND HIP BILATERAL 2 VIEWS LOCATION: [facility] DATE: 2/7/2025 INDICATION: Patient fell on February 2, 2025, and complains of bilateral low back pain, bilateral buttock, thigh and lower leg pain.  She has a history of chronic bilateral hip pain. COMPARISON: None.     IMPRESSION: No acute fracture or malalignment. Mild degenerative changes throughout the pelvis. Severe degenerative changes of the lower lumbar spine. Radiodensities in right hemiabdomen likely represent gallstones.        This note was dictated using voice recognition software. Any grammatical or context distortions are unintentional and inherent to the software.       Marleni FREDERICKP-C  [facility]  O. [phone]

## 2025-02-20 NOTE — LETTER
2/20/2025      Crys Quarles  [address] [facility] [address]      Dear Colleague,    Thank you for referring your patient, Crys Quarles, to the [facility]. Please see a copy of my visit note below.    ASSESSMENT: Crys Quarles is a 64 year old female who presents for consultation at the request of PCP Nova Sands, who presents today for new patient evaluation of:    -Acute bilateral low back pain without sciatica     Patient is neurologically intact on exam. No myelopathic or red flag symptoms. Bilateral sciatica and back pain after a fall, L>R leg. While she does not have back pain with ROM exercises nor point tenderness I would still recommend a lumbar MRI for treatment planning because if there is a fracture (given her hx osteoporosis) it would change her treatment quite a bit (activity restrictions, possible brace, and surveillance xr while healing) versus treatment for lumbar radiculopathy (PT now). Recommended increasing flexeril to 10mg. Recommended taking aleve twice daily with tylenol given extended relief experienced yesterday when doing so. We talked about an rx nsaid, which we would pursue if relief is limited. We talked about possible gabapentin and possible lumbar epidural steroid injections. We will call with results and discuss options          2/16/2025    10:46 AM   OSWESTRY DISABILITY INDEX   Count 9    Sum 18    Oswestry Score (%) 40 %        Patient-reported            Diagnoses and all orders for this visit:  Lumbar radiculopathy  -     cyclobenzaprine (FLEXERIL) 10 MG tablet; Take 1 tablet (10 mg) by mouth 3 times daily as needed.  -     MR Lumbar Spine w/o Contrast; Future  Acute bilateral low back pain without sciatica  -     Spine  Referral      PLAN:  Reviewed spine anatomy and disease process. Discussed diagnosis and treatment options with the patient today. A shared decision making model was used.   The patient's values and choices were respected. The following represents what was discussed and decided upon by the provider and the patient.      -DIAGNOSTIC TESTS:  Images were personally reviewed and interpreted and explained to patient today using a spine model.   -Lumbar MRI without contrast orders placed      -PHYSICAL THERAPY:    --hold off on PT referral until results available        -MEDICATIONS:    --continue aleve and tylenol. Take these together. Call us if relief not lasting and we could consider an rx nsaid.  -increase flexeril to 10mg tid prn    -we talked about the role of gabapentin but would hold off until imaging results available    -INTERVENTIONS:    -Discussed the role of injections with patient today. Patient would be a good candidate in the future for either epidural steroid injections or medial branch blocks if indicated based on symptoms and supported by imaging results    -PATIENT EDUCATION:  Total time of 40 minutes, on the day of service, spent with the patient, reviewing the chart, placing orders, and documenting.   -Today we also discussed the pros and cons of the current treatment plan.    -FOLLOW-UP:   we will call with imaging results    Advised patient to call the Spine Center if symptoms worsen, new numbness or weakness develops in the legs, or if they develop new or worsening problems controlling bladder or bowel function.   ______________________________________________________________________    SUBJECTIVE:    HPI:  Crys Arreolaradha Willam  Is a 64 year old female hx osteoporosis, asthma, sensorineural hearing loss bilateral, Immune thrombocytopenia sp splenectomy in 1984, elevated liver enzymes who presents today for new patient evaluation of Acute bilateral low back pain without sciatica     Acute onset bilateral sciatica to the lateral ankles since a fall on 2/2/25 onto the buttocks on concrete. Numbness and tingling in the same distribution. Left leg is worse. The pain  is tolerable when lying down. She is able to sleep fine overnight. Within 10 mins of waking up in the morning however her pain comes on. She does not feel like her range of motion is impaired. Her legs feel weak because of the pain, but she does not note any focal weakness. No loss of bowel or bladder function.     She has been taking tylenol and flexeril rotating. The flexeril does not seem to do anything. She was given a medrol dose pack which she finished yesterday and this did not seem to make any difference. She has taken aleve for about a day, but tries to stay away from nsaids in general due to hx elevated liver enzymes for which she was hospitalized and treated with abx. She notes that she took tylenol and aleve together yesterday and this lasted significantly long time.     She has previously had sciatica quite a few years ago, and then it came back at the end of October of 2024 and she saw a chiropractor for nov-January.     She does have osteoporosis thought to be related to having to be on po steroids for treatment of ITP some years ago.    She has not had any steroid injections or previous spine surgeries    -Treatment to Date:     -Medications:    Current Outpatient Medications   Medication Sig Dispense Refill     cyclobenzaprine (FLEXERIL) 10 MG tablet Take 1 tablet (10 mg) by mouth 3 times daily as needed. 45 tablet 0     calcium carbonate (OS-ROBERT) 600 MG tablet Take by mouth daily (Patient not taking: Reported on 5/14/2024)       Calcium Citrate-Vitamin D (CITRACAL + D PO) Take 600 mg by mouth 2 times daily 1 TABLET 2 TIMES DAILY       cyclobenzaprine (FLEXERIL) 5 MG tablet Take 1 tablet (5 mg) by mouth 3 times daily as needed for muscle spasms. 30 tablet 0     fluticasone (FLOVENT HFA) 110 MCG/ACT inhaler Inhale 1 puff into the lungs 2 times daily as needed        methylPREDNISolone (MEDROL DOSEPAK) 4 MG tablet therapy pack Follow Package Directions (Patient not taking: Reported on 2/20/2025) 21  tablet 0     Multiple Vitamins-Minerals (MULTIVITAMIN ADULTS PO)        RISEdronate (ACTONEL) 35 MG tablet Take 1 tablet (35 mg) by mouth every 7 days 12 tablet 0     VENTOLIN  (90 Base) MCG/ACT inhaler INHALE 2 PUFFS BY MOUTH INTO THE LUNGS EVERY 6 HOURS 18 g 0     No current facility-administered medications for this visit.       Allergies   Allergen Reactions     Fosamax [Alendronate] Other (See Comments)     elevated liver enzymes        Past Medical History:   Diagnosis Date     History of ITP     s/p splenectomy in 1984     Mild intermittent asthma      Osteoporosis      Sensorineural hearing loss, bilateral     wears hearing aids        Patient Active Problem List   Diagnosis     History of ITP     Osteoporosis     Mild intermittent asthma     Sensorineural hearing loss, bilateral     Acute bilateral low back pain without sciatica     Bilateral leg pain       Past Surgical History:   Procedure Laterality Date     BREAST BIOPSY, RT/LT Left     benign     CONIZATION  01/01/1991    JENNIFER III     LASER TX, CERVICAL  12/23/1991    Laser TX Cervical JENNIFER 3 neg margins, no invasion     SPLENECTOMY, TOTAL  01/01/1984    ITP       Family History   Problem Relation Age of Onset     Lymphoma Mother         with ? spread to breast     Brain Tumor Father         with ? mets to lung     Diabetes Type 1 Sister      Hypothyroidism Sister      Colon Cancer Maternal Aunt      Diabetes Type 2  No family hx of      Myocardial Infarction No family hx of      Cerebrovascular Disease No family hx of      Coronary Artery Disease Early Onset No family hx of      Ovarian Cancer No family hx of        Reviewed past medical, surgical, and family history with patient found on new patient intake packet located in EMR Media tab.     SOCIAL HX: alcohol use, nonsmoker, no heavy drinking, no rec drug use    ROS: positive for ringing in ears, joint pain, muscle pain, sciatica. Specifically negative for bowel/bladder dysfunction, balance  "changes, headache, dizziness, foot drop, fevers, chills, appetite changes, nausea/vomiting, unexplained weight loss. Otherwise 13 systems reviewed are negative. Please see the patient's intake questionnaire from today for details.    OBJECTIVE:  BP (!) 147/88   Pulse 75   Ht 5' 4\" (1.626 m)   Wt 130 lb (59 kg)   LMP 03/06/2008   BMI 22.31 kg/m      PHYSICAL EXAMINATION:    --CONSTITUTIONAL:  Vital signs as above.  No acute distress.  The patient is well nourished and well groomed.  --PSYCHIATRIC:  Appropriate mood and affect. The patient is awake, alert, oriented to person, place, time and answering questions appropriately with clear speech.    --SKIN:  Skin over the face, bilateral lower extremities, and posterior torso is clean, dry, intact without rashes.    --RESPIRATORY: Normal rhythm and effort. No abnormal accessory muscle breathing patterns noted.   --ABDOMINAL:  Non-distended.    --GROSS MOTOR: Gait is non-antalgic. Easily arises from a seated position. Toe walking and heel walking are normal without significant difficulty.      --LOWER EXTREMITY MOTOR TESTING:  Hip flexion: right 5/5, left 5/5  Quads: right 5/5, left 5/5  Hamstrings: right 5/5, left 5/5  Dorsiflexion: right 5/5, left 5/5  Plantar flexion: right 5/5, left 5/5    Great toe MTP extension/EHL: right 5/5, left 5/5    --NEUROLOGICAL:  2/4 patellar and achilles reflexes bilaterally. Sensation to light touch is intact throughout both lower extremities. Babinski is negative. No clonus.    --MUSCULOSKELETAL: Lumbar spine inspection reveals no evidence of deformity. Range of motion is not limited in lumbar flexion, extension, rotation. No point tenderness to palpation lumbar spine. No paraspinal musculature tenderness.     Straight leg raising is negative.    --SACROILIAC JOINT: One finger point test was negative. Negative SI joint tenderness to palpation bilaterally.    --VASCULAR:  Bilateral lower extremities are warm without any " discoloration.  There is no pitting edema of the bilateral lower extremities.    RESULTS:   Prior medical records from [facility] and Care Everywhere were reviewed today.    Imaging: Spine imaging was personally reviewed and interpreted today. The images were shown to the patient and the findings were explained using a spine model.      XR Lumbar Spine 2/3 Views    Result Date: 2/8/2025  EXAM: XR LUMBAR SPINE 2/3 VIEWS LOCATION: [facility] DATE: 2/7/2025 INDICATION: Patient has a history of osteoporosis and she fell on February 2, 2025.  Her buttocks landed on concrete.  She has been complaining of bilateral low back pain, bilateral buttock pain, and pain at the both thighs and both lower legs since the fall. COMPARISON: None.     IMPRESSION:  Diffuse osseous demineralization. Dextrocurvature centered around L1. Mild stepwise retrolisthesis from L1-S1. Vertebral body heights within normal limits. No discernible acute fracture. Moderate degenerative changes throughout the lumbar spine with facet arthropathy, vertebral body osteophyte formation, and disc height loss. Recommend MRI of the lumbar spine if there is concern for acute fracture given limited sensitivity of radiographs.    XR Pelvis and Hip Bilateral 2 Views    Result Date: 2/8/2025  EXAM: XR PELVIS AND HIP BILATERAL 2 VIEWS LOCATION: [facility] DATE: 2/7/2025 INDICATION: Patient fell on February 2, 2025, and complains of bilateral low back pain, bilateral buttock, thigh and lower leg pain.  She has a history of chronic bilateral hip pain. COMPARISON: None.     IMPRESSION: No acute fracture or malalignment. Mild degenerative changes throughout the pelvis. Severe degenerative changes of the lower lumbar spine. Radiodensities in right hemiabdomen likely represent gallstones.        This note was dictated using voice recognition software. Any grammatical or context distortions are unintentional and inherent to the  software.       Marleni ORTIZC  [facility]  O. [phone]             Again, thank you for allowing me to participate in the care of your patient.        Sincerely,        JAZMINE Bedoya CNP    Electronically signed

## 2025-02-20 NOTE — PATIENT INSTRUCTIONS
Flexeril 10mg (muscle relaxant medication) has been prescribed today. Please take one every 8 hrs as needed for muscle spasms. This medication may cause drowsiness. Please do not work or drive while taking this medication until you know how it affects you. If it does make you drowsy, you should only take it before bedtime or at times that you do not have to work/drive.     Take tylenol and aleve at once, see how long the relief lasts. You can take aleve twice daily max. You can take a maximum of 4000mg of tylenol per day, so this is a max of 8 extra strength tabs per 24hr period    Imaging (Xray, CT, or MRI) has been ordered today.   Radiology will call you to schedule. Please call below if you do not hear from them in the next couple of days.     [facility] Radiology Scheduling:  Please call [phone] to schedule your image(s) (select option #1).    There are 3 different locations you may go as a walk-in to have same-day Xrays done:    [facility]  [address] [place] [address] Imaging - [place]  [address], [address]   [place] [address] [place] [address]       Radicular Pain    Radicular pain in either the arm or leg is usually from a bulging disc in the spine. A portion of the herniated disc may press against the nerves as the nerves exit the spine. This causes pain which is felt down the arm or leg. Other causes of radicular pain may include:  Fractures.  Heart disease.  Cancer.  An abnormal and usually degenerative state of the nervous system or nerves (neuropathy).    In most cases, radicular pain is treated without imaging unless symptoms do not start to improve. If that is the case, your provider may order a CT or MRI scan to determine the cause.     Nerves in the cervical spine (neck) may cause radicular pain into the outer shoulder and down the arm. It can spread down to the thumb and  "fingers. The symptoms vary depending on which nerve root has been affected. In most cases, radicular pain improves with conservative treatment such as physical therapy, cervical traction, medications, and epidural steroid injections. A program for neck rehabilitation with stretching and strengthening exercises is an important part of management. Treatment may take months, and surgery may be considered as a last resort if the symptoms do not improve.    Nerves in the lumbar spine (lower back) may cause radicular pain into the hip and down the leg. The commonly used term for this type of pain is \"sciatica\". Conservative treatment is also recommended for this problem. Most patients feel better after 2 to 4 weeks of rest and other supportive care. You should avoid bending, lifting, and all other activities which can make your pain worse. Physical therapy, traction, medications, and epidural steroid injections can be good options to help with your recovery. A program for back injury rehabilitation with stretching and strengthening exercises is an important part of management. Surgery may be considered as a last resort if symptoms do not improve with conservative treatment.     You may take over-the-counter or prescription medicines for pain, discomfort, or fever as directed by your caregiver. Muscle relaxants may help by relieving more severe pain and spasm. Neuropathic medication (such as gabapentin, lyrica, or cymbalta) can help decrease your symptoms of nerve pain as well. Cold or massage can also give significant relief. Spinal manipulation is not recommended as it can increase the degree of disc protrusion. We do not recommend taking narcotic medication such as percocet, oxycodone, norco, dilaudid, or others unless pain is severe and not controlled with any other oral options.    Epidural steroid injections are often effective treatment for radicular pain. These injections deliver strong anti-inflammatory medicine " to the area directly around the nerve root in the space between your back bones (vertebrae). Your provider can give you more information about steroid injections. These injections are most effective when given within two weeks of the onset of acute pain. You should see your provider for follow up care as recommended.     In most cases, radicular pain is treated without imaging unless symptoms do not start to improve. If that is the case, your provider may order a CT or MRI scan to determine the cause.     SEEK IMMEDIATE MEDICAL CARE IF:  You develop increased pain, weakness, or numbness in your arm or leg.  You develop difficulty with bladder or bowel control.  You develop abdominal pain.    Document Released: 01/25/2006 Document Revised: 03/11/2013 Document Reviewed: 04/12/2010  Patient Information  2013 [employer].     ~Please call our [facility] Nurse Navigation line [phone] with any questions or concerns about your treatment plan, if symptoms worsen and you would like to be seen urgently, or if you have any new or worsening numbness, weakness, or problems controlling bladder and bowel function.  ~You are also welcome to contact Marleni Busch via [facility], but please be aware that responses to [employer] message may take 2-3 days due to the high volume of patients seen in clinic.

## 2025-02-26 ENCOUNTER — THERAPY VISIT (OUTPATIENT)
Dept: PHYSICAL THERAPY | Facility: REHABILITATION | Age: 65
End: 2025-02-26
Payer: COMMERCIAL

## 2025-02-26 DIAGNOSIS — M79.604 BILATERAL LEG PAIN: ICD-10-CM

## 2025-02-26 DIAGNOSIS — M54.50 ACUTE BILATERAL LOW BACK PAIN WITHOUT SCIATICA: Primary | ICD-10-CM

## 2025-02-26 DIAGNOSIS — M79.605 BILATERAL LEG PAIN: ICD-10-CM

## 2025-02-26 PROCEDURE — 97110 THERAPEUTIC EXERCISES: CPT | Mod: GP | Performed by: PHYSICAL THERAPIST

## 2025-02-26 PROCEDURE — 97112 NEUROMUSCULAR REEDUCATION: CPT | Mod: GP | Performed by: PHYSICAL THERAPIST

## 2025-03-01 ENCOUNTER — HOSPITAL ENCOUNTER (OUTPATIENT)
Dept: MRI IMAGING | Facility: HOSPITAL | Age: 65
Discharge: HOME | End: 2025-03-01
Attending: NURSE PRACTITIONER | Admitting: NURSE PRACTITIONER
Payer: COMMERCIAL

## 2025-03-01 DIAGNOSIS — M54.16 LUMBAR RADICULOPATHY: ICD-10-CM

## 2025-03-01 PROCEDURE — 72148 MRI LUMBAR SPINE W/O DYE: CPT

## 2025-03-04 ENCOUNTER — HOSPITAL ENCOUNTER (OUTPATIENT)
Dept: MAMMOGRAPHY | Facility: CLINIC | Age: 65
Discharge: HOME | End: 2025-03-04
Attending: INTERNAL MEDICINE
Payer: COMMERCIAL

## 2025-03-04 DIAGNOSIS — Z12.31 VISIT FOR SCREENING MAMMOGRAM: ICD-10-CM

## 2025-03-04 PROBLEM — M79.605 BILATERAL LEG PAIN: Status: RESOLVED | Noted: 2025-02-17 | Resolved: 2025-03-04

## 2025-03-04 PROBLEM — M79.604 BILATERAL LEG PAIN: Status: RESOLVED | Noted: 2025-02-17 | Resolved: 2025-03-04

## 2025-03-04 PROBLEM — M54.50 ACUTE BILATERAL LOW BACK PAIN WITHOUT SCIATICA: Status: RESOLVED | Noted: 2025-02-17 | Resolved: 2025-03-04

## 2025-03-04 PROCEDURE — 77067 SCR MAMMO BI INCL CAD: CPT

## 2025-03-04 PROCEDURE — 77063 BREAST TOMOSYNTHESIS BI: CPT

## 2025-03-05 ENCOUNTER — TELEPHONE (OUTPATIENT)
Dept: PHYSICAL MEDICINE AND REHAB | Facility: CLINIC | Age: 65
End: 2025-03-05

## 2025-03-05 ENCOUNTER — MYC MEDICAL ADVICE (OUTPATIENT)
Dept: INTERNAL MEDICINE | Facility: CLINIC | Age: 65
End: 2025-03-05

## 2025-03-05 NOTE — TELEPHONE ENCOUNTER
----- Message from Marleni Busch sent at 3/5/2025 12:38 PM CST -----  Please let Crys know that her lumbar MRI does not show any fractures, which is good news. She has degenerative arthritis wear and tear changes of the discs and joints, with disc bulges that are causing varying degrees of narrowing around the nerves at most levels. I would suggest completing a course of PT and following up with me afterwards if no relief. We could add gabapentin as an option for pain control, if her relief has not yet improved with aleve/tylenol

## 2025-03-05 NOTE — TELEPHONE ENCOUNTER
Phone call to patient to review results and provider's recommendations. Results given and explained. Discussed the Gabapentin as an option  for pain management. Also discussed PSP recommending completing a full course of PT and follow up if no improvement, sooner if worse.     Patient asks if there is other medication than Gabapentin to try. She has found a couple more hours of relief with the combination of Aleve and Tylenol. Also asks about getting injection along with PT; she did see her PCP this AM and it was mentioned then. She does have 3 more sessions of PT through March scheduled.

## 2025-03-05 NOTE — TELEPHONE ENCOUNTER
We could pursue an injection if pain has remained severe, limiting her activity. Would suggest continuing PT however.    She has multiple levels of narrowing around the nerve roots. It looks worst at L5-S1.  I would suggest a alexander TF SILVIO L5-S1 as an initial step. Please let her know that it is possible depending on her insurance type that they may deny an injection if she has not had a full course of PT

## 2025-03-05 NOTE — TELEPHONE ENCOUNTER
Talked to patient on phone this morning.    Reviewed MRI and recommended restarting PT.    Rescheduled physical for next month.

## 2025-03-10 ENCOUNTER — THERAPY VISIT (OUTPATIENT)
Dept: PHYSICAL THERAPY | Facility: REHABILITATION | Age: 65
End: 2025-03-10
Payer: COMMERCIAL

## 2025-03-10 DIAGNOSIS — M51.369 BULGING LUMBAR DISC: Primary | ICD-10-CM

## 2025-03-17 ENCOUNTER — THERAPY VISIT (OUTPATIENT)
Dept: PHYSICAL THERAPY | Facility: REHABILITATION | Age: 65
End: 2025-03-17
Payer: COMMERCIAL

## 2025-03-17 DIAGNOSIS — M54.50 ACUTE BILATERAL LOW BACK PAIN WITHOUT SCIATICA: Primary | ICD-10-CM

## 2025-03-17 PROCEDURE — 97110 THERAPEUTIC EXERCISES: CPT | Mod: GP | Performed by: PHYSICAL THERAPIST

## 2025-03-17 PROCEDURE — 97535 SELF CARE MNGMENT TRAINING: CPT | Mod: GP | Performed by: PHYSICAL THERAPIST

## 2025-04-03 DIAGNOSIS — M81.0 AGE-RELATED OSTEOPOROSIS WITHOUT CURRENT PATHOLOGICAL FRACTURE: ICD-10-CM

## 2025-04-06 RX ORDER — RISEDRONATE SODIUM 35 MG/1
35 TABLET, FILM COATED ORAL
Qty: 12 TABLET | Refills: 0 | Status: SHIPPED | OUTPATIENT
Start: 2025-04-06

## 2025-04-07 PROBLEM — M51.369 BULGING LUMBAR DISC: Status: RESOLVED | Noted: 2025-03-10 | Resolved: 2025-04-07

## 2025-04-08 SDOH — HEALTH STABILITY: PHYSICAL HEALTH: ON AVERAGE, HOW MANY MINUTES DO YOU ENGAGE IN EXERCISE AT THIS LEVEL?: 20 MIN

## 2025-04-08 SDOH — HEALTH STABILITY: PHYSICAL HEALTH: ON AVERAGE, HOW MANY DAYS PER WEEK DO YOU ENGAGE IN MODERATE TO STRENUOUS EXERCISE (LIKE A BRISK WALK)?: 1 DAY

## 2025-04-08 ASSESSMENT — SOCIAL DETERMINANTS OF HEALTH (SDOH): HOW OFTEN DO YOU GET TOGETHER WITH FRIENDS OR RELATIVES?: ONCE A WEEK

## 2025-04-09 ENCOUNTER — OFFICE VISIT (OUTPATIENT)
Dept: INTERNAL MEDICINE | Facility: CLINIC | Age: 65
End: 2025-04-09
Payer: COMMERCIAL

## 2025-04-09 VITALS
SYSTOLIC BLOOD PRESSURE: 142 MMHG | DIASTOLIC BLOOD PRESSURE: 84 MMHG | OXYGEN SATURATION: 97 % | HEART RATE: 74 BPM | BODY MASS INDEX: 21.73 KG/M2 | TEMPERATURE: 97.4 F | WEIGHT: 126.6 LBS

## 2025-04-09 DIAGNOSIS — M81.0 AGE-RELATED OSTEOPOROSIS WITHOUT CURRENT PATHOLOGICAL FRACTURE: ICD-10-CM

## 2025-04-09 DIAGNOSIS — Z86.2 HISTORY OF ITP: ICD-10-CM

## 2025-04-09 DIAGNOSIS — Z00.00 ROUTINE HISTORY AND PHYSICAL EXAMINATION OF ADULT: Primary | ICD-10-CM

## 2025-04-09 DIAGNOSIS — Z13.220 SCREENING FOR CHOLESTEROL LEVEL: ICD-10-CM

## 2025-04-09 DIAGNOSIS — Z13.1 SCREENING FOR DIABETES MELLITUS: ICD-10-CM

## 2025-04-09 DIAGNOSIS — H90.3 SENSORINEURAL HEARING LOSS, BILATERAL: ICD-10-CM

## 2025-04-09 DIAGNOSIS — E55.9 VITAMIN D DEFICIENCY: ICD-10-CM

## 2025-04-09 DIAGNOSIS — J45.20 MILD INTERMITTENT ASTHMA WITHOUT COMPLICATION: ICD-10-CM

## 2025-04-09 PROCEDURE — 3077F SYST BP >= 140 MM HG: CPT | Performed by: INTERNAL MEDICINE

## 2025-04-09 PROCEDURE — 3079F DIAST BP 80-89 MM HG: CPT | Performed by: INTERNAL MEDICINE

## 2025-04-09 PROCEDURE — 99396 PREV VISIT EST AGE 40-64: CPT | Performed by: INTERNAL MEDICINE

## 2025-04-09 RX ORDER — ALBUTEROL SULFATE 90 UG/1
2 INHALANT RESPIRATORY (INHALATION) EVERY 6 HOURS PRN
Qty: 18 G | Refills: 1 | Status: SHIPPED | OUTPATIENT
Start: 2025-04-09

## 2025-04-09 NOTE — PROGRESS NOTES
ASSESSMENT/PLAN                                                       (Z00.00) Routine history and physical examination of adult  (primary encounter diagnosis)  Comment: PMH, PSH, FH, SH, medications, allergies, immunizations, and preventative health measures reviewed and updated as appropriate.  Plan: see below for plans.      (Z86.2) History of ITP  Comment: s/p splenectomy in 1984.   Plan: CBC today.    (M81.0) Age-related osteoporosis without current pathological fracture  Comment: on Actonel; followed by endocrinology.     (J45.20) Mild intermittent asthma without complication  Comment: well-controlled with albuterol as needed.   Plan: continue present management; refills provided.     (H90.3) Sensorineural hearing loss, bilateral  Comment: wears hearing aids.     (Z13.220) Screening for cholesterol level  (Z13.1) Screening for diabetes mellitus  (E55.9) Vitamin D deficiency  Plan: fasting labs ordered - patient to schedule.     Nova Sands MD   [address] [address]  T: [phone], F: [phone]    SUBJECTIVE                                                      Crys Quarles is a very pleasant 64 year old female who presents for a physical.    ROS:  Constitutional: no unintentional weight loss or gain reported; no fevers, chills, or sweats  reported    Cardiovascular: no chest pain, palpitations, or edema reported  Respiratory: no cough, wheezing, shortness of breath, or dyspnea on exertion reported  Gastrointestinal: no nausea, vomiting, constipation, diarrhea, or abdominal pain reported  Genitourinary: no urinary frequency, urgency, dysuria, or hematuria reported  Integumentary: no rash or pruritus reported  Musculoskeletal: no back pain, muscle pain, joint pain, or joint swelling reported  Neurologic: no focal weakness, numbness, or tingling reported  Hematologic: no easy bruising or bleeding reported  Endocrine: no heat or cold intolerance  reported; no polyuria or polydipsia reported  Psychiatric: no anxiety or depression reported    Past Medical History:   Diagnosis Date    History of ITP     s/p splenectomy in 1984    Mild intermittent asthma     Osteoporosis     Sensorineural hearing loss, bilateral     wears hearing aids     Past Surgical History:   Procedure Laterality Date    BREAST BIOPSY, RT/LT Left     benign    CONIZATION  01/01/1991    JENNIFER III    LASER TX, CERVICAL  12/23/1991    Laser TX Cervical JENNIFER 3 neg margins, no invasion    SPLENECTOMY, TOTAL  01/01/1984    ITP     Family History   Problem Relation Age of Onset    Lymphoma Mother         with ? spread to breast    Brain Tumor Father         with ? mets to lung    Diabetes Type 1 Sister     Hypothyroidism Sister     Colon Cancer Maternal Aunt     Diabetes Type 2  No family hx of     Myocardial Infarction No family hx of     Cerebrovascular Disease No family hx of     Coronary Artery Disease Early Onset No family hx of     Ovarian Cancer No family hx of      Social History     Occupational History    Occupation: Pharmacy Technician   Tobacco Use    Smoking status: Never    Smokeless tobacco: Never   Vaping Use    Vaping status: Never Used   Substance and Sexual Activity    Alcohol use: Yes     Comment: rare    Drug use: No    Sexual activity: Not Currently   Social History Narrative    .    2 children.    2 grandchildren.    Active job; no formal exercise.      Allergies   Allergen Reactions    Fosamax [Alendronate] Other (See Comments)     elevated liver enzymes      Current Outpatient Medications   Medication Sig    albuterol (PROAIR HFA/PROVENTIL HFA/VENTOLIN HFA) 108 (90 Base) MCG/ACT inhaler Inhale 2 puffs into the lungs every 6 hours as needed for shortness of breath, wheezing or cough.    Calcium Citrate-Vitamin D (CITRACAL + D PO) Take 600 mg by mouth 2 times daily 1 TABLET 2 TIMES DAILY    fluticasone (FLOVENT HFA) 110 MCG/ACT inhaler Inhale 1 puff into the lungs 2  times daily as needed     Multiple Vitamins-Minerals (MULTIVITAMIN ADULTS PO)     RISEdronate (ACTONEL) 35 MG tablet Take 1 tablet (35 mg) by mouth every 7 days     Immunization History   Administered Date(s) Administered    COVID-19 12+ (MODERNA) 10/15/2023    COVID-19 Bivalent 18+ (Moderna) 10/15/2022    COVID-19 Monovalent 18+ (Moderna) 02/11/2021, 03/10/2021, 11/17/2021    HepB, Unspecified 09/12/2016, 10/11/2016, 04/12/2017    Hepatitis A (VAQTA)(ADULT 19+) 12/27/2016, 06/27/2017    Influenza (IIV3) PF 09/24/2014    Influenza Vaccine 18-64 (Flublok) 10/15/2022    Influenza Vaccine >6 months,quad, PF 09/29/2015, 10/22/2018, 09/20/2020, 10/17/2021, 10/15/2023    Pneumo Conj 13-V (2010&after) 12/27/2016    Pneumococcal 23 valent 10/11/2010, 12/27/2021    RSV (Abrysvo) 03/04/2024    TDAP (Adacel,Boostrix) 12/27/2016    Zoster recombinant adjuvanted (Shingrix) 10/22/2018, 01/22/2019     PREVENTATIVE HEALTH                                                      BMI: within normal limits   Blood pressure: within normal limits   Breast CA screening: up to date   Cervical CA screening: up to date   Colon CA screening: up to date   Lung CA screening: n/a   Dexa: up to date   Screening cholesterol: DUE  Screening diabetes: DUE  STD testing: no risk factors present  Alcohol misuse screening: alcohol use reviewed - no intervention indicated at this time  Immunizations: reviewed;  COVID-19 booster DUE    OBJECTIVE                                                      BP (!) 142/84   Pulse 74   Temp 97.4  F (36.3  C) (Temporal)   Wt 57.4 kg (126 lb 9.6 oz)   LMP 03/06/2008   SpO2 97%   BMI 21.73 kg/m    Constitutional: well-appearing  Head, Ears, and Eyes: normocephalic; normal external auditory canal and pinna; tympanic membranes visualized and normal; normal lids and conjunctivae  Neck: supple, symmetric, no thyromegaly or lymphadenopathy  Respiratory: normal respiratory effort; clear to auscultation  bilaterally  Cardiovascular: regular rate and rhythm; no edema  Gastrointestinal: soft, non-tender, and non-distended; no organomegaly or masses  Musculoskeletal: normal gait and station  Psych: normal judgment and insight; normal mood and affect; recent and remote memory intact    ---  (Note was completed, in part, with [employer] voice-recognition software. Documentation was reviewed, but some grammatical, spelling, and word errors may remain.)

## 2025-04-21 ENCOUNTER — THERAPY VISIT (OUTPATIENT)
Dept: PHYSICAL THERAPY | Facility: REHABILITATION | Age: 65
End: 2025-04-21
Payer: COMMERCIAL

## 2025-04-21 DIAGNOSIS — M54.50 ACUTE BILATERAL LOW BACK PAIN WITHOUT SCIATICA: Primary | ICD-10-CM

## 2025-04-21 PROCEDURE — 97110 THERAPEUTIC EXERCISES: CPT | Mod: GP

## 2025-04-21 PROCEDURE — 97112 NEUROMUSCULAR REEDUCATION: CPT | Mod: GP

## 2025-04-28 ENCOUNTER — THERAPY VISIT (OUTPATIENT)
Dept: PHYSICAL THERAPY | Facility: REHABILITATION | Age: 65
End: 2025-04-28
Payer: COMMERCIAL

## 2025-04-28 DIAGNOSIS — M54.50 ACUTE BILATERAL LOW BACK PAIN WITHOUT SCIATICA: Primary | ICD-10-CM

## 2025-04-28 PROCEDURE — 97112 NEUROMUSCULAR REEDUCATION: CPT | Mod: GP

## 2025-04-28 PROCEDURE — 97110 THERAPEUTIC EXERCISES: CPT | Mod: GP

## 2025-04-28 NOTE — PROGRESS NOTES
PLAN  Continue therapy per current plan of care.    Beginning/End Dates of Progress Note Reporting Period:  02/17/25 to 04/28/2025    Referring Provider:  Pantera Pabon       04/28/25 0500   Appointment Info   Signing clinician's name / credentials Sarahi Garcia DPT   Total/Authorized Visits E&T   Visits Used 7   Medical Diagnosis Acute bilateral low back pain without sciatica (M54.50)  - Primary   PT Tx Diagnosis Acute bilateral low back pain without sciatica (M54.50)  - Primary   Progress Note/Certification   Onset of illness/injury or Date of Surgery 02/02/25  (DOI)   Therapy Frequency 1x/week   Predicted Duration 90 days   Progress Note Due Date 07/26/25   Progress Note Completed Date 02/17/25   GOALS   PT Goals 2;3;4   PT Goal 1   Goal Identifier HEP   Goal Description Pt will be independent with HEP for self-management of symptoms   Rationale to maximize safety and independence within the home   Goal Progress goal met   Target Date 03/17/25   Date Met 04/28/25   PT Goal 2   Goal Identifier lumbar stability   Goal Description Enhance lumbar stability by performing a series of functional movements (e.g., squats, deadlifts) with correct biomechanics in 90% of attempts over the next 6 weeks   Rationale to maximize safety and independence with performance of ADLs and functional tasks   Goal Progress progressing, trialed mechanics today, some difficulty -- often feeling in her low back   Target Date 07/26/25   PT Goal 3   Goal Identifier flexibility   Goal Description Improve overall flexibility by achieving a 20% increase in range of motion in the lumbar spine and hips within 12 weeks   Rationale to maximize safety and independence with performance of ADLs and functional tasks   Goal Progress progressing, tolerating deeper stretching -- target date extended d/t slow progress   Target Date 07/26/25   PT Goal 4   Goal Identifier SOFÍA   Goal Description Improve the Oswestry Disability Index (SOFÍA) score by 25%  within 12 weeks through targeted physical therapy interventions aimed at enhancing mobility, reducing pain, and increasing overall functional independence in daily activities.   Rationale to maximize safety and independence with performance of ADLs and functional tasks   Goal Progress SOFÍA 40 IE, will complete next session   Target Date 07/26/25   Subjective Report   Subjective Report Has been doing better since her flare-up. Some soreness coming in this morning. Some back pain after gardening this weekend, raking leaves then stooping to scoop them into the bag.   Objective Measures   Objective Measures Objective Measure 1;Objective Measure 2;Objective Measure 3   Objective Measure 1   Objective Measure MRI findings: multiple disc issues and foraminal narrowing across various lumbar levels, with left-sided disc herniations at L1-L2 and L5-S1, mild to moderate stenosis at L3-L4, and foraminal narrowing at L4-L5, which may irritate the associated nerve roots.   Details flexion WFL but increases left leg pain, extension limited by 50-75% no leg pain   Objective Measure 2   Objective Measure L sacral ant rotation, more tension in R hip   Details pain 2/10 now mostly RLE: groin/ant thigh, buttocks, low back  -- femoral n testing neg., cont to assess   Objective Measure 3   Objective Measure 4/28/25: difficulty with lifting mechanics including squats, half kneel lift, golfer's lift - reporting pain in her back, reports she cannot necessarily feel her glutes activating, fair coordination with TA.   Details 4/28/25: difficulty feeling buttock muscles activate L>R   Treatment Interventions (PT)   Interventions Therapeutic Procedure/Exercise;Manual Therapy;Therapeutic Activity;Neuromuscular Re-education;Self Care/Home Management   Therapeutic Procedure/Exercise   Therapeutic Procedures: strength, endurance, ROM, flexibility minutes (65927) 12   Ther Proc 1 Discussion of CLOF and VR of HEP, guided HS stretch and QL stretch,  "seated bow & arrow stretch, counter stretch;  trialed several exercises to activate glutes -- pt not necessarily feeling them activating, more so feeling in her low back, particularly when she tries to activate her L buttock muscles. Trialed sidelying clamshells, seated hip IR with band, standing hip abd/ext. Added SL stance with the intention of isolating/clenching glutes unilaterally.   Ther Proc 1 - Details Edu importance of practice tasks that are uncomfortable, scaling them back so that eventually pt may perform with ease. Continuing to activate muscles but knowing the difference between soreness vs irritation   PTRx Ther Proc 1 Standing Extension at Counter Supported   PTRx Ther Proc 1 - Details NT   PTRx Ther Proc 2 Single Knee to Chest   PTRx Ther Proc 2 - Details NT   PTRx Ther Proc 3 Piriformis Stretch Below 90 Degrees Supine   PTRx Ther Proc 3 - Details supine QL stretch instead today   PTRx Ther Proc 4 Bent Knee Fall Out   PTRx Ther Proc 4 - Details NT   PTRx Ther Proc 5 Prone On A Pillow   PTRx Ther Proc 5 - Details NT: instead of prone press-up, also trialed this with femoral nerve glides x8   PTRx Ther Proc 6 birddog   PTRx Ther Proc 6 - Details NT   PTRx Ther Proc 7 Supine Abdominal Exercise #6 (Dead Bug)   PTRx Ther Proc 7 - Details NT - not on HEP   Skilled Intervention review HEP, trial of new stretches   Patient Response/Progress tolerating   Neuromuscular Re-education   Neuromuscular re-ed of mvmt, balance, coord, kinesthetic sense, posture, proprioception minutes (53990) 25   Neuro Re-ed 1 reviewed MET for L sacral ant rotation of SIJ   Neuro Re-ed 1 - Details Much time spent on cueing for: Golfers Lift with Support (several cones stacked, losing coordination when reaching 5\" or less away from the ground)   //      Body Mechanics - Full Squat -- fair, some pain, trialed with placing object on 1.5' platform      //      Body Mechanics - Half Kneel Lift (some pain with this, using quad muscles to " "stand back up)   PTRx Neuro Re-ed 1 Supine Abdominal Exercise #3B (Two Leg Marching)   PTRx Neuro Re-ed 1 - Details VR   PTRx Neuro Re-ed 2 Supine Sciatic Nerve Sliders   PTRx Neuro Re-ed 2 - Details NT   Skilled Intervention biomechanics   Patient Response/Progress tolerating   Manual Therapy   Manual Therapy 1 MET for L sacral ant rotation utilizing hip flexors and hip extensors x5s x5, ending with resisted hip abd/add x5s x3 -- involved limb's foot on writer's shoulder, gently pushing opposite leg down into the mat. pt in supine.   Manual Therapy 1 - Details Trialed long-axis distraction (gentle, grade I) pt not feeling much but does feel better on left side   Manual Therapy 2 MFR, skin rolling lumbar paraspinals, QL R>L   Skilled Intervention Performed interventions to improve mobility and reduce pain in the involved body regions to facilitate improved tolerance and performance with exercise and activities of daily living.   Patient Response/Progress tolerating   Self Care/home Management   Self Care 1 - Details NT - Discussed with patient the importance of trying to balance positions/activities that are known to provide relief (\"doing nothing\", \"laying flat on back for 20 minutes\"), and consistency with HEP which has been shown to reduce pain in the long term. advised patient to follow-up with PCP regarding medicaiton questions. Discussed role of PT addressing specifically how pain might be interfering with ADL's though patient states they are not feeling limited with ADLs due to pain   Skilled Intervention Education regarding mechanisms and rationale for physical therapy interventions selected to address their goals. Discussed self management strategies for ways patient can actively support progress towards goals. Education regarding activity precautions/restrictions. Addressed patients questions and concerns to their satisfaction prior to completion of visit.   Education   Learner/Method Patient   Education " Comments Education regarding mechanisms and rationale for physical therapy interventions selected to address their goals. Discussed self management strategies for ways patient can actively support progress towards goals. Education regarding activity precautions/restrictions. Addressed patients questions and concerns to their satisfaction prior to completion of visit.   Plan   Home program PTRX    phone + printouts   Plan for next session sched 1-2 more appts; review lifting mechanics; review SL stance (for glute activation) and possibly progress;    MFR R>L QLs and paraspinals     //   try to progress extension-based exercises   Comments   Comments Overall, Crys continues to make progress and is able to tolerate significantly more activity than in her first couple sessions. Today focused on lifting mechanics with cueing for limiting spinal/low back involvement, some discomfort with this and pt reporting difficulty with the coordination. Education on practicing this within a pain-free range and how this translates to functional activity. Pt remains appropriate for skilled PT intervention.   Total Session Time   Timed Code Treatment Minutes 37   Total Treatment Time (sum of timed and untimed services) 37

## 2025-05-19 ENCOUNTER — TELEPHONE (OUTPATIENT)
Dept: ENDOCRINOLOGY | Facility: CLINIC | Age: 65
End: 2025-05-19

## 2025-05-19 ENCOUNTER — THERAPY VISIT (OUTPATIENT)
Dept: PHYSICAL THERAPY | Facility: REHABILITATION | Age: 65
End: 2025-05-19
Payer: COMMERCIAL

## 2025-05-19 DIAGNOSIS — M54.50 ACUTE BILATERAL LOW BACK PAIN WITHOUT SCIATICA: Primary | ICD-10-CM

## 2025-05-19 PROCEDURE — 97140 MANUAL THERAPY 1/> REGIONS: CPT | Mod: GP

## 2025-05-19 PROCEDURE — 97110 THERAPEUTIC EXERCISES: CPT | Mod: GP

## 2025-05-19 NOTE — TELEPHONE ENCOUNTER
[facility] Call Center    Phone Message    May a detailed message be left on voicemail: yes     Reason for Call: Other: patient has an in person visit with Jaskaran on 05/21/2025 and wondering if it can be virtual due to traffic at that time, she states it'll take her an hour and a half to get there. Please call back and advise.

## 2025-05-21 ENCOUNTER — VIRTUAL VISIT (OUTPATIENT)
Dept: ENDOCRINOLOGY | Facility: CLINIC | Age: 65
End: 2025-05-21
Payer: COMMERCIAL

## 2025-05-21 DIAGNOSIS — M81.0 AGE-RELATED OSTEOPOROSIS WITHOUT CURRENT PATHOLOGICAL FRACTURE: Primary | ICD-10-CM

## 2025-05-21 RX ORDER — RISEDRONATE SODIUM 35 MG/1
35 TABLET, FILM COATED ORAL
Qty: 12 TABLET | Refills: 3 | Status: SHIPPED | OUTPATIENT
Start: 2025-05-21

## 2025-05-21 NOTE — PROGRESS NOTES
Virtual Visit Details    Type of service:  Video Visit     Originating Location (pt. Location): parked car  Distant Location (provider location):  Off-site  Platform used for Video Visit: Ottoniel      Recent issues:  Osteoporosis follow-up evaluation  Previous history using alendronate then discontinued (5/2021), started raloxifene 8/2021 but bloating and intermittent discontinuation (8/2021),   then restarted raloxifene 4/2024, but changed to risedonate (Actonel) 7/2024 2/2025 Fall injury and gluteal or low back pains  Subsequent XRays unremarkable, but back pain and radiating right leg pain, then a 3/2025 MRI showed 3 slipped discs, then PT  Now mild low back pain but no radiating pains now... she's considering steroid injection        1982. History of ITP, then Prednisone medication for ~2 yrs duration  Menopause early 50's, no subsequent HRT  Previous medical evaluations with Dr. Bettina Salgado/[facility]  1/23/17 DEXA:   L1-4    T-score:  -0.4    Left fem neck   T-score: -2.0   Right fem neck  T-score: 12.2  12/2020. GYN evaluation with Dr. Bebo Mariscal/[facility]   1/28/21 DEXA:               Lumbar Spine (L1-L4)       T-score:  -0.4, degenerative changes present               Left Femoral Neck             T-score:  -2.5               Right Femoral Neck           T-score:  -2.5    Health history includes:  Bone fractures:   none  Vit D deficiency:   yes   Kidney stones:   none  Osteoporosis med:   none previously  Supplements:    MVI 1-tab daily       Calcium 600 mg 1-tab daily       Vit C 500 mg 1-tab daily  Previous [facility] labs include:   Lab Test 12/15/20  1534   VITDT 49         4/2/21. Initial osteoporosis evaluation with me at [place]  Reviewed health history and osteoporosis management  Advised starting alendronate medication with weekly dosing  She recalls having some abdominal bloating while on the medication    5/31/21. Hospitalization with cold intolerance, then febrile illness  Subsequently had  COVID-19 test which was negative  Went to [facility] ED and admitted  GI symptoms, elevated liver tests  Decision made to stop alendronate medication, last dose 5/28/21  Repeat lab testing after hospitalization, recalls improvement with liver tests  Developed right hand (index & thumb) coldness and numbness, subsequently dx radial artery clot  8/2021. Med change to raloxifene (Evista) 60 mg daily at nighttime  Tolerating medication well without apparent SE's  3/31/22 DEXA:   L1-4    T-score: -0.1   Left fem neck:   T-score: -2.4   Right fem neck:  T-score: -2.4  4/6/23 DEXA:   L1-4     T-score:  0.0, marked degenerative changes present    Left fem neck:   T-score:  -2.4    Right fem neck:  T-score:  -2.5  7/11/24 DEXA:  L1-4:    T-score: 0.1. Degenerative change may artifactually increase BMD.  Right fem neck:  T-score: -2.4.   Left fem neck:   T-score: -2.5.   Recent [facility] labs include:  Lab Results   Component Value Date     04/18/2025    POTASSIUM 4.4 04/18/2025    CHLORIDE 106 04/18/2025    CO2 25 04/18/2025    ANIONGAP 12 04/18/2025    GLC 95 04/18/2025    BUN 15.0 04/18/2025    CR 0.78 04/18/2025    GFRESTIMATED 84 04/18/2025    GFRESTBLACK >60 06/10/2021    ROBERT 9.7 04/18/2025    TSH 3.07 02/28/2023    VITDT 48 04/18/2025    PTHI 79 11/15/2021     Supplements:  calcium citrate 1 tab BID (sometimes misses 2nd tablet)  Current dose:  risedronate (Actonel) 35 mg tab weekly      Lives in [place], [place]  Sees Dr. Nova Sands/[facility] for gen med evaluations  Previously [facility] Dr[facility]ebo [facility]cal/[facility]   [facility]    PMH/PSH:  Past Medical History:   Diagnosis Date    Back pain     History of ITP     s/p splenectomy in 1984    Mild intermittent asthma     Osteoporosis     Sensorineural hearing loss, bilateral     wears hearing aids     Past Surgical History:   Procedure Laterality Date    BREAST BIOPSY, RT/LT Left      benign    CONIZATION  01/01/1991    JENNIFER III    LASER TX, CERVICAL  12/23/1991    Laser TX Cervical JENNIFER 3 neg margins, no invasion    SPLENECTOMY, TOTAL  01/01/1984    ITP       Family Hx:  Family History   Problem Relation Age of Onset    Lymphoma Mother         with ? spread to breast    Brain Tumor Father         with ? mets to lung    Diabetes Type 1 Sister     Hypothyroidism Sister     Colon Cancer Maternal Aunt     Diabetes Type 2  No family hx of     Myocardial Infarction No family hx of     Cerebrovascular Disease No family hx of     Coronary Artery Disease Early Onset No family hx of     Ovarian Cancer No family hx of          Social Hx:  Social History     Socioeconomic History    Marital status:      Spouse name: Not on file    Number of children: 2    Years of education: Not on file    Highest education level: Not on file   Occupational History    Occupation: Pharmacy Technician   Tobacco Use    Smoking status: Never    Smokeless tobacco: Never   Vaping Use    Vaping status: Never Used   Substance and Sexual Activity    Alcohol use: Yes     Comment: rare    Drug use: No    Sexual activity: Not Currently   Other Topics Concern    Parent/sibling w/ CABG, MI or angioplasty before 65F 55M? Not Asked   Social History Narrative    .    2 children.    2 grandchildren.    Active job; no formal exercise.      Social Drivers of Health     Financial Resource Strain: Low Risk  (4/8/2025)    Financial Resource Strain     Within the past 12 months, have you or your family members you live with been unable to get utilities (heat, electricity) when it was really needed?: No   Food Insecurity: Low Risk  (4/8/2025)    Food Insecurity     Within the past 12 months, did you worry that your food would run out before you got money to buy more?: No     Within the past 12 months, did the food you bought just not last and you didn t have money to get more?: No   Transportation Needs: Low Risk  (4/8/2025)     Transportation Needs     Within the past 12 months, has lack of transportation kept you from medical appointments, getting your medicines, non-medical meetings or appointments, work, or from getting things that you need?: No   Physical Activity: Insufficiently Active (4/8/2025)    Exercise Vital Sign     Days of Exercise per Week: 1 day     Minutes of Exercise per Session: 20 min   Stress: No Stress Concern Present (4/8/2025)    [employer] [place] [employer] - Occupational Stress Questionnaire     Feeling of Stress : Only a little   Social Connections: Unknown (4/8/2025)    Social Connection and Isolation Panel [NHANES]     Frequency of Communication with Friends and Family: Not on file     Frequency of Social Gatherings with Friends and Family: Once a week     Attends Yazidi Services: Not on file     Active Member of Clubs or Organizations: Not on file     Attends Club or Organization Meetings: Not on file     Marital Status: Not on file   Interpersonal Safety: Low Risk  (4/9/2025)    Interpersonal Safety     Do you feel physically and emotionally safe where you currently live?: Yes     Within the past 12 months, have you been hit, slapped, kicked or otherwise physically hurt by someone?: No     Within the past 12 months, have you been humiliated or emotionally abused in other ways by your partner or ex-partner?: No   Housing Stability: Low Risk  (4/8/2025)    Housing Stability     Do you have housing? : Yes     Are you worried about losing your housing?: No          MEDICATIONS:  has a current medication list which includes the following prescription(s): albuterol, calcium citrate-vitamin d, fluticasone, multiple vitamins-minerals, and risedronate.    ROS:     ROS: 10 point ROS neg other than the symptoms noted above in the HPI.    GENERAL: mild fatigue, wt stable; denies fevers, chills, night sweats.    HEENT: no dysphagia, odonophagia, diplopia, neck pain  THYROID:  no apparent hyper or hypothyroid  symptoms  CV: no chest pain, pressure, palpitations  LUNGS: no SOB, HOLLDIAY, cough, wheezing   ABDOMEN: no recent indigestion; denies reflux, constipation, diarrhea, abdominal pain  EXTREMITIES: no rashes, ulcers, edema  NEUROLOGY: some numbness at right hand index and thumb; no headaches, denies changes in vision, tingling, extremitiy numbness   MSK:  lower back pains; denies muscle weakness  SKIN: no rashes or lesions  : no menses since early 50's, denies hot flashes  PSYCH:  stable mood, no significant anxiety or depression  ENDOCRINE: no heat or cold intolerance    Physical Exam (visual exam)  VS:  no vital signs taken for video visit  CONSTITUTIONAL: healthy, alert and NAD, well dressed, answering questions appropriately  ENT: no nose swelling or nasal discharge, mouth redness or gum changes.  EYES: eyes grossly normal to inspection, conjunctivae and sclerae normal, no exophthalmos or proptosis  THYROID:  no apparent nodules or goiter  LUNGS: no audible wheeze, cough or visible cyanosis, no visible retractions or increased work of breathing  ABDOMEN: abdomen not evaluated  EXTREMITIES: no hand tremors, limited exam  NEUROLOGY: CN grossly intact, mentation intact and speech normal   SKIN:  no apparent skin lesions, rash, or edema with visualized skin appearance  PSYCH: mentation appears normal, affect normal/bright, judgement and insight intact,   normal speech and appearance well groomed      LABS:    All pertinent notes, labs, and images personally reviewed by me.     A/P:  Encounter Diagnosis   Name Primary?    Age-related osteoporosis without current pathological fracture Yes       Comments:  Reviewed health history and osteoporosis issues.  Risk factors include low estrogen of menopause, previous (short duration) steroid use for ITP, vit D deficiency  Patient tolerating risedronate weekly medication well  Reviewed and interpreted tests   Ordered appropriate tests for the endocrinology disease management.     Management options discussed and implemented after shared medical decision making with the patient.  Osteoporosis problem is chronic-stable    Plan:  Discussed general issues with the osteoporosis diagnosis and management  Reviewed concept of using the DEXA scan imaging to assess bone mineral density (BMD)  We have discussed terminology of the T-score   Reviewed the osteoporosis medication treatment options    Recommend:  Continue the current risedronate 35 mg tablet weekly dose plan  Continue the calcium citrate-D supplement as 1-tab BID  Monitor for symptom changes  Avoid heavy lifting and fall injuries  Repeat DEXA scan in late 7/2025 for comparison   Scan at [facility] radiology dept   Procedure order placed  If significant worsening of BMD, consider med change for treatment  Will summarize results with [employer]anch[employer] message when available    Cause of the fatigue unclear  Review the back steroid injection option with PCP  Addressed patient questions today    The longitudinal plan of care for the endocrine problem(s) were addressed during this visit.  Due to added complexity of care,   we will continue to support the patient and the subsequent management of this condition with ongoing continuity of care.    There are no Patient Instructions on file for this visit.    Future labs ordered today:   Orders Placed This Encounter   Procedures    DX Bone Density     Radiology/Consults ordered today: DX BONE DENSITY    Total time spent on day of encounter:  25 min    Follow-up:  5/2026, Return    BLANCA Jessica MD, MS  Endocrinology  [facility]    CC:  ROBIN Sands

## 2025-05-21 NOTE — LETTER
5/21/2025      Crys Quarles  [address] [facility] [address]      Dear Colleague,    Thank you for referring your patient, Crys Quarles, to the [facility] [place]. Please see a copy of my visit note below.    Virtual Visit Details    Type of service:  Video Visit     Originating Location (pt. Location): parked car  Distant Location (provider location):  Off-site  Platform used for Video Visit: [employer]      Recent issues:  Osteoporosis follow-up evaluation  Previous history using alendronate then discontinued (5/2021), started raloxifene 8/2021 but bloating and intermittent discontinuation (8/2021),   then restarted raloxifene 4/2024, but changed to risedonate (Actonel) 7/2024 2/2025 Fall injury and gluteal or low back pains  Subsequent XRays unremarkable, but back pain and radiating right leg pain, then a 3/2025 MRI showed 3 slipped discs, then PT  Now mild low back pain but no radiating pains now... she's considering steroid injection        1982. History of ITP, then Prednisone medication for ~2 yrs duration  Menopause early 50's, no subsequent HRT  Previous medical evaluations with Dr. Bettina Salgado/[facility]  1/23/17 DEXA:   L1-4    T-score:  -0.4    Left fem neck   T-score: -2.0   Right fem neck  T-score: 12.2  12/2020. GYN evaluation with Dr. Bebo Mariscal/[facility]   1/28/21 DEXA:               Lumbar Spine (L1-L4)       T-score:  -0.4, degenerative changes present               Left Femoral Neck             T-score:  -2.5               Right Femoral Neck           T-score:  -2.5    Health history includes:  Bone fractures:   none  Vit D deficiency:   yes   Kidney stones:   none  Osteoporosis med:   none previously  Supplements:    MVI 1-tab daily       Calcium 600 mg 1-tab daily       Vit C 500 mg 1-tab daily  Previous [facility] labs include:   Lab Test 12/15/20  1534   VITDT 49         4/2/21. Initial osteoporosis evaluation with me at [place]  Reviewed  health history and osteoporosis management  Advised starting alendronate medication with weekly dosing  She recalls having some abdominal bloating while on the medication    5/31/21. Hospitalization with cold intolerance, then febrile illness  Subsequently had COVID-19 test which was negative  Went to [facility] ED and admitted  GI symptoms, elevated liver tests  Decision made to stop alendronate medication, last dose 5/28/21  Repeat lab testing after hospitalization, recalls improvement with liver tests  Developed right hand (index & thumb) coldness and numbness, subsequently dx radial artery clot  8/2021. Med change to raloxifene (Evista) 60 mg daily at nighttime  Tolerating medication well without apparent SE's  3/31/22 DEXA:   L1-4    T-score: -0.1   Left fem neck:   T-score: -2.4   Right fem neck:  T-score: -2.4  4/6/23 DEXA:   L1-4     T-score:  0.0, marked degenerative changes present    Left fem neck:   T-score:  -2.4    Right fem neck:  T-score:  -2.5  7/11/24 DEXA:  L1-4:    T-score: 0.1. Degenerative change may artifactually increase BMD.  Right fem neck:  T-score: -2.4.   Left fem neck:   T-score: -2.5.   Recent [facility] labs include:  Lab Results   Component Value Date     04/18/2025    POTASSIUM 4.4 04/18/2025    CHLORIDE 106 04/18/2025    CO2 25 04/18/2025    ANIONGAP 12 04/18/2025    GLC 95 04/18/2025    BUN 15.0 04/18/2025    CR 0.78 04/18/2025    GFRESTIMATED 84 04/18/2025    GFRESTBLACK >60 06/10/2021    ROBERT 9.7 04/18/2025    TSH 3.07 02/28/2023    VITDT 48 04/18/2025    PTHI 79 11/15/2021     Supplements:  calcium citrate 1 tab BID (sometimes misses 2nd tablet)  Current dose:  risedronate (Actonel) 35 mg tab weekly      Lives in [place], [place]  Sees Dr. Nova Sands/[facility] for gen med evaluations  Previously [facility] Dr[facility] Bebo [facility]cal/[facility]   Also sees Dr. Nathaniel Ceja/[facility]    PMH/PSH:  Past Medical History:   Diagnosis  Date     Back pain      History of ITP     s/p splenectomy in 1984     Mild intermittent asthma      Osteoporosis      Sensorineural hearing loss, bilateral     wears hearing aids     Past Surgical History:   Procedure Laterality Date     BREAST BIOPSY, RT/LT Left     benign     CONIZATION  01/01/1991    JENNIFER III     LASER TX, CERVICAL  12/23/1991    Laser TX Cervical JENNIFER 3 neg margins, no invasion     SPLENECTOMY, TOTAL  01/01/1984    ITP       Family Hx:  Family History   Problem Relation Age of Onset     Lymphoma Mother         with ? spread to breast     Brain Tumor Father         with ? mets to lung     Diabetes Type 1 Sister      Hypothyroidism Sister      Colon Cancer Maternal Aunt      Diabetes Type 2  No family hx of      Myocardial Infarction No family hx of      Cerebrovascular Disease No family hx of      Coronary Artery Disease Early Onset No family hx of      Ovarian Cancer No family hx of          Social Hx:  Social History     Socioeconomic History     Marital status:      Spouse name: Not on file     Number of children: 2     Years of education: Not on file     Highest education level: Not on file   Occupational History     Occupation: Pharmacy Technician   Tobacco Use     Smoking status: Never     Smokeless tobacco: Never   Vaping Use     Vaping status: Never Used   Substance and Sexual Activity     Alcohol use: Yes     Comment: rare     Drug use: No     Sexual activity: Not Currently   Other Topics Concern     Parent/sibling w/ CABG, MI or angioplasty before 65F 55M? Not Asked   Social History Narrative    .    2 children.    2 grandchildren.    Active job; no formal exercise.      Social Drivers of Health     Financial Resource Strain: Low Risk  (4/8/2025)    Financial Resource Strain      Within the past 12 months, have you or your family members you live with been unable to get utilities (heat, electricity) when it was really needed?: No   Food Insecurity: Low Risk  (4/8/2025)     Food Insecurity      Within the past 12 months, did you worry that your food would run out before you got money to buy more?: No      Within the past 12 months, did the food you bought just not last and you didn t have money to get more?: No   Transportation Needs: Low Risk  (4/8/2025)    Transportation Needs      Within the past 12 months, has lack of transportation kept you from medical appointments, getting your medicines, non-medical meetings or appointments, work, or from getting things that you need?: No   Physical Activity: Insufficiently Active (4/8/2025)    Exercise Vital Sign      Days of Exercise per Week: 1 day      Minutes of Exercise per Session: 20 min   Stress: No Stress Concern Present (4/8/2025)    [employer] [place] [employer] - Occupational Stress Questionnaire      Feeling of Stress : Only a little   Social Connections: Unknown (4/8/2025)    Social Connection and Isolation Panel [NHANES]      Frequency of Communication with Friends and Family: Not on file      Frequency of Social Gatherings with Friends and Family: Once a week      Attends Synagogue Services: Not on file      Active Member of Clubs or Organizations: Not on file      Attends Club or Organization Meetings: Not on file      Marital Status: Not on file   Interpersonal Safety: Low Risk  (4/9/2025)    Interpersonal Safety      Do you feel physically and emotionally safe where you currently live?: Yes      Within the past 12 months, have you been hit, slapped, kicked or otherwise physically hurt by someone?: No      Within the past 12 months, have you been humiliated or emotionally abused in other ways by your partner or ex-partner?: No   Housing Stability: Low Risk  (4/8/2025)    Housing Stability      Do you have housing? : Yes      Are you worried about losing your housing?: No          MEDICATIONS:  has a current medication list which includes the following prescription(s): albuterol, calcium citrate-vitamin d,  fluticasone, multiple vitamins-minerals, and risedronate.    ROS:     ROS: 10 point ROS neg other than the symptoms noted above in the HPI.    GENERAL: mild fatigue, wt stable; denies fevers, chills, night sweats.    HEENT: no dysphagia, odonophagia, diplopia, neck pain  THYROID:  no apparent hyper or hypothyroid symptoms  CV: no chest pain, pressure, palpitations  LUNGS: no SOB, HOLLIDAY, cough, wheezing   ABDOMEN: no recent indigestion; denies reflux, constipation, diarrhea, abdominal pain  EXTREMITIES: no rashes, ulcers, edema  NEUROLOGY: some numbness at right hand index and thumb; no headaches, denies changes in vision, tingling, extremitiy numbness   MSK:  lower back pains; denies muscle weakness  SKIN: no rashes or lesions  : no menses since early 50's, denies hot flashes  PSYCH:  stable mood, no significant anxiety or depression  ENDOCRINE: no heat or cold intolerance    Physical Exam (visual exam)  VS:  no vital signs taken for video visit  CONSTITUTIONAL: healthy, alert and NAD, well dressed, answering questions appropriately  ENT: no nose swelling or nasal discharge, mouth redness or gum changes.  EYES: eyes grossly normal to inspection, conjunctivae and sclerae normal, no exophthalmos or proptosis  THYROID:  no apparent nodules or goiter  LUNGS: no audible wheeze, cough or visible cyanosis, no visible retractions or increased work of breathing  ABDOMEN: abdomen not evaluated  EXTREMITIES: no hand tremors, limited exam  NEUROLOGY: CN grossly intact, mentation intact and speech normal   SKIN:  no apparent skin lesions, rash, or edema with visualized skin appearance  PSYCH: mentation appears normal, affect normal/bright, judgement and insight intact,   normal speech and appearance well groomed      LABS:    All pertinent notes, labs, and images personally reviewed by me.     A/P:  Encounter Diagnosis   Name Primary?     Age-related osteoporosis without current pathological fracture Yes        Comments:  Reviewed health history and osteoporosis issues.  Risk factors include low estrogen of menopause, previous (short duration) steroid use for ITP, vit D deficiency  Patient tolerating risedronate weekly medication well  Reviewed and interpreted tests   Ordered appropriate tests for the endocrinology disease management.    Management options discussed and implemented after shared medical decision making with the patient.  Osteoporosis problem is chronic-stable    Plan:  Discussed general issues with the osteoporosis diagnosis and management  Reviewed concept of using the DEXA scan imaging to assess bone mineral density (BMD)  We have discussed terminology of the T-score   Reviewed the osteoporosis medication treatment options    Recommend:  Continue the current risedronate 35 mg tablet weekly dose plan  Continue the calcium citrate-D supplement as 1-tab BID  Monitor for symptom changes  Avoid heavy lifting and fall injuries  Repeat DEXA scan in late 7/2025 for comparison   Scan at [facility] radiology dept   Procedure order placed  If significant worsening of BMD, consider med change for treatment  Will summarize results with [employer] message when available    Cause of the fatigue unclear  Review the back steroid injection option with PCP  Addressed patient questions today    The longitudinal plan of care for the endocrine problem(s) were addressed during this visit.  Due to added complexity of care,   we will continue to support the patient and the subsequent management of this condition with ongoing continuity of care.    There are no Patient Instructions on file for this visit.    Future labs ordered today:   Orders Placed This Encounter   Procedures     DX Bone Density     Radiology/Consults ordered today: DX BONE DENSITY    Total time spent on day of encounter:  25 min    Follow-up:  5/2026, Return    BLANCA Jessica MD, MS  Endocrinology  [facility]    CC:  ROBIN Sands        Again, thank you for  allowing me to participate in the care of your patient.        Sincerely,        Bebo Jessica MD    Electronically signed

## 2025-05-29 ENCOUNTER — THERAPY VISIT (OUTPATIENT)
Dept: PHYSICAL THERAPY | Facility: REHABILITATION | Age: 65
End: 2025-05-29
Payer: COMMERCIAL

## 2025-05-29 DIAGNOSIS — M54.50 ACUTE BILATERAL LOW BACK PAIN WITHOUT SCIATICA: Primary | ICD-10-CM
